# Patient Record
Sex: FEMALE | Race: WHITE | Employment: FULL TIME | URBAN - METROPOLITAN AREA
[De-identification: names, ages, dates, MRNs, and addresses within clinical notes are randomized per-mention and may not be internally consistent; named-entity substitution may affect disease eponyms.]

---

## 2017-05-08 DIAGNOSIS — F41.9 ANXIETY: ICD-10-CM

## 2017-05-08 RX ORDER — ALPRAZOLAM 0.25 MG/1
TABLET ORAL
Qty: 30 TAB | Refills: 0 | Status: SHIPPED | OUTPATIENT
Start: 2017-05-08 | End: 2017-05-10 | Stop reason: SDUPTHER

## 2017-05-10 DIAGNOSIS — F41.9 ANXIETY: ICD-10-CM

## 2017-05-11 RX ORDER — ALPRAZOLAM 0.25 MG/1
TABLET ORAL
Qty: 30 TAB | Refills: 0 | Status: SHIPPED | OUTPATIENT
Start: 2017-05-11 | End: 2017-06-13 | Stop reason: SDUPTHER

## 2017-05-16 ENCOUNTER — OFFICE VISIT (OUTPATIENT)
Dept: INTERNAL MEDICINE CLINIC | Facility: CLINIC | Age: 42
End: 2017-05-16

## 2017-05-16 VITALS
HEART RATE: 98 BPM | TEMPERATURE: 98.4 F | DIASTOLIC BLOOD PRESSURE: 79 MMHG | SYSTOLIC BLOOD PRESSURE: 130 MMHG | RESPIRATION RATE: 18 BRPM | BODY MASS INDEX: 34.15 KG/M2 | WEIGHT: 200 LBS | HEIGHT: 64 IN

## 2017-05-16 DIAGNOSIS — G89.29 CHRONIC PAIN OF RIGHT ANKLE: Primary | ICD-10-CM

## 2017-05-16 DIAGNOSIS — M25.571 CHRONIC PAIN OF RIGHT ANKLE: Primary | ICD-10-CM

## 2017-05-16 NOTE — PROGRESS NOTES
Subjective: Judy Álvarez is a 43 y.o. female who presents today for ankle edema. She typically sees [facility] [place]. Ankle Swelling  Patient complains of mildedema to right ankle. Swelling is not associated with pain but there has been an injury to the ankle in the past. Onset of symptoms was 5 days ago, gradually improving since that time. She has a history of right knee and ankle pain after a fall on the stairs. Two years ago she started working out more and then noted pain to right hip, knee, and ankle. She believes she never properly healed from her fall and has since had intermittent ankle pain/swelling. She is currently doing yoga, she has been able to balance on it but notes a grinding sensation, also noted with walking. She was doing a specific balance move last week that put a lot of pressure and weight on her ankle. Cardiac risk factors include obesity, hyperlipidemia. She is wearing an air brace at night, she has been propping it up at night, she is not using medications. 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    Multinodular goiter 05/21/2015    Elevated liver enzymes 05/21/2015    Hypercholesteremia 05/21/2015    Allergic rhinitis due to allergen 05/15/2015    Anxiety 05/15/2015    Factor V deficiency ([facility] [address] 05/15/2015    Urticaria 05/15/2015     Current Outpatient Prescriptions   Medication Sig Dispense Refill    ALPRAZolam (XANAX) 0.25 mg tablet TAKE ONE TABLET BY MOUTH TWICE DAILY AS NEEDED FOR ANXIETY (MAX DAILY AMOUNT: 2 TABS) 30 Tab 0    QNASL 80 mcg/actuation HFAA       fexofenadine-pseudoephedrine (ALLEGRA-D)  mg Tb12 Take 1 Tab by mouth every twelve (12) hours.  busPIRone (BUSPAR) 10 mg tablet TAKE ONE TABLET BY MOUTH THREE TIMES DAILY AS NEEDED 60 Tab 5    traZODone (DESYREL) 100 mg tablet Take 1 Tab by mouth nightly.  90 Tab 1    cholecalciferol, VITAMIN D3, (VITAMIN D3) 5,000 unit tab tablet Take  by mouth daily.  multivitamin (ONE A DAY) tablet Take 1 Tab by mouth daily. No Known Allergies  Family History   Problem Relation Age of Onset    Diabetes Mother     Thyroid Disease Mother      hypothyroidism    Hypertension Father     High Cholesterol Father     Thyroid Disease Father      goiter, hyperthyroidism    Heart Attack Father      in 29's    Diabetes Maternal Grandmother     Diabetes Maternal Grandfather     Diabetes Paternal Grandmother     Diabetes Paternal Grandfather      Social History   Substance Use Topics    Smoking status: Former Smoker     Packs/day: 1.50     Years: 24.00     Types: Cigarettes     Quit date: 8/8/2015    Smokeless tobacco: Not on file      Comment: Has tried hypnotherapy, ecig, Wellbutrin. Chantix worked!  Alcohol use 0.0 oz/week     0 Standard drinks or equivalent per week      Comment: 1-2 glass with dinner 3x/week, 2 glasses Friday/Saturday        Review of Systems    A comprehensive review of systems was negative except for that written in the HPI. Objective:     Visit Vitals    /79 (BP 1 Location: Left arm, BP Patient Position: Sitting)    Pulse 98    Temp 98.4 °F (36.9 °C) (Oral)    Resp 18    Ht 5' 4\" (1.626 m)    Wt 200 lb (90.7 kg)    LMP 05/02/2017 (Approximate)    BMI 34.33 kg/m2     General appearance: alert, cooperative, no distress, appears stated age  Head: Normocephalic, without obvious abnormality, atraumatic  Eyes: negative  Lungs: clear to auscultation bilaterally  Heart: regular rate and rhythm, S1, S2 normal, no murmur, click, rub or gallop  Extremities: extremities normal, atraumatic, no cyanosis. Right ankle with 1+ edema, no pitting. No pain with palpation of joint or to plantar surface. Crepitus noted with dorsiflexion. Pulses: 2+ and symmetric  Neurologic: Alert and oriented X 3, normal strength and tone.  Normal coordination and gait  Nursing note and vitals reviewed  Assessment/Plan:       ICD-10-CM ICD-9-CM    1. Chronic pain of right ankle M25.571 719.47 REFERRAL TO SPORTS MEDICINE    G89.29 338.29      Ankle wrapped with ace bandage. Advised that she use supportive shoes and take antiinflammatories as needed. Advised her to call back or return to office if symptoms worsen/change/persist.  Discussed expected course/resolution/complications of diagnosis in detail with patient. Medication risks/benefits/costs/interactions/alternatives discussed with patient. She was given an after visit summary which includes diagnoses, current medications, & vitals. She expressed understanding with the diagnosis and plan.

## 2017-05-16 NOTE — PROGRESS NOTES
Chief Complaint   Patient presents with    Ankle swelling     been having some r ankle swelling, does have hx of right leg and knee problems not sure it is related. Feels a grinding sensation but not painful. 1. Have you been to the ER, urgent care clinic since your last visit? Hospitalized since your last visit? No    2. Have you seen or consulted any other health care providers outside of the [address] [place] [address] since your last visit? Include any pap smears or colon screening.  No

## 2017-05-16 NOTE — MR AVS SNAPSHOT
Visit Information Date & Time Provider Department Dept. Phone Encounter #  
 5/16/2017  3:30 PM Beth Epps, Prakash [address] Internal Medicine [phone] [id number] Follow-up Instructions Return if symptoms worsen or fail to improve. Upcoming Health Maintenance Date Due INFLUENZA AGE 9 TO ADULT 8/1/2017 PAP AKA CERVICAL CYTOLOGY 6/15/2018 DTaP/Tdap/Td series (2 - Td) 5/23/2023 Allergies as of 5/16/2017  Review Complete On: 5/16/2017 By: Beth Epps NP No Known Allergies Current Immunizations  Reviewed on 11/18/2016 Name Date Influenza Vaccine (Quad) PF 11/18/2016, 12/2/2015 Tdap 5/23/2013 Not reviewed this visit You Were Diagnosed With   
  
 Codes Comments Chronic pain of right ankle    -  Primary ICD-10-CM: M25.571, G89.29 ICD-9-CM: 719.47, 338.29 Vitals BP Pulse Temp Resp Height(growth percentile) Weight(growth percentile) 130/79 (BP 1 Location: Left arm, BP Patient Position: Sitting) 98 98.4 °F (36.9 °C) (Oral) 18 5' 4\" (1.626 m) 200 lb (90.7 kg) LMP BMI OB Status Smoking Status 05/02/2017 (Approximate) 34.33 kg/m2 Having regular periods Former Smoker Vitals History BMI and BSA Data Body Mass Index Body Surface Area  
 34.33 kg/m 2 2.02 m 2 Preferred Pharmacy Pharmacy Name Phone [facility] [address] [place] [address] 78 Your Updated Medication List  
  
   
This list is accurate as of: 5/16/17  3:50 PM.  Always use your most recent med list.  
  
  
  
  
 ALPRAZolam 0.25 mg tablet Commonly known as:  XANAX  
TAKE ONE TABLET BY MOUTH TWICE DAILY AS NEEDED FOR ANXIETY (MAX DAILY AMOUNT: 2 TABS)  
  
 busPIRone 10 mg tablet Commonly known as:  BUSPAR  
TAKE ONE TABLET BY MOUTH THREE TIMES DAILY AS NEEDED  
  
 cholecalciferol (VITAMIN D3) 5,000 unit Tab tablet Commonly known as:  VITAMIN D3 Take  by mouth daily. fexofenadine-pseudoephedrine  mg Tb12 Commonly known as:  ALLEGRA-D Take 1 Tab by mouth every twelve (12) hours. multivitamin tablet Commonly known as:  ONE A DAY Take 1 Tab by mouth daily. QNASL 80 mcg/actuation Hfaa Generic drug:  beclomethasone dipropionate  
  
 traZODone 100 mg tablet Commonly known as:  Shelva Chaffee Take 1 Tab by mouth nightly. We Performed the Following REFERRAL TO SPORTS MEDICINE [GWH380 Custom] Follow-up Instructions Return if symptoms worsen or fail to improve. Referral Information Referral ID Referred By Referred To  
  
 [id number] SKIFF, Kennth Rounds, MD   
   [address] [address], [facility] Phone: [phone] Fax: [phone] Visits Status Start Date End Date 1 New Request 5/16/17 5/16/18 If your referral has a status of pending review or denied, additional information will be sent to support the outcome of this decision. Patient Instructions Ankle Sprain: Rehab Exercises Your Care Instructions Here are some examples of typical rehabilitation exercises for your condition. Start each exercise slowly. Ease off the exercise if you start to have pain. Your doctor or physical therapist will tell you when you can start these exercises and which ones will work best for you. How to do the exercises \"Alphabet\" exercise 1. Trace the alphabet with your toe. This helps your ankle move in all directions. Side-to-side knee swing exercise 1. Sit in a chair with your foot flat on the floor. 2. Slowly move your knee from side to side. Keep your foot pressed flat. 3. Continue this exercise for 2 to 3 minutes. Towel curl 1. While sitting, place your foot on a towel on the floor. Scrunch the towel toward you with your toes. 2. Then use your toes to push the towel away from you.  
3. To make this exercise more challenging you can put something on the other end of the towel. A can of soup is about the right weight for this. Towel stretch 1. Sit with your legs extended and knees straight. 2. Place a towel around your foot just under the toes. 3. Hold each end of the towel in each hand, with your hands above your knees. 4. Pull back with the towel so that your foot stretches toward you. 5. Hold the position for at least 15 to 30 seconds. 6. Repeat 2 to 4 times a session. Do up to 5 sessions a day. Ankle eversion exercise 1. Start by sitting with your foot flat on the floor. Push your foot outward against a wall or a piece of furniture that doesn't move. Hold for about 6 seconds, and relax. Repeat 8 to 12 times. 2. After you feel comfortable with this, try using rubber tubing looped around the outside of your feet for resistance. Push your foot out to the side against the tubing, and then count to 10 as you slowly bring your foot back to the middle. Repeat 8 to 12 times. Isometric opposition exercises 1. While sitting, put your feet together flat on the floor. 2. Press your injured foot inward against your other foot. Hold for about 6 seconds, and relax. Repeat 8 to 12 times. 3. Then place the heel of your other foot on top of the injured one. Push down with the top heel while trying to push up with your injured foot. Hold for about 6 seconds, and relax. Repeat 8 to 12 times. Resisted ankle inversion 1. Sit on the floor with your good leg crossed over your other leg. 2. Hold both ends of an exercise band and loop the band around the inside of your affected foot. Then press your other foot against the band. 3. Keeping your legs crossed, slowly push your affected foot against the band so that foot moves away from your other foot. Then slowly relax. 4. Repeat 8 to 12 times. Resisted ankle eversion 1. Sit on the floor with your legs straight.  
2. Hold both ends of an exercise band and loop the band around the outside of your affected foot. Then press your other foot against the band. 3. Keeping your leg straight, slowly push your affected foot outward against the band and away from your other foot without letting your leg rotate. Then slowly relax. 4. Repeat 8 to 12 times. Resisted ankle dorsiflexion 1. Tie the ends of an exercise band together to form a loop. Attach one end of the loop to a secure object or shut a door on it to hold it in place. (Or you can have someone hold one end of the loop to provide resistance.) 2. While sitting on the floor or in a chair, loop the other end of the band over the top of your affected foot. 3. Keeping your knee and leg straight, slowly flex your foot to pull back on the exercise band, and then slowly relax. 4. Repeat 8 to 12 times. Single-leg balance 1. Stand on a flat surface with your arms stretched out to your sides like you are making the letter \"T. \" Then lift your good leg off the floor, bending it at the knee. If you are not steady on your feet, use one hand to hold on to a chair, counter, or wall. 2. Standing on the leg with your affected ankle, keep that knee straight. Try to balance on that leg for up to 30 seconds. Then rest for up to 10 seconds. 3. Repeat 6 to 8 times. 4. When you can balance on your affected leg for 30 seconds with your eyes open, try to balance on it with your eyes closed. When you can do this exercise with your eyes closed for 30 seconds and with ease and no pain, try standing on a pillow or piece of foam, and repeat steps 1 through 4. Follow-up care is a key part of your treatment and safety. Be sure to make and go to all appointments, and call your doctor if you are having problems. It's also a good idea to know your test results and keep a list of the medicines you take. Where can you learn more? Go to http://awilda-nicho.info/. Kathy Leos in the search box to learn more about \"Ankle Sprain: Rehab Exercises. \" 
 Current as of: May 23, 2016 Content Version: 11.2 © 0402-9247 [employer]. Care instructions adapted under license by [employer] (which disclaims liability or warranty for this information). If you have questions about a medical condition or this instruction, always ask your healthcare professional. [address] any warranty or liability for your use of this information. Introducing [facility] & HEALTH SERVICES! Dear Dona Hairston: 
Thank you for requesting a [employer] account. Our records indicate that you already have an active [employer] account. You can access your account anytime at [email or web address] Did you know that you can access your hospital and ER discharge instructions at any time in [employer]? You can also review all of your test results from your hospital stay or ER visit. Additional Information If you have questions, please visit the Frequently Asked Questions section of the [employer] website at [email or web address] Remember, [employer] is NOT to be used for urgent needs. For medical emergencies, dial 911. Now available from your iPhone and Android! Please provide this summary of care documentation to your next provider. Your primary care clinician is listed as Anastasia Aldridge. If you have any questions after today's visit, please call [phone].

## 2017-05-16 NOTE — PATIENT INSTRUCTIONS
Ankle Sprain: Rehab Exercises  Your Care Instructions  Here are some examples of typical rehabilitation exercises for your condition. Start each exercise slowly. Ease off the exercise if you start to have pain. Your doctor or physical therapist will tell you when you can start these exercises and which ones will work best for you. How to do the exercises  \"Alphabet\" exercise    1. Trace the alphabet with your toe. This helps your ankle move in all directions. Side-to-side knee swing exercise    1. Sit in a chair with your foot flat on the floor. 2. Slowly move your knee from side to side. Keep your foot pressed flat. 3. Continue this exercise for 2 to 3 minutes. Towel curl    1. While sitting, place your foot on a towel on the floor. Scrunch the towel toward you with your toes. 2. Then use your toes to push the towel away from you. 3. To make this exercise more challenging you can put something on the other end of the towel. A can of soup is about the right weight for this. Towel stretch    1. Sit with your legs extended and knees straight. 2. Place a towel around your foot just under the toes. 3. Hold each end of the towel in each hand, with your hands above your knees. 4. Pull back with the towel so that your foot stretches toward you. 5. Hold the position for at least 15 to 30 seconds. 6. Repeat 2 to 4 times a session. Do up to 5 sessions a day. Ankle eversion exercise    1. Start by sitting with your foot flat on the floor. Push your foot outward against a wall or a piece of furniture that doesn't move. Hold for about 6 seconds, and relax. Repeat 8 to 12 times. 2. After you feel comfortable with this, try using rubber tubing looped around the outside of your feet for resistance. Push your foot out to the side against the tubing, and then count to 10 as you slowly bring your foot back to the middle. Repeat 8 to 12 times. Isometric opposition exercises    1.  While sitting, put your feet together flat on the floor. 2. Press your injured foot inward against your other foot. Hold for about 6 seconds, and relax. Repeat 8 to 12 times. 3. Then place the heel of your other foot on top of the injured one. Push down with the top heel while trying to push up with your injured foot. Hold for about 6 seconds, and relax. Repeat 8 to 12 times. Resisted ankle inversion    1. Sit on the floor with your good leg crossed over your other leg. 2. Hold both ends of an exercise band and loop the band around the inside of your affected foot. Then press your other foot against the band. 3. Keeping your legs crossed, slowly push your affected foot against the band so that foot moves away from your other foot. Then slowly relax. 4. Repeat 8 to 12 times. Resisted ankle eversion    1. Sit on the floor with your legs straight. 2. Hold both ends of an exercise band and loop the band around the outside of your affected foot. Then press your other foot against the band. 3. Keeping your leg straight, slowly push your affected foot outward against the band and away from your other foot without letting your leg rotate. Then slowly relax. 4. Repeat 8 to 12 times. Resisted ankle dorsiflexion    1. Tie the ends of an exercise band together to form a loop. Attach one end of the loop to a secure object or shut a door on it to hold it in place. (Or you can have someone hold one end of the loop to provide resistance.)  2. While sitting on the floor or in a chair, loop the other end of the band over the top of your affected foot. 3. Keeping your knee and leg straight, slowly flex your foot to pull back on the exercise band, and then slowly relax. 4. Repeat 8 to 12 times. Single-leg balance    1. Stand on a flat surface with your arms stretched out to your sides like you are making the letter \"T. \" Then lift your good leg off the floor, bending it at the knee.  If you are not steady on your feet, use one hand to hold on to a chair, counter, or wall. 2. Standing on the leg with your affected ankle, keep that knee straight. Try to balance on that leg for up to 30 seconds. Then rest for up to 10 seconds. 3. Repeat 6 to 8 times. 4. When you can balance on your affected leg for 30 seconds with your eyes open, try to balance on it with your eyes closed. When you can do this exercise with your eyes closed for 30 seconds and with ease and no pain, try standing on a pillow or piece of foam, and repeat steps 1 through 4. Follow-up care is a key part of your treatment and safety. Be sure to make and go to all appointments, and call your doctor if you are having problems. It's also a good idea to know your test results and keep a list of the medicines you take. Where can you learn more? Go to http://awilda-nicho.info/. Bettina Jolly in the search box to learn more about \"Ankle Sprain: Rehab Exercises. \"  Current as of: May 23, 2016  Content Version: 11.2  © 9045-2318 [employer]. Care instructions adapted under license by [employer] (which disclaims liability or warranty for this information). If you have questions about a medical condition or this instruction, always ask your healthcare professional. [address] any warranty or liability for your use of this information.

## 2017-06-07 ENCOUNTER — OFFICE VISIT (OUTPATIENT)
Dept: INTERNAL MEDICINE CLINIC | Age: 42
End: 2017-06-07

## 2017-06-07 VITALS
SYSTOLIC BLOOD PRESSURE: 131 MMHG | WEIGHT: 198 LBS | TEMPERATURE: 98.2 F | DIASTOLIC BLOOD PRESSURE: 82 MMHG | RESPIRATION RATE: 14 BRPM | HEART RATE: 89 BPM | HEIGHT: 64 IN | BODY MASS INDEX: 33.8 KG/M2

## 2017-06-07 DIAGNOSIS — M25.571 CHRONIC PAIN OF RIGHT ANKLE: Primary | ICD-10-CM

## 2017-06-07 DIAGNOSIS — M21.41 PES PLANUS OF BOTH FEET: ICD-10-CM

## 2017-06-07 DIAGNOSIS — G89.29 CHRONIC PAIN OF RIGHT ANKLE: Primary | ICD-10-CM

## 2017-06-07 DIAGNOSIS — M21.42 PES PLANUS OF BOTH FEET: ICD-10-CM

## 2017-06-07 NOTE — MR AVS SNAPSHOT
Visit Information Date & Time Provider Department Dept. Phone Encounter #  
 6/7/2017 10:45 AM Mena Cedillo MD [facility] [place] [id number] Follow-up Instructions Return in about 5 weeks (around 7/12/2017). Upcoming Health Maintenance Date Due INFLUENZA AGE 9 TO ADULT 8/1/2017 PAP AKA CERVICAL CYTOLOGY 6/15/2018 DTaP/Tdap/Td series (2 - Td) 5/23/2023 Allergies as of 6/7/2017  Review Complete On: 6/7/2017 By: Jakob Dixon LPN No Known Allergies Current Immunizations  Reviewed on 11/18/2016 Name Date Influenza Vaccine (Quad) PF 11/18/2016, 12/2/2015 Tdap 5/23/2013 Not reviewed this visit You Were Diagnosed With   
  
 Codes Comments Chronic pain of right ankle    -  Primary ICD-10-CM: M25.571, G89.29 ICD-9-CM: 719.47, 338.29 Pes planus of both feet     ICD-10-CM: M21.41, M21.42 
ICD-9-CM: 922 Vitals BP Pulse Temp Resp Height(growth percentile) Weight(growth percentile) 131/82 89 98.2 °F (36.8 °C) 14 5' 4\" (1.626 m) 198 lb (89.8 kg) LMP BMI OB Status Smoking Status 05/02/2017 (Approximate) 33.99 kg/m2 Having regular periods Former Smoker Vitals History BMI and BSA Data Body Mass Index Body Surface Area  
 33.99 kg/m 2 2.01 m 2 Preferred Pharmacy Pharmacy Name Phone [facility] [address] [place] [address] Your Updated Medication List  
  
   
This list is accurate as of: 6/7/17 11:15 AM.  Always use your most recent med list.  
  
  
  
  
 ALPRAZolam 0.25 mg tablet Commonly known as:  XANAX  
TAKE ONE TABLET BY MOUTH TWICE DAILY AS NEEDED FOR ANXIETY (MAX DAILY AMOUNT: 2 TABS)  
  
 busPIRone 10 mg tablet Commonly known as:  BUSPAR  
TAKE ONE TABLET BY MOUTH THREE TIMES DAILY AS NEEDED  
  
 cholecalciferol (VITAMIN D3) 5,000 unit Tab tablet Commonly known as:  VITAMIN D3 Take  by mouth daily. fexofenadine-pseudoephedrine  mg Tb12 Commonly known as:  ALLEGRA-D Take 1 Tab by mouth every twelve (12) hours. multivitamin tablet Commonly known as:  ONE A DAY Take 1 Tab by mouth daily. QNASL 80 mcg/actuation Hfaa Generic drug:  beclomethasone dipropionate  
  
 traZODone 100 mg tablet Commonly known as:  Arneta Messenger Take 1 Tab by mouth nightly. We Performed the Following REFERRAL TO PHYSICAL THERAPY [RLB14 Custom] Follow-up Instructions Return in about 5 weeks (around 7/12/2017). To-Do List   
 06/07/2017 Imaging:  XR ANKLE RT MIN 3 V Referral Information Referral ID Referred By Referred To  
  
 [id number] [facility]   
   [address], [address] Phone: [phone] Fax: [phone] Visits Status Start Date End Date  
 20 New Request 6/7/17 6/7/18 If your referral has a status of pending review or denied, additional information will be sent to support the outcome of this decision. Introducing [facility] & HEALTH SERVICES! Dear Nick Roberts: 
Thank you for requesting a [facility] account. Our records indicate that you already have an active [facility] account. You can access your account anytime at [email or web address] Did you know that you can access your hospital and ER discharge instructions at any time in [facility]? You can also review all of your test results from your hospital stay or ER visit. Additional Information If you have questions, please visit the Frequently Asked Questions section of the [facility] website at [email or web address] Remember, [facility] is NOT to be used for urgent needs. For medical emergencies, dial 911. Now available from your iPhone and Android! Please provide this summary of care documentation to your next provider. Your primary care clinician is listed as Charmayne Sizer.  If you have any questions after today's visit, please call [phone].

## 2017-06-07 NOTE — PROGRESS NOTES
Chief Complaint   Patient presents with    Ankle Pain     right, follow-up     she is a 43y.o. year old female who presents for evaluation of right ankle  Pain Assessment Encounter      Octavia Farr  6/7/2017  Onset of Symptoms: over a year  ________________________________________________________________________  Description:aching     Frequency: 4-5 times a day  Pain Scale:(1-10): 5  Trauma Hx: yes fell down stairs 12-13 years ago  Hx of similar symptoms: No:   Radiation: ankle  Duration:  intermittent      Progression: has worsened slightly  What makes it better?: OTC meds and rest  What makes it worse?:exercise and walking  Medications tried: ibuprofen    Reviewed and agree with Nurse Note and duplicated in this note. Reviewed PmHx, RxHx, FmHx, SocHx, AllgHx and updated and dated in the chart. Family History   Problem Relation Age of Onset    Diabetes Mother     Thyroid Disease Mother      hypothyroidism    Hypertension Father     High Cholesterol Father     Thyroid Disease Father      goiter, hyperthyroidism    Heart Attack Father      in 29's    Diabetes Maternal Grandmother     Diabetes Maternal Grandfather     Diabetes Paternal Grandmother     Diabetes Paternal Grandfather        Past Medical History:   Diagnosis Date    Anxiety     Depression     Nontoxic multinodular goiter     Followed with [facility] months starting 7/2015 0 Dr. Radha Pappas        Social History     Social History    Marital status: SINGLE     Spouse name: N/A    Number of children: 0    Years of education: N/A     Occupational History     - coaching other teachers as well      Social History Main Topics    Smoking status: Former Smoker     Packs/day: 1.50     Years: 24.00     Types: Cigarettes     Quit date: 8/8/2015    Smokeless tobacco: Not on file      Comment: Has tried hypnotherapy, ecig, Wellbutrin. Chantix worked!     Alcohol use 0.0 oz/week     0 Standard drinks or equivalent per week      Comment: 1-2 glass with dinner 3x/week, 2 glasses Friday/Saturday    Drug use: No    Sexual activity: Yes     Partners: Male     Birth control/ protection: Rhythm     Other Topics Concern    Exercise Yes     2x/week yoga & strength    Seat Belt Yes    Self-Exams No     Social History Narrative    Lives with one dog. Boyfriend - dating off and on since 2012 - steady since Sept 2015. Losing weight with ZG in [id number]! Review of Systems - negative except as listed above      Objective:     Vitals:    06/07/17 1047   Resp: 14   Temp: 98.2 °F (36.8 °C)   Weight: 198 lb (89.8 kg)   Height: 5' 4\" (1.626 m)       Physical Examination: General appearance - alert, well appearing, and in no distress  Back exam - full range of motion, no tenderness, palpable spasm or pain on motion  Neurological - A right ankle exam was performed. Swelling: none  Tenderness: none    Palpation:  ATFL:  non-tender  CFL:  non-tender  PTFL:  non-tender  Syndesmosis Ligament:  non-tender  Deltoid ligament:  non-tender  Posterior Tibialis Tendon:  non-tender  Peroneal Tendon: non-tender  Achilles Tendon:  non-tender     Proximal Fibula:  non-tender  Proximal Tibia:  non-tender  Distal Fibula:  non-tender  Distal Tibia:  non-tender    Plantar Fascia: non-tender  Midfoot:  non-tender  Forefoot:  non-tender    ROM:  Plantar Flexion:  normal  Dorsiflexion:  normal    Squeeze Test: negative  Talar Tilt Test:  negative  Anterior Drawer:negative    Kleiger Test:  negative  Hop Test: negative  [place]: negative      Musculoskeletal - no joint tenderness, deformity or swelling  Extremities - peripheral pulses normal, no pedal edema, no clubbing or cyanosis  Skin - normal coloration and turgor, no rashes, no suspicious skin lesions noted    Assessment/ Plan:   Kodak Olivo was seen today for ankle pain.     Diagnoses and all orders for this visit:    Chronic pain of right ankle  -     XR ANKLE RT MIN 3 V; Future  - REFERRAL TO PHYSICAL THERAPY    Pes planus of both feet  -     REFERRAL TO PHYSICAL THERAPY       Pathophysiology, recovery and rehabilitation process discussed and questions answered   Counseling for 30 Minutes of the total visit duration   Pictures and figures used as necessary   Provided reassurance   Handouts provided and reviewed with patient for chronic ankle instability           I have discussed the diagnosis with the patient and the intended plan as seen in the above orders. The patient has received an after-visit summary and questions were answered concerning future plans. Medication Side Effects and Warnings were discussed with patient: yes  Patient Labs were reviewed and or requested: yes  Patient Past Records were reviewed and or requested  yes  I have discussed the diagnosis with the patient and the intended plan as seen in the above orders. The patient has received an after-visit summary and questions were answered concerning future plans. Pt agrees to call or return to clinic and/or go to closest ER with any worsening of symptoms. This may include, but not limited to increased fever (>100.4) with NSAIDS or Tylenol, increased edema, confusion, rash, worsening of presenting symptoms. 1) Remember to stay active and/or exercise regularly (I suggest 30-45 minutes daily)   2) For reliable dietary information, go to www. EATRIGHT.org. You may wish to consider seeing the nutritionist at [facility] at #[phone] or [phone], also consider the [address]   3) I routinely suggest a complete physical exam once each year (your birth month)

## 2017-06-15 ENCOUNTER — APPOINTMENT (OUTPATIENT)
Dept: PHYSICAL THERAPY | Age: 42
End: 2017-06-15
Payer: COMMERCIAL

## 2017-06-16 ENCOUNTER — HOSPITAL ENCOUNTER (OUTPATIENT)
Dept: PHYSICAL THERAPY | Age: 42
Discharge: HOME | End: 2017-06-16
Payer: COMMERCIAL

## 2017-06-16 PROCEDURE — 97161 PT EVAL LOW COMPLEX 20 MIN: CPT | Performed by: PHYSICAL THERAPIST

## 2017-06-16 PROCEDURE — 97110 THERAPEUTIC EXERCISES: CPT | Performed by: PHYSICAL THERAPIST

## 2017-06-16 NOTE — PROGRESS NOTES
[facility]  [address]  Phone: [phone]  Fax: [phone]    Plan of Care/Statement of Necessity for Physical Therapy Services  2-15    Patient name: Margarita Marie  : 1975  Provider#: [id number]  Referral source: Froylan Olmstead MD      Medical/Treatment Diagnosis: Pain in right ankle and joints of right foot [M25.571]     Prior Hospitalization: see medical history     Comorbidities: none  Prior Level of Function: complete 20 minutes of exercise at least 1-2 times a week  Medications: Verified on Patient Summary List    Start of Care: 2017      Onset Date: 1 year ago       The Plan of Care and following information is based on the information from the initial evaluation. Assessment/ dick information: 43 y.o. Female with recurrent right ankle and knee pain secondary to ankle instability and hip/core/pelvis muscle performance deficit.     Evaluation Complexity History LOW Complexity : Zero comorbidities / personal factors that will impact the outcome / POC; Examination HIGH Complexity : 4+ Standardized tests and measures addressing body structure, function, activity limitation and / or participation in recreation  ;Presentation LOW Complexity : Stable, uncomplicated  ;Clinical Decision Making MEDIUM Complexity : FOTO score of 26-74  Overall Complexity Rating: LOW     Problem List: pain affecting function, decrease strength, impaired gait/ balance, decrease ADL/ functional abilitiies and decrease activity tolerance   Treatment Plan may include any combination of the following: Therapeutic exercise, Therapeutic activities, Neuromuscular re-education, Physical agent/modality, Gait/balance training, Manual therapy, Patient education and Self Care training  Patient / Family readiness to learn indicated by: asking questions and trying to perform skills  Persons(s) to be included in education: patient (P)  Barriers to Learning/Limitations: None  Patient Goal (s): enable to exercise without irritating it  Patient Self Reported Health Status: good  Rehabilitation Potential: good    Long Term Goals: To be accomplished in 2-4 weeks:  1) Pt will be able to squat without pain. 2) Pt will be able to navigate uneven terrain without ankle pain or instability. 3) Pt will be able to resume gym work out routine without pain. Frequency / Duration: Patient to be seen 1 times per week for 2-4 weeks. Patient/ Caregiver education and instruction: activity modification and exercises    [x]  Plan of care has been reviewed with YRIS Braga PT, DPT 6/16/2017 11:59 AM    ________________________________________________________________________    I certify that the above Therapy Services are being furnished while the patient is under my care. I agree with the treatment plan and certify that this therapy is necessary.     [de-identified] Signature:____________________  Date:____________Time: _________

## 2017-06-16 NOTE — PROGRESS NOTES
PT INITIAL EVALUATION NOTE 2-15    Patient Name: Emma Caceres  ZPBT:  : 1975  [x]  Patient  Verified  Payor: Clotilda Meckel / Plan: Pipo Vargas / Product Type: HMO /    In time:  Out time:  Total Treatment Time (min): 60  Visit #: 1     Treatment Area: Pain in right ankle and joints of right foot [M25.571]    SUBJECTIVE  Pain Level (0-10 scale): 0/10  Any medication changes, allergies to medications, adverse drug reactions, diagnosis change, or new procedure performed?: [] No    [x] Yes (see summary sheet for update)  Subjective:     Pt reports onset of right ankle pain and then knee pain with working out about 1 year ago. She did have some reduction in pain with starting a new job as she sat more and worked out less. She has noticed an increase of right ankle swelling as she increased gym activity. Pain reduces with rest.  Seems to be worse with flats and heels. Wants to return to strength training, cardio on elliptical and yoga at the gym.          OBJECTIVE/EXAMINATION  Posture:  Pes planus foot posture right > left  Other Observations:  --  Gait and Functional Mobility:  Hip ADD+IR with single limb squat, excessive rearfoot/midfoot pronation at midstance and delayed supination into push off  Palpation: tenderness at fibularis longus tendon around lateral maleolus    Bilateral Knee AROM normal PROM normal  Bilateral Ankle AROM 15/0/50 PROM 22/0/52     Ankle Dorsiflexion with knee flexed 22    Joint Mobility Assessment: normal talocrural mobility    Flexibility: LE flexibility WNL                  LOWER QUARTER   MUSCLE STRENGTH  KEY       R  L  0 - No Contraction  Knee ext  5  5  1 - Trace            flex  5  5  2 - Poor   Hip ext   5  5  3 - Fair          flex   5  5  4 - Good         abd  5  5  5 - Normal         add  5  5      Ankle DF  5  5                PF  5  5                INV  5  5                EV  5  5      Neurological: Reflexes / Sensations: normal  Special Tests: Ashley Ольга: negative                      10 min Therapeutic Exercise:  [x] See flow sheet : demonstration and preparation of HEP   Rationale: increase ROM, increase strength, improve coordination, improve balance and increase proprioception to improve the patients ability to squat and ambulate            With   [x] TE   [] TA   [] neuro   [] other: Patient Education: [x] Review HEP    [] Progressed/Changed HEP based on:   [] positioning   [] body mechanics   [] transfers   [] heat/ice application    [] other:        Other Objective/Functional Measures: FOTO Functional Measure:  53/100    Pain Level (0-10 scale) post treatment: 0/10      ASSESSMENT:      [x]  See Plan of Care      Joe Braga PT, DPT 6/16/2017  9:30 AM

## 2017-06-21 ENCOUNTER — HOSPITAL ENCOUNTER (OUTPATIENT)
Dept: PHYSICAL THERAPY | Age: 42
Discharge: HOME | End: 2017-06-21
Payer: COMMERCIAL

## 2017-06-21 PROCEDURE — 97110 THERAPEUTIC EXERCISES: CPT

## 2017-06-21 PROCEDURE — 97112 NEUROMUSCULAR REEDUCATION: CPT

## 2017-06-21 NOTE — PROGRESS NOTES
PT DAILY TREATMENT NOTE - [facility] 2-15    Patient Name: Jer Martinez  Date:2017  : 1975  [x]  Patient  Verified  Payor: Alejandra Smith / Plan: Yari Hill / Product Type: HMO /    In time:11:40A  Out time:12:30P  Total Treatment Time (min): 50  Total Timed Codes (min): 50  1:1 Treatment Time ( [address] only): --   Visit #: 2     Treatment Area: Pain in right ankle and joints of right foot [M25.571]    SUBJECTIVE  Pain Level (0-10 scale): 0/10  Any medication changes, allergies to medications, adverse drug reactions, diagnosis change, or new procedure performed?: [x] No    [] Yes (see summary sheet for update)  Subjective functional status/changes:   [] No changes reported  \"I did the balance work that [email or web address] gave me and I was sore yesterday from that. I wore my flip flops all day on Monday and by the end of the day in my ankle and the top of my foot. The was better by yesterday. \"    OBJECTIVE    40 min Therapeutic Exercise:  [x] See flow sheet :   Rationale: increase ROM, increase strength and improve coordination to improve the patients ability to increase ankle strength and stability. 10 min Neuromuscular Re-education:  [x]  See flow sheet :   Rationale: improve coordination, improve balance and increase proprioception  to improve the patients ability to increase core stability and stabilization. With   [] TE   [] TA   [] neuro   [] other: Patient Education: [x] Review HEP    [] Progressed/Changed HEP based on:   [] positioning   [] body mechanics   [] transfers   [] heat/ice application    [] other:      Other Objective/Functional Measures: --     Pain Level (0-10 scale) post treatment: 0/10    ASSESSMENT/Changes in Function:   Pt's program limited secondary to time constrain today. Pt tolerated increase in strengthening without increase in pain or discomfort. Continue to progress as tolerated.   Patient will continue to benefit from skilled PT services to modify and progress therapeutic interventions, address functional mobility deficits, address ROM deficits, address strength deficits, analyze and address soft tissue restrictions and analyze and cue movement patterns to attain remaining goals. [x]  See Plan of Care  []  See progress note/recertification  []  See Discharge Summary         Progress towards goals / Updated goals:  Long Term Goals: To be accomplished in 2-4 weeks:  1) Pt will be able to squat without pain. 2) Pt will be able to navigate uneven terrain without ankle pain or instability.   3) Pt will be able to resume gym work out routine without pain    PLAN  [x]  Upgrade activities as tolerated     [x]  Continue plan of care  []  Update interventions per flow sheet       []  Discharge due to:_  []  Other:_      Negro Santos PTA 6/21/2017  11:37 AM

## 2017-06-28 ENCOUNTER — HOSPITAL ENCOUNTER (OUTPATIENT)
Dept: PHYSICAL THERAPY | Age: 42
Discharge: HOME | End: 2017-06-28
Payer: COMMERCIAL

## 2017-06-28 PROCEDURE — 97112 NEUROMUSCULAR REEDUCATION: CPT

## 2017-06-28 PROCEDURE — 97110 THERAPEUTIC EXERCISES: CPT

## 2017-06-28 NOTE — PROGRESS NOTES
PT DAILY TREATMENT NOTE - [facility] 2-15    Patient Name: Arturo Batch  Date:2017  : 1975  [x]  Patient  Verified  Payor: Ashwin Pino / Plan:  Cap / Product Type: HMO /    In time:11:35A  Out time:12:15P  Total Treatment Time (min): 40  Total Timed Codes (min): 40  1:1 Treatment Time ( only): --   Visit #: 3     Treatment Area: Pain in right ankle and joints of right foot [M25.571]    SUBJECTIVE  Pain Level (0-10 scale): 0/10  Any medication changes, allergies to medications, adverse drug reactions, diagnosis change, or new procedure performed?: [x] No    [] Yes (see summary sheet for update)  Subjective functional status/changes:   [] No changes reported  \"I have had a little pain in my arch last night and this morning. I cannot think of anything that would have contributed to it.  I tried to do my SLS in the sand. I had no pain with that at all. I was out of town over the weekend I was not very faithful about doing my HEP. I did some squatting the yesterday, I did about 2 sets of 15. That usually aggravates my R hip all the way down to my knee. It did not bother me but I will be curious to see what happens as I continue to do them. \"    OBJECTIVE    30 min Therapeutic Exercise:  [x] See flow sheet :   Rationale: increase ROM, increase strength and improve coordination to improve the patients ability to increase ankle strength and stability. 10 min Neuromuscular Re-education:  [x]  See flow sheet :   Rationale: improve coordination, improve balance and increase proprioception  to improve the patients ability to increase core stability and stabilization.           With   [] TE   [] TA   [] neuro   [] other: Patient Education: [x] Review HEP    [] Progressed/Changed HEP based on:   [] positioning   [] body mechanics   [] transfers   [] heat/ice application    [] other:      Other Objective/Functional Measures: --     Pain Level (0-10 scale) post treatment: 0/10    ASSESSMENT/Changes in Function:   Pt tolerated increase in strengthening without increase in pain or discomfort in R ankle or knee. Pt program limited secondary to time constrain. Patient will continue to benefit from skilled PT services to modify and progress therapeutic interventions, address functional mobility deficits, address ROM deficits, address strength deficits, analyze and address soft tissue restrictions and analyze and cue movement patterns to attain remaining goals. [x]  See Plan of Care  []  See progress note/recertification  []  See Discharge Summary         Progress towards goals / Updated goals:  Long Term Goals: To be accomplished in 2-4 weeks:  1) Pt will be able to squat without pain. 2) Pt will be able to navigate uneven terrain without ankle pain or instability.   3) Pt will be able to resume gym work out routine without pain    PLAN  [x]  Upgrade activities as tolerated     [x]  Continue plan of care  []  Update interventions per flow sheet       []  Discharge due to:_  []  Other:_      Radha Thakkar, YRIS 6/28/2017  11:37 AM

## 2017-07-10 DIAGNOSIS — G47.00 INSOMNIA, UNSPECIFIED TYPE: ICD-10-CM

## 2017-07-10 DIAGNOSIS — F41.9 ANXIETY: ICD-10-CM

## 2017-07-11 ENCOUNTER — APPOINTMENT (OUTPATIENT)
Dept: PHYSICAL THERAPY | Age: 42
End: 2017-07-11
Payer: COMMERCIAL

## 2017-07-11 RX ORDER — TRAZODONE HYDROCHLORIDE 100 MG/1
TABLET ORAL
Qty: 90 TAB | Refills: 0 | Status: SHIPPED | OUTPATIENT
Start: 2017-07-11 | End: 2017-12-12 | Stop reason: ALTCHOICE

## 2017-07-12 ENCOUNTER — OFFICE VISIT (OUTPATIENT)
Dept: INTERNAL MEDICINE CLINIC | Age: 42
End: 2017-07-12

## 2017-07-12 ENCOUNTER — HOSPITAL ENCOUNTER (OUTPATIENT)
Dept: PHYSICAL THERAPY | Age: 42
Discharge: HOME | End: 2017-07-12
Payer: COMMERCIAL

## 2017-07-12 DIAGNOSIS — M22.2X1 PATELLOFEMORAL DISORDER OF RIGHT KNEE: Primary | ICD-10-CM

## 2017-07-12 DIAGNOSIS — M25.571 CHRONIC PAIN OF RIGHT ANKLE: ICD-10-CM

## 2017-07-12 DIAGNOSIS — G89.29 CHRONIC PAIN OF RIGHT ANKLE: ICD-10-CM

## 2017-07-12 PROCEDURE — 97110 THERAPEUTIC EXERCISES: CPT

## 2017-07-12 NOTE — PROGRESS NOTES
PT DAILY TREATMENT NOTE - [facility] 2-15    Patient Name: Arnold Armstrong  Date:2017  : 1975  [x]  Patient  Verified  Payor: Renu Prater / Plan: Noah Mckee / Product Type: HMO /    In time:11:05A  Out time:11:40A  Total Treatment Time (min): 35  Total Timed Codes (min): 35  1:1 Treatment Time ([facility] only): --   Visit #: 4     Treatment Area: Right ankle pain [M25.571]    SUBJECTIVE  Pain Level (0-10 scale): 0/10  Any medication changes, allergies to medications, adverse drug reactions, diagnosis change, or new procedure performed?: [x] No    [] Yes (see summary sheet for update)  Subjective functional status/changes:   [] No changes reported  \"I have not been as consistent with my HEP and gym routine like I should be. I notice an overall improvement in my R ankle but my R knee still aches. Aggravating factors include climbing stairs and squatting. \"    OBJECTIVE     35 min Therapeutic Exercise:  [x] See flow sheet :   Rationale: increase ROM, increase strength and improve coordination to improve the patients ability to increase tolerance to daily and gym related activities           With   [] TE   [] TA   [] neuro   [] other: Patient Education: [x] Review HEP    [] Progressed/Changed HEP based on:   [] positioning   [] body mechanics   [] transfers   [] heat/ice application    [] other:      Other Objective/Functional Measures: --     Pain Level (0-10 scale) post treatment: 0/10    ASSESSMENT/Changes in Function:   Added calf stretch and Dips today. Pt reported feeling \"shaky\" with dips and c/o of slight discomfort in anterior portion on knee. Pt's program limited secondary to time constrain. Pt stated she is trying to adjust her appointment times to allow more time for therapy sessions. Pt will FU with PT Reba Wyatt next week for re-assessment.   Patient will continue to benefit from skilled PT services to modify and progress therapeutic interventions, address functional mobility deficits, address ROM deficits, address strength deficits, analyze and address soft tissue restrictions, analyze and cue movement patterns and analyze and modify body mechanics/ergonomics to attain remaining goals. [x]  See Plan of Care  []  See progress note/recertification  []  See Discharge Summary         Progress towards goals / Updated goals:  Long Term Goals: To be accomplished in 2-4 weeks:  1) Pt will be able to squat without pain. Partially MET (pt still reports ache in R knee)  2) Pt will be able to navigate uneven terrain without ankle pain or instability. MET  3) Pt will be able to resume gym work out routine without pain.   Partially MET    PLAN  [x]  Upgrade activities as tolerated     [x]  Continue plan of care  []  Update interventions per flow sheet       []  Discharge due to:_  []  Other:_      Heather Rao PTA 7/12/2017  11:10 AM

## 2017-07-12 NOTE — PROGRESS NOTES
Chief Complaint   Patient presents with    Leg Pain     right leg pain x1 year     she is a 43y.o. year old female who presents for follow up of injury. Follow Up Pain Assessment Encounter      Onset of Symptoms: 1 year  ________________________________________________________________________  Description: Pain is now 70% has improved       Pain Scale:(1-10): 1  Duration:  intermittent  What makes it better?: rest  What makes it worse?:exercise and strecthing  Medications tried: ibuprofen  Modalities tried: PT        Reviewed and agree with Nurse Note and duplicated in this note. Reviewed PmHx, RxHx, FmHx, SocHx, AllgHx and updated and dated in the chart. Family History   Problem Relation Age of Onset    Diabetes Mother     Thyroid Disease Mother      hypothyroidism    Hypertension Father     High Cholesterol Father     Thyroid Disease Father      goiter, hyperthyroidism    Heart Attack Father      in 29's    Diabetes Maternal Grandmother     Diabetes Maternal Grandfather     Diabetes Paternal Grandmother     Diabetes Paternal Grandfather        Past Medical History:   Diagnosis Date    Anxiety     Depression     Nontoxic multinodular goiter     Followed with [facility] q6 months starting 7/2015 0 Dr. Curt Chilel        Social History     Social History    Marital status: SINGLE     Spouse name: N/A    Number of children: 0    Years of education: N/A     Occupational History     - coaching other teachers as well      Social History Main Topics    Smoking status: Former Smoker     Packs/day: 1.50     Years: 24.00     Types: Cigarettes     Quit date: 8/8/2015    Smokeless tobacco: Not on file      Comment: Has tried hypnotherapy, ecig, Wellbutrin. Chantix worked!     Alcohol use 0.0 oz/week     0 Standard drinks or equivalent per week      Comment: 1-2 glass with dinner 3x/week, 2 glasses Friday/Saturday    Drug use: No    Sexual activity: Yes     Partners: Male Birth control/ protection: Rhythm     Other Topics Concern    Exercise Yes     2x/week yoga & strength    Seat Belt Yes    Self-Exams No     Social History Narrative    Lives with one dog. Boyfriend - dating off and on since 2012 - steady since Sept 2015. Losing weight with ZG in 3749-1802! Review of Systems - negative except as listed above      Objective: There were no vitals filed for this visit. Physical Examination: General appearance - alert, well appearing, and in no distress  Back exam - full range of motion, no tenderness, palpable spasm or pain on motion  Neurological - alert, oriented, normal speech, no focal findings or movement disorder noted  Musculoskeletal - right knee exam:  The patient'sright Knee  is  normal to inspection. The ROM is normal and there is flexion to 60 Effusion is: mild The joint exhibits  absent warmth and Crepitus is: mild. The Shankar test is:  Negative Joint Line Tenderness is  Negative . The [facility] Test is Negative   and the Lachman is  negative   . The Anterior Drawer is  Negative. The Posterior Drawer is:  negative. Valgus Stress (for MCL) is:  normal . Varus Stress (for LCL) is  normal  . The Mook Test is negative and the Apprehension Sign:   negative      Extremities - peripheral pulses normal, no pedal edema, no clubbing or cyanosis  Skin - normal coloration and turgor, no rashes, no suspicious skin lesions noted    Assessment/ Plan:   Dominga Valencia was seen today for leg pain.     Diagnoses and all orders for this visit:    Patellofemoral disorder of right knee  -     XR KNEE RT MIN 4 V; Future  -     REFERRAL TO PHYSICAL THERAPY    Chronic pain of right ankle     Follow-up Disposition: Not on File    Pathophysiology, recovery and rehabilitation process discussed and questions answered   Counseling for 30 Minutes of the total visit duration   Pictures and figures used as necessary   Provided reassurance   Handouts provided and reviewed with patient for PFPS  Monitor response to Physical Therapy   Recommend activity modification   Recommend  lower impact activities-walking, Eliptical, Nordic Track, cycling or swimming   Follow up in 4 week(s)      I have discussed the diagnosis with the patient and the intended plan as seen in the above orders. The patient has received an after-visit summary and questions were answered concerning future plans. Medication Side Effects and Warnings were discussed with patient: yes  Patient Labs were reviewed and or requested: yes  Patient Past Records were reviewed and or requested  yes  I have discussed the diagnosis with the patient and the intended plan as seen in the above orders. The patient has received an after-visit summary and questions were answered concerning future plans. Pt agrees to call or return to clinic and/or go to closest ER with any worsening of symptoms. This may include, but not limited to increased fever (>100.4) with NSAIDS or Tylenol, increased edema, confusion, rash, worsening of presenting symptoms. Patient was informed/counseled to:    1) Remember to stay active and/or exercise regularly (I suggest 30-45 minutes daily)   2) For reliable dietary information, go to www. EATRIGHT.org. You may wish to consider seeing the nutritionist at [facility] at #[phone] or [phone], also consider the [address]   3) I routinely suggest a complete physical exam once each year (your birth month)

## 2017-07-12 NOTE — MR AVS SNAPSHOT
Visit Information Date & Time Provider Department Dept. Phone Encounter #  
 7/12/2017  4:30 PM [facility] [phone] [id number] Follow-up Instructions Return in about 4 weeks (around 8/9/2017), or if symptoms worsen or fail to improve. Follow-up and Disposition History Upcoming Health Maintenance Date Due INFLUENZA AGE 9 TO ADULT 8/1/2017 PAP AKA CERVICAL CYTOLOGY 6/15/2018 DTaP/Tdap/Td series (2 - Td) 5/23/2023 Allergies as of 7/12/2017  Review Complete On: 7/12/2017 By: [facility] MD Willie  
 No Known Allergies Current Immunizations  Reviewed on 11/18/2016 Name Date Influenza Vaccine (Quad) PF 11/18/2016, 12/2/2015 Tdap 5/23/2013 Not reviewed this visit You Were Diagnosed With   
  
 Codes Comments Patellofemoral disorder of right knee    -  Primary ICD-10-CM: M22.2X1 ICD-9-CM: 719.96 Chronic pain of right ankle     ICD-10-CM: M25.571, G89.29 ICD-9-CM: 719.47, 338.29 Vitals OB Status Smoking Status Having regular periods Former Smoker Preferred Pharmacy Pharmacy Name Phone [facility] [address] [place] [address] Your Updated Medication List  
  
   
This list is accurate as of: 7/12/17  5:03 PM.  Always use your most recent med list.  
  
  
  
  
 ALPRAZolam 0.25 mg tablet Commonly known as:  XANAX  
TAKE ONE TABLET BY MOUTH TWICE DAILY AS NEEDED FOR ANXIETY *MAX  DAILY  AMOUNT  IS  2  TABS*  
  
 busPIRone 10 mg tablet Commonly known as:  BUSPAR  
TAKE ONE TABLET BY MOUTH THREE TIMES DAILY AS NEEDED  
  
 cholecalciferol (VITAMIN D3) 5,000 unit Tab tablet Commonly known as:  VITAMIN D3 Take  by mouth daily. fexofenadine-pseudoephedrine  mg Tb12 Commonly known as:  ALLEGRA-D Take 1 Tab by mouth every twelve (12) hours. multivitamin tablet Commonly known as:  ONE A DAY  
 Take 1 Tab by mouth daily. QNASL 80 mcg/actuation Hfaa Generic drug:  beclomethasone dipropionate  
  
 traZODone 100 mg tablet Commonly known as:  DESYREL  
TAKE ONE TABLET BY MOUTH ONCE DAILY AT  NIGHT We Performed the Following REFERRAL TO PHYSICAL THERAPY [GTN21 Custom] Follow-up Instructions Return in about 4 weeks (around 8/9/2017), or if symptoms worsen or fail to improve. To-Do List   
 07/12/2017 Imaging:  XR KNEE RT MIN 4 V   
  
 07/18/2017 5:30 PM  
  Appointment with Isidro Green at [facility] ([phone])  
  
 07/25/2017 5:00 PM  
  Appointment with Isidro Green at [facility] ([phone])  
  
 07/27/2017 5:00 PM  
  Appointment with Isidro Green at [facility] ([phone]) Referral Information Referral ID Referred By Referred To  
  
 [id number] [facility] OP PT [facility]   
   [address] Phone: [phone] Fax: [phone] Visits Status Start Date End Date  
 20 New Request 7/12/17 7/12/18 If your referral has a status of pending review or denied, additional information will be sent to support the outcome of this decision. Introducing [facility]! Dear Jan Polanco: 
Thank you for requesting a [employer] account. Our records indicate that you already have an active [employer] account. You can access your account anytime at [email or web address]. [email or web address] Did you know that you can access your hospital and ER discharge instructions at any time in [employer]? You can also review all of your test results from your hospital stay or ER visit. Additional Information If you have questions, please visit the Frequently Asked Questions section of the [employer] website at [email or web address] Remember, [employer]ale is NOT to be used for urgent needs. For medical emergencies, dial 911. Now available from your iPhone and Android! Please provide this summary of care documentation to your next provider. Your primary care clinician is listed as Stephie Bliss. If you have any questions after today's visit, please call [phone].

## 2017-07-12 NOTE — PROGRESS NOTES
[facility] Physical Therapy   [address]  [place], [address]  Phone: [phone] Fax: [phone]    Progress Note    Name: Oleg Munoz   : 1975   MD: Davidson Early MD       Treatment Diagnosis: Right ankle pain [M25.571]  Start of Care: 17    Visits from Start of Care: 4  Missed Visits: 0    Summary of Care: Ms. Napoleon Leigh has been seen for 4 visits for R ankle and knee pain. Care includes strengthening, stabilization and neuromuscular reeducation for R LE. Pt states that she feels her ankle is better and more stable. She reports however, the dull ache in her R knee located on the posterior/lateral aspect remains and is aggravated by climbing up stairs, and squatting. Long Term Goals: To be accomplished in 2-4 weeks:  1) Pt will be able to squat without pain. Partially MET (pt still reports ache in R knee)  2) Pt will be able to navigate uneven terrain without ankle pain or instability. MET  3) Pt will be able to resume gym work out routine without pain. Partially MET    Assessment / Recommendations:  Recommend pt continue with PT for 2-4 weeks to continue strengthening/stabilization and prepare for return to gym activity. Please advise. Skyler Chacko, PT, DPT, ATC, MTC  Lincoln Daryon, PTA     2017 11:13 AM    ________________________________________________________________________  NOTE TO PHYSICIAN:  Please complete the following and fax to: [facility]: [phone]  . Retain this original for your records. If you are unable to process this request in 24 hours, please contact our office.        ____ I have read the above report and request that my patient continue therapy with the following changes/special instructions:  ____ I have read the above report and request that my patient be discharged from therapy    Physician's Signature:_________________ Date:___________Time:__________

## 2017-07-18 ENCOUNTER — HOSPITAL ENCOUNTER (OUTPATIENT)
Dept: PHYSICAL THERAPY | Age: 42
Discharge: HOME | End: 2017-07-18
Payer: COMMERCIAL

## 2017-07-18 PROCEDURE — 97110 THERAPEUTIC EXERCISES: CPT

## 2017-07-18 NOTE — PROGRESS NOTES
PT DAILY TREATMENT NOTE - [facility] 2-15    Patient Name: Rox Graves  Date:2017  : 1975  [x]  Patient  Verified  Payor: Juan Rodriguez / Plan: Cynthia Velez / Product Type: HMO /    In time: 5:30P  Out time: 6:30P  Total Treatment Time (min): 60  Total Timed Codes (min): 60  1:1 Treatment Time ( only): --   Visit #: 5     Treatment Area: Right ankle pain [M25.571]    SUBJECTIVE  Pain Level (0-10 scale): 0/10  Any medication changes, allergies to medications, adverse drug reactions, diagnosis change, or new procedure performed?: [x] No    [] Yes (see summary sheet for update)  Subjective functional status/changes:   [] No changes reported  \"I have been feeling an ache in my knee but it has not been terrible. \"    OBJECTIVE     60 min Therapeutic Exercise:  [x] See flow sheet :   Rationale: increase ROM, increase strength and improve coordination to improve the patients ability to increase tolerance to daily and gym related activities           With   [] TE   [] TA   [] neuro   [] other: Patient Education: [x] Review HEP    [] Progressed/Changed HEP based on:   [] positioning   [] body mechanics   [] transfers   [] heat/ice application    [] other:      Other Objective/Functional Measures: --     Pain Level (0-10 scale) post treatment: 0/10    ASSESSMENT/Changes in Function:   Added strengthening and stabilization exercises today. Pt tolerated increase without increase in pain or discomfort in knee. Pt FU with Dr Kezia Multani last week and was issued a brace to keep patella from tracking laterally. Pt stated she was instructed to wear it unless she was at home.    Patient will continue to benefit from skilled PT services to modify and progress therapeutic interventions, address functional mobility deficits, address ROM deficits, address strength deficits, analyze and address soft tissue restrictions, analyze and cue movement patterns and analyze and modify body mechanics/ergonomics to attain remaining goals. [x]  See Plan of Care  []  See progress note/recertification  []  See Discharge Summary         Progress towards goals / Updated goals:  Long Term Goals: To be accomplished in 2-4 weeks:  1) Pt will be able to squat without pain. Partially MET (pt still reports ache in R knee)  2) Pt will be able to navigate uneven terrain without ankle pain or instability. MET  3) Pt will be able to resume gym work out routine without pain.   Partially MET    PLAN  [x]  Upgrade activities as tolerated     [x]  Continue plan of care  []  Update interventions per flow sheet       []  Discharge due to:_  []  Other:_      Zelalem Puckett, PTA 7/18/2017  11:10 AM

## 2017-07-19 ENCOUNTER — OFFICE VISIT (OUTPATIENT)
Dept: INTERNAL MEDICINE CLINIC | Facility: CLINIC | Age: 42
End: 2017-07-19

## 2017-07-19 ENCOUNTER — TELEPHONE (OUTPATIENT)
Dept: INTERNAL MEDICINE CLINIC | Facility: CLINIC | Age: 42
End: 2017-07-19

## 2017-07-19 VITALS
HEART RATE: 81 BPM | BODY MASS INDEX: 33.29 KG/M2 | OXYGEN SATURATION: 95 % | RESPIRATION RATE: 18 BRPM | TEMPERATURE: 97.7 F | HEIGHT: 64 IN | WEIGHT: 195 LBS | SYSTOLIC BLOOD PRESSURE: 121 MMHG | DIASTOLIC BLOOD PRESSURE: 78 MMHG

## 2017-07-19 DIAGNOSIS — E66.9 OBESITY (BMI 30-39.9): ICD-10-CM

## 2017-07-19 DIAGNOSIS — F41.9 ANXIETY: ICD-10-CM

## 2017-07-19 DIAGNOSIS — N39.0 CHRONIC UTI: Primary | ICD-10-CM

## 2017-07-19 LAB
BILIRUB UR QL STRIP: NORMAL
GLUCOSE UR-MCNC: NORMAL MG/DL
KETONES P FAST UR STRIP-MCNC: NORMAL MG/DL
PH UR STRIP: 5 [PH] (ref 4.6–8)
PROT UR QL STRIP: NORMAL MG/DL
SP GR UR STRIP: 1.02 (ref 1–1.03)
UA UROBILINOGEN AMB POC: NORMAL (ref 0.2–1)
URINALYSIS CLARITY POC: NORMAL
URINALYSIS COLOR POC: NORMAL
URINE BLOOD POC: NORMAL
URINE LEUKOCYTES POC: NORMAL
URINE NITRITES POC: POSITIVE

## 2017-07-19 RX ORDER — NITROFURANTOIN 25; 75 MG/1; MG/1
100 CAPSULE ORAL
Qty: 30 CAP | Refills: 2 | Status: SHIPPED | OUTPATIENT
Start: 2017-07-19 | End: 2017-10-03

## 2017-07-19 RX ORDER — ALPRAZOLAM 0.25 MG/1
TABLET ORAL
Qty: 30 TAB | Refills: 1 | Status: SHIPPED | OUTPATIENT
Start: 2017-08-19 | End: 2018-01-30 | Stop reason: SDUPTHER

## 2017-07-19 NOTE — PROGRESS NOTES
HISTORY OF PRESENT ILLNESS  Octavia Farr is a 43 y.o. female. Chief Complaint   Patient presents with    Urinary Frequency     Patient states she felt burning and pressure with urinary frequency. Took Azo this morning       HPI   1) UTI - Hx chronic UTI. Has had postcoital Macrobid but ran out    Results for orders placed or performed in visit on 07/19/17   AMB POC URINALYSIS DIP STICK AUTO W/O MICRO     Status: None   Result Value Ref Range Status    Color (UA POC) Orange  Final    Clarity (UA POC) Slightly Cloudy  Final    Glucose (UA POC) Trace Negative Final    Bilirubin (UA POC) 1+ Negative Final    Ketones (UA POC) Trace Negative Final    Specific gravity (UA POC) 1.020 1.001 - 1.035 Final    Blood (UA POC) 3+ Negative Final    pH (UA POC) 5.0 4.6 - 8.0 Final    Protein (UA POC) 3+ Negative mg/dL Final    Urobilinogen (UA POC) 2 mg/dL 0.2 - 1 Final    Nitrites (UA POC) Positive Negative Final    Leukocyte esterase (UA POC) 3+ Negative Final       2) Weight increase with stress at work over the past year. Going to go back to , recently started exercising regularly. Starting to look for another job. Wt Readings from Last 3 Encounters:   07/19/17 195 lb (88.5 kg)   06/07/17 198 lb (89.8 kg)   05/16/17 200 lb (90.7 kg)       Review of Systems   Constitutional: Negative for fever. Genitourinary: Positive for dysuria, frequency and urgency. Negative for flank pain and hematuria. Physical Exam   Constitutional: She is oriented to person, place, and time. She appears well-developed and well-nourished. No distress. HENT:   Head: Normocephalic and atraumatic. Neck: Neck supple. No JVD present. Cardiovascular: Normal rate, regular rhythm and normal heart sounds. Pulmonary/Chest: Effort normal and breath sounds normal. No respiratory distress. Musculoskeletal: She exhibits no edema. Neurological: She is alert and oriented to person, place, and time. Skin: Skin is warm and dry. Psychiatric: She has a normal mood and affect. Her behavior is normal. Judgment and thought content normal.   Nursing note and vitals reviewed. ASSESSMENT and PLAN    ICD-10-CM ICD-9-CM    1. Chronic UTI N39.0 599.0 AMB POC URINALYSIS DIP STICK AUTO W/O MICRO      nitrofurantoin, macrocrystal-monohydrate, (MACROBID) 100 mg capsule   2. Anxiety F41.9 300.00 ALPRAZolam (XANAX) 0.25 mg tablet   3. Obesity (BMI 30-39. 9) E66.9 278.00 Discussed diet/exercise, stress management. Congratulated pt on taking positive actions to manage stress.

## 2017-07-19 NOTE — TELEPHONE ENCOUNTER
Pharmacy wanted to verify if the WILLY Han wanted to send Macrobid instead of Macrodantin? Patient has previously used Macrodantin. Usually, physicians wants to prescribe the same medication as before. Please advise!

## 2017-07-19 NOTE — PROGRESS NOTES
Room 7    Chief Complaint   Patient presents with    Urinary Frequency     Patient states she felt burning and pressure with urinary frequency. Took Azo this morning     1. Have you been to the ER, urgent care clinic since your last visit? Hospitalized since your last visit? No    2. Have you seen or consulted any other health care providers outside of the [address] [place] [address] since your last visit? Include any pap smears or colon screening.  No     No Hm due

## 2017-07-19 NOTE — MR AVS SNAPSHOT
Visit Information Date & Time Provider Department Dept. Phone Encounter #  
 7/19/2017  8:45 AM Robb Pringle PA-C [facility] Internal Medicine [phone] [id number] Upcoming Health Maintenance Date Due INFLUENZA AGE 9 TO ADULT 8/1/2017 PAP AKA CERVICAL CYTOLOGY 6/15/2018 DTaP/Tdap/Td series (2 - Td) 5/23/2023 Allergies as of 7/19/2017  Review Complete On: 7/12/2017 By: [address] [place] [address] MD Willie  
 No Known Allergies Current Immunizations  Reviewed on 11/18/2016 Name Date Influenza Vaccine (Quad) PF 11/18/2016, 12/2/2015 Tdap 5/23/2013 Not reviewed this visit You Were Diagnosed With   
  
 Codes Comments Chronic UTI    -  Primary ICD-10-CM: N39.0 ICD-9-CM: 599.0 Anxiety     ICD-10-CM: F41.9 ICD-9-CM: 300.00 Vitals BP Pulse Temp Resp Height(growth percentile) Weight(growth percentile) 121/78 (BP 1 Location: Left arm, BP Patient Position: Sitting) 81 97.7 °F (36.5 °C) (Oral) 18 5' 4\" (1.626 m) 195 lb (88.5 kg) LMP SpO2 BMI OB Status Smoking Status 06/27/2017 95% 33.47 kg/m2 Having regular periods Former Smoker Vitals History BMI and BSA Data Body Mass Index Body Surface Area  
 33.47 kg/m 2 2 m 2 Preferred Pharmacy Pharmacy Name Phone [facility] [address] [place] [address] Your Updated Medication List  
  
   
This list is accurate as of: 7/19/17  9:31 AM.  Always use your most recent med list.  
  
  
  
  
 ALPRAZolam 0.25 mg tablet Commonly known as:  XANAX  
TAKE ONE TABLET BY MOUTH TWICE DAILY AS NEEDED FOR ANXIETY *MAX  DAILY  AMOUNT  IS  2  TABS*  Indications: anxiety Start taking on:  8/19/2017 AZO PO Take  by mouth.  
  
 busPIRone 10 mg tablet Commonly known as:  BUSPAR  
TAKE ONE TABLET BY MOUTH THREE TIMES DAILY AS NEEDED  
  
 cholecalciferol (VITAMIN D3) 5,000 unit Tab tablet Commonly known as:  VITAMIN D3  
 Take  by mouth daily. fexofenadine-pseudoephedrine  mg Tb12 Commonly known as:  ALLEGRA-D Take 1 Tab by mouth every twelve (12) hours. multivitamin tablet Commonly known as:  ONE A DAY Take 1 Tab by mouth daily. nitrofurantoin (macrocrystal-monohydrate) 100 mg capsule Commonly known as:  MACROBID Take 1 Cap by mouth once as needed. Use as needed after intercourse. QNASL 80 mcg/actuation Hfaa Generic drug:  beclomethasone dipropionate  
  
 traZODone 100 mg tablet Commonly known as:  DESYREL  
TAKE ONE TABLET BY MOUTH ONCE DAILY AT  NIGHT Prescriptions Printed Refills ALPRAZolam (XANAX) 0.25 mg tablet 1 Starting on: 8/19/2017 Sig: TAKE ONE TABLET BY MOUTH TWICE DAILY AS NEEDED FOR ANXIETY *MAX  DAILY  AMOUNT  IS  2  TABS*  Indications: anxiety Class: Print Prescriptions Sent to Pharmacy Refills  
 nitrofurantoin, macrocrystal-monohydrate, (MACROBID) 100 mg capsule 2 Sig: Take 1 Cap by mouth once as needed. Use as needed after intercourse. Class: Normal  
 Pharmacy: [address], [facility] [place] [address] Ph #: [phone] Route: Oral  
  
We Performed the Following AMB POC URINALYSIS DIP STICK AUTO W/O MICRO [92515 CPT(R)] To-Do List   
 07/25/2017 5:00 PM  
  Appointment with Bradley Key at [facility] ([phone])  
  
 07/27/2017 5:00 PM  
  Appointment with Bradley Key at [facility] ([phone]) Patient Instructions Macrobid: take 1 by mouth twice daily for 7 days. Then postcoitally only as needed. Introducing [facility]! Dear Derek Trujillo: 
Thank you for requesting a [employer] account. Our records indicate that you already have an active Flow[employer] account. You can access your account anytime at [email or web address] Did you know that you can access your hospital and ER discharge instructions at any time in [employer]? You can also review all of your test results from your hospital stay or ER visit. Additional Information If you have questions, please visit the Frequently Asked Questions section of the [employer] website at [email or web address] Remember, [employer] is NOT to be used for urgent needs. For medical emergencies, dial 911. Now available from your iPhone and Android! Please provide this summary of care documentation to your next provider. Your primary care clinician is listed as Christopher Schilling. If you have any questions after today's visit, please call [phone].

## 2017-07-20 ENCOUNTER — TELEPHONE (OUTPATIENT)
Dept: INTERNAL MEDICINE CLINIC | Facility: CLINIC | Age: 42
End: 2017-07-20

## 2017-07-20 RX ORDER — FLUCONAZOLE 150 MG/1
150 TABLET ORAL
Qty: 2 TAB | Refills: 0 | Status: SHIPPED | OUTPATIENT
Start: 2017-07-20 | End: 2017-07-28

## 2017-07-20 NOTE — TELEPHONE ENCOUNTER
Patient requested a prescription for a yeast infection. She was recently prescribed an antibiotic, which caused it. Please advise!

## 2017-07-25 ENCOUNTER — HOSPITAL ENCOUNTER (OUTPATIENT)
Dept: PHYSICAL THERAPY | Age: 42
Discharge: HOME | End: 2017-07-25
Payer: COMMERCIAL

## 2017-07-25 PROCEDURE — 97112 NEUROMUSCULAR REEDUCATION: CPT

## 2017-07-25 PROCEDURE — 97110 THERAPEUTIC EXERCISES: CPT

## 2017-07-25 NOTE — PROGRESS NOTES
PT DAILY TREATMENT NOTE - [facility] 2-15    Patient Name: Digna Rao  Date:2017  : 1975  [x]  Patient  Verified  Payor: Lanier Rubinstein / Plan: Shereen Canas / Product Type: HMO /    In time: 5:10P  Out time: 6:20P  Total Treatment Time (min): 70  Total Timed Codes (min): 70  1:1 Treatment Time ( only): --   Visit #: 6     Treatment Area: Right ankle pain [M25.571]    SUBJECTIVE  Pain Level (0-10 scale): 3/10  Any medication changes, allergies to medications, adverse drug reactions, diagnosis change, or new procedure performed?: [x] No    [] Yes (see summary sheet for update)  Subjective functional status/changes:   [] No changes reported  \"I was sore after my last visit but it did not last long. I did not do a good job keeping up with my HEP this week like I should have. \"    OBJECTIVE     55 min Therapeutic Exercise:  [x] See flow sheet :   Rationale: increase ROM, increase strength and improve coordination to improve the patients ability to increase tolerance to daily and gym related activities    15 min Neuromuscular Re-education:  [x]  See flow sheet :   Rationale: increase strength, improve coordination, improve balance and increase proprioception  to improve the patients ability to increase R LE stability and quad strength           With   [] TE   [] TA   [] neuro   [] other: Patient Education: [x] Review HEP    [] Progressed/Changed HEP based on:   [] positioning   [] body mechanics   [] transfers   [] heat/ice application    [] other:      Other Objective/Functional Measures: FOTO: 66/100    Pain Level (0-10 scale) post treatment: 0/10    ASSESSMENT/Changes in Function:   Pt demonstrated increase quad control with dips and trampoline SL squat. Pt stated that she notices her calf will cramp sometimes at night. She stated she is working to make more of an effort to drink more water during the day. Pt will FU with ROMEL Solorio next visit.   Patient will continue to benefit from skilled PT services to modify and progress therapeutic interventions, address functional mobility deficits, address ROM deficits, address strength deficits, analyze and address soft tissue restrictions, analyze and cue movement patterns and analyze and modify body mechanics/ergonomics to attain remaining goals. [x]  See Plan of Care  []  See progress note/recertification  []  See Discharge Summary         Progress towards goals / Updated goals:  Long Term Goals: To be accomplished in 2-4 weeks:  1) Pt will be able to squat without pain. Partially MET (pt still reports ache in R knee)  2) Pt will be able to navigate uneven terrain without ankle pain or instability. MET  3) Pt will be able to resume gym work out routine without pain.   Partially MET    PLAN  [x]  Upgrade activities as tolerated     [x]  Continue plan of care  []  Update interventions per flow sheet       []  Discharge due to:_  []  Other:_      Tramaine Jauregui PTA 7/25/2017  11:10 AM

## 2017-07-27 ENCOUNTER — HOSPITAL ENCOUNTER (OUTPATIENT)
Dept: PHYSICAL THERAPY | Age: 42
Discharge: HOME | End: 2017-07-27
Payer: COMMERCIAL

## 2017-07-27 PROCEDURE — 97112 NEUROMUSCULAR REEDUCATION: CPT

## 2017-07-27 PROCEDURE — 97110 THERAPEUTIC EXERCISES: CPT

## 2017-07-27 NOTE — PROGRESS NOTES
PT DAILY TREATMENT NOTE/PROGRESS SUMMARY 2-15    Patient Name: Elda Hillman  Date:2017  : 1975  [x]  Patient  Verified  Payor: Harini Strickland / Plan: Abraham Moore / Product Type: HMO /    In time:  500 P  Out time: 555 P  Total Treatment Time (min): 55  Total Timed Codes (min): 55  1:1 Treatment Time ([facility] only): --  Visit #: 7    Treatment Area: Right ankle pain [M25.571]    SUBJECTIVE  Pain Level (0-10 scale): 3/10  Any medication changes, allergies to medications, adverse drug reactions, diagnosis change, or new procedure performed?: [x] No    [] Yes (see summary sheet for update)  Subjective functional status/changes:   [] No changes reported    Pt reports experienced R hip/lat thigh pain after last visit for the first time since before starting PT, similar to pain that she would previously experiencing after doing squats however in this case sx resolved by next day and were not as severe; otherwise pt feels that knee pain has cont to improve     OBJECTIVE     40 min Therapeutic Exercise:  [x] See flow sheet :    Rationale: increase ROM, increase strength and improve coordination to improve the patients ability to increase tolerance to daily and gym related activities    15 min Neuromuscular Re-education:  [x]  See flow sheet :   Rationale: increase strength, improve coordination, improve balance and increase proprioception  to improve the patients ability to increase R LE stability and quad strength           With   [] TE   [] TA   [] neuro   [] other: Patient Education: [x] Review HEP    [] Progressed/Changed HEP based on:   [] positioning   [] body mechanics   [] transfers   [] heat/ice application    [] other:      Other Objective/Functional Measures:     STRENGTH  Hip MMT  Flex 5/5 bilat  Ext 5/5 bilat  Glut ext R4/5 L5/5  abd 5/5 bilat  ER 5/5 bilat    Knee MMT  Flex & ext 5/5 bilat    Bridge w/ march - mod hip drop bilat    ROM/FLEXIBILITY  bilat hip/knee ROM wnl/=   90/90 test neg    PALPATION  W/out tenderness to palpation  Crepitus noted bilat patella w/ AROM knee flex,ext as well as patellar mobilization R>L    FOTO: 66/100    Pain Level (0-10 scale) post treatment: 3/10    ASSESSMENT/Changes in Function:     Pt demonstrates improved LE strength, decr report of pain, report of improved function as well as improved FOTO score from 55/100 initially to 66/100. Today w/ report of R hip/lat thigh soreness after last visit, feel may be due to incr resistance w/ standing clamshell so decr resistance w/ this today. Patient will continue to benefit from skilled PT services to modify and progress therapeutic interventions, address functional mobility deficits, address ROM deficits, address strength deficits, analyze and address soft tissue restrictions, analyze and cue movement patterns and analyze and modify body mechanics/ergonomics to attain remaining goals. [x]  See Plan of Care  []  See progress note/recertification  []  See Discharge Summary         Progress towards goals / Updated goals:  Long Term Goals: To be accomplished in 2-4 weeks:  1) Pt will be able to squat without pain. Partially MET (pt still reports ache in R knee)  2) Pt will be able to navigate uneven terrain without ankle pain or instability. MET  3) Pt will be able to resume gym work out routine without pain.   Partially MET    PLAN  [x]  Upgrade activities as tolerated     [x]  Continue plan of care  []  Update interventions per flow sheet       []  Discharge due to:_  []  Other:_      Nolvia Pradhan, PT 7/27/2017  609 PM

## 2017-08-01 ENCOUNTER — HOSPITAL ENCOUNTER (OUTPATIENT)
Dept: PHYSICAL THERAPY | Age: 42
Discharge: HOME | End: 2017-08-01
Payer: COMMERCIAL

## 2017-08-01 PROCEDURE — 97112 NEUROMUSCULAR REEDUCATION: CPT

## 2017-08-01 PROCEDURE — 97110 THERAPEUTIC EXERCISES: CPT

## 2017-08-01 PROCEDURE — 97140 MANUAL THERAPY 1/> REGIONS: CPT

## 2017-08-01 NOTE — PROGRESS NOTES
PT DAILY TREATMENT NOTE - [facility] 2-15    Patient Name: Kailey Quiñones  Date:2017  : 1975  [x]  Patient  Verified  Payor: Elois Schlatter / Plan: Anamaria Mask / Product Type: HMO /    In time: 10:00A  Out time: 10:45A  Total Treatment Time (min): 45  Total Timed Codes (min):35  1:1 Treatment Time ( [address] only): --   Visit #: 8     Treatment Area: Right ankle pain [M25.571]    SUBJECTIVE  Pain Level (0-10 scale): 1-2/10  Any medication changes, allergies to medications, adverse drug reactions, diagnosis change, or new procedure performed?: [x] No    [] Yes (see summary sheet for update)  Subjective functional status/changes:   [] No changes reported  \"I am feeling good today. My knee pain is not too bad today. \"    OBJECTIVE     Modality rationale: decrease edema, decrease inflammation and decrease pain to improve the patients ability to decrease post therapy soreness    Min Type Additional Details    [] Estim: []Att   []Unatt        []TENS instruct                  []IFC  []Premod   []NMES                     []Other:  []w/US   []w/ice   []w/heat  Position:  Location:    []  Traction: [] Cervical       []Lumbar                       [] Prone          []Supine                       []Intermittent   []Continuous Lbs:  [] before manual  [] after manual  []w/heat    []  Ultrasound: []Continuous   [] Pulsed at:                           []1MHz   []3MHz Location:  W/cm2:    [] Paraffin         Location:   []w/heat   10 post [x]  Ice     []  Heat  []  Ice massage Position:supine with bolster  Location: R knee    []  Laser  []  Other: Position:  Location:      []  Vasopneumatic Device Pressure:       [] lo [] med [] hi   Temperature:      [x] Skin assessment post-treatment:  [x]intact []redness- no adverse reaction    []redness - adverse reaction:     25 min Therapeutic Exercise:  [x] See flow sheet :   Rationale: increase ROM, increase strength and improve coordination to improve the patients ability to increase tolerance to daily and gym related activities    10 min Neuromuscular Re-education:  [x]  See flow sheet :   Rationale: increase strength, improve coordination, improve balance and increase proprioception  to improve the patients ability to increase R LE stability and quad strength           With   [] TE   [] TA   [] neuro   [] other: Patient Education: [x] Review HEP    [] Progressed/Changed HEP based on:   [] positioning   [] body mechanics   [] transfers   [] heat/ice application    [] other:      Other Objective/Functional Measures: --    Pain Level (0-10 scale) post treatment: 0/10    ASSESSMENT/Changes in Function:   Pt's program limited today secondary to time constrain. Continue to progress as tolerated. Patient will continue to benefit from skilled PT services to modify and progress therapeutic interventions, address functional mobility deficits, address ROM deficits, address strength deficits, analyze and address soft tissue restrictions, analyze and cue movement patterns and analyze and modify body mechanics/ergonomics to attain remaining goals. [x]  See Plan of Care  []  See progress note/recertification  []  See Discharge Summary         Progress towards goals / Updated goals:  Long Term Goals: To be accomplished in 2-4 weeks:  1) Pt will be able to squat without pain. Partially MET (pt still reports ache in R knee)  2) Pt will be able to navigate uneven terrain without ankle pain or instability. MET  3) Pt will be able to resume gym work out routine without pain.   Partially MET    PLAN  [x]  Upgrade activities as tolerated     [x]  Continue plan of care  []  Update interventions per flow sheet       []  Discharge due to:_  []  Other:_      Greta Kaur PTA 8/1/2017  11:10 AM

## 2017-08-03 ENCOUNTER — HOSPITAL ENCOUNTER (OUTPATIENT)
Dept: PHYSICAL THERAPY | Age: 42
Discharge: HOME | End: 2017-08-03
Payer: COMMERCIAL

## 2017-08-03 PROCEDURE — 97110 THERAPEUTIC EXERCISES: CPT

## 2017-08-03 PROCEDURE — 97112 NEUROMUSCULAR REEDUCATION: CPT

## 2017-08-03 NOTE — PROGRESS NOTES
PT DAILY TREATMENT NOTE - [facility] 2-15    Patient Name: Opal Bah  Date:8/3/2017  : 1975  [x]  Patient  Verified  Payor: Nevin Thomas / Plan: Elgin Davis / Product Type: HMO /    In time: 12:00P Out time: 12:50P  Total Treatment Time (min): 50  Total Timed Codes (min): 50  1:1 Treatment Time ( [address] only): --   Visit #: 9    Treatment Area: Right ankle pain [M25.571]    SUBJECTIVE  Pain Level (0-10 scale): 0/10  Any medication changes, allergies to medications, adverse drug reactions, diagnosis change, or new procedure performed?: [x] No    [] Yes (see summary sheet for update)  Subjective functional status/changes:   [] No changes reported  \"I did not have increase in pain after last visit. I am only wearing the brace when I knew I will be navigating a lot of stairs. It still provides me with support so my knee does not hurt as much. I still have not returned to the gym yet due to not having time. \"     OBJECTIVE     Modality rationale: decrease edema, decrease inflammation and decrease pain to improve the patients ability to decrease post therapy soreness    Min Type Additional Details    [] Estim: []Att   []Unatt        []TENS instruct                  []IFC  []Premod   []NMES                     []Other:  []w/US   []w/ice   []w/heat  Position:  Location:    []  Traction: [] Cervical       []Lumbar                       [] Prone          []Supine                       []Intermittent   []Continuous Lbs:  [] before manual  [] after manual  []w/heat    []  Ultrasound: []Continuous   [] Pulsed at:                           []1MHz   []3MHz Location:  W/cm2:    [] Paraffin         Location:   []w/heat   10 post [x]  Ice     []  Heat  []  Ice massage Position:supine with bolster  Location: R knee    []  Laser  []  Other: Position:  Location:      []  Vasopneumatic Device Pressure:       [] lo [] med [] hi   Temperature:      [x] Skin assessment post-treatment:  [x]intact []redness- no adverse reaction []redness - adverse reaction:     40 min Therapeutic Exercise:  [x] See flow sheet :   Rationale: increase ROM, increase strength and improve coordination to improve the patients ability to increase tolerance to daily and gym related activities    10 min Neuromuscular Re-education:  [x]  See flow sheet :   Rationale: increase strength, improve coordination, improve balance and increase proprioception  to improve the patients ability to increase R LE stability and quad strength           With   [] TE   [] TA   [] neuro   [] other: Patient Education: [x] Review HEP    [] Progressed/Changed HEP based on:   [] positioning   [] body mechanics   [] transfers   [] heat/ice application    [] other:      Other Objective/Functional Measures: --    Pain Level (0-10 scale) post treatment: 0/10    ASSESSMENT/Changes in Function:   Pt's program limited secondary to time constrain. Pt continue to demonstrate increase control and stability in R quad. Patient will continue to benefit from skilled PT services to modify and progress therapeutic interventions, address functional mobility deficits, address ROM deficits, address strength deficits, analyze and address soft tissue restrictions, analyze and cue movement patterns and analyze and modify body mechanics/ergonomics to attain remaining goals. [x]  See Plan of Care  []  See progress note/recertification  []  See Discharge Summary         Progress towards goals / Updated goals:  Long Term Goals: To be accomplished in 2-4 weeks:  1) Pt will be able to squat without pain. Partially MET (pt still reports ache in R knee)  2) Pt will be able to navigate uneven terrain without ankle pain or instability. MET  3) Pt will be able to resume gym work out routine without pain.   Partially MET    PLAN  [x]  Upgrade activities as tolerated     [x]  Continue plan of care  []  Update interventions per flow sheet       []  Discharge due to:_  []  Other:_      Greta Kaur PTA 8/3/2017  11:10 AM

## 2017-08-08 ENCOUNTER — HOSPITAL ENCOUNTER (OUTPATIENT)
Dept: PHYSICAL THERAPY | Age: 42
Discharge: HOME | End: 2017-08-08
Payer: COMMERCIAL

## 2017-08-08 PROCEDURE — 97112 NEUROMUSCULAR REEDUCATION: CPT

## 2017-08-08 PROCEDURE — 97110 THERAPEUTIC EXERCISES: CPT

## 2017-08-08 NOTE — PROGRESS NOTES
PT DAILY TREATMENT NOTE - [facility] 2-15    Patient Name: Ilia Gann  Date:2017  : 1975  [x]  Patient  Verified  Payor: Artem Oquendo / Plan: Natanael Osorio / Product Type: HMO /    In time: 3:10P Out time: 4:10P  Total Treatment Time (min): 60  Total Timed Codes (min): 50  1:1 Treatment Time ( only): --   Visit #: 10    Treatment Area: Right ankle pain [M25.571]    SUBJECTIVE  Pain Level (0-10 scale): 0/10  Any medication changes, allergies to medications, adverse drug reactions, diagnosis change, or new procedure performed?: [x] No    [] Yes (see summary sheet for update)  Subjective functional status/changes:   [] No changes reported  \"[place] did get some soreness in my knee on . I went out on the inner [place] then had to swim back to shore. But the next day I woke up and felt fine. \"     OBJECTIVE     Modality rationale: decrease edema, decrease inflammation and decrease pain to improve the patients ability to decrease post therapy soreness    Min Type Additional Details    [] Estim: []Att   []Unatt        []TENS instruct                  []IFC  []Premod   []NMES                     []Other:  []w/US   []w/ice   []w/heat  Position:  Location:    []  Traction: [] Cervical       []Lumbar                       [] Prone          []Supine                       []Intermittent   []Continuous Lbs:  [] before manual  [] after manual  []w/heat    []  Ultrasound: []Continuous   [] Pulsed at:                           []1MHz   []3MHz Location:  W/cm2:    [] Paraffin         Location:   []w/heat   10 post [x]  Ice     []  Heat  []  Ice massage Position:supine with bolster  Location: R knee    []  Laser  []  Other: Position:  Location:      []  Vasopneumatic Device Pressure:       [] lo [] med [] hi   Temperature:      [x] Skin assessment post-treatment:  [x]intact []redness- no adverse reaction    []redness - adverse reaction:     40 min Therapeutic Exercise:  [x] See flow sheet :   Rationale: increase ROM, increase strength and improve coordination to improve the patients ability to increase tolerance to daily and gym related activities    10 min Neuromuscular Re-education:  [x]  See flow sheet :   Rationale: increase strength, improve coordination, improve balance and increase proprioception  to improve the patients ability to increase R LE stability and quad strength           With   [] TE   [] TA   [] neuro   [] other: Patient Education: [x] Review HEP    [] Progressed/Changed HEP based on:   [] positioning   [] body mechanics   [] transfers   [] heat/ice application    [] other:      Other Objective/Functional Measures: --    Pain Level (0-10 scale) post treatment: 0/10    ASSESSMENT/Changes in Function:   Pt tolerated form squats with little correction on technique. Pt did not have any pain with added strengthening. Patient will continue to benefit from skilled PT services to modify and progress therapeutic interventions, address functional mobility deficits, address ROM deficits, address strength deficits, analyze and address soft tissue restrictions, analyze and cue movement patterns and analyze and modify body mechanics/ergonomics to attain remaining goals. [x]  See Plan of Care  []  See progress note/recertification  []  See Discharge Summary         Progress towards goals / Updated goals:  Long Term Goals: To be accomplished in 2-4 weeks:  1) Pt will be able to squat without pain. Partially MET (pt still reports ache in R knee)  2) Pt will be able to navigate uneven terrain without ankle pain or instability. MET  3) Pt will be able to resume gym work out routine without pain.   Partially MET    PLAN  [x]  Upgrade activities as tolerated     [x]  Continue plan of care  []  Update interventions per flow sheet       []  Discharge due to:_  []  Other:_      Tito Berger PTA 8/8/2017  11:10 AM

## 2017-08-15 ENCOUNTER — HOSPITAL ENCOUNTER (OUTPATIENT)
Dept: PHYSICAL THERAPY | Age: 42
Discharge: HOME | End: 2017-08-15
Payer: COMMERCIAL

## 2017-08-15 PROCEDURE — 97110 THERAPEUTIC EXERCISES: CPT

## 2017-08-15 PROCEDURE — 97112 NEUROMUSCULAR REEDUCATION: CPT

## 2017-08-15 NOTE — PROGRESS NOTES
PT DAILY TREATMENT NOTE - [facility] 2-15    Patient Name: Alcides Heath  Date:8/15/2017  : 1975  [x]  Patient  Verified  Payor: Saeed Cargo / Plan: Ananth Canales / Product Type: HMO /    In time: 6:00P Out time: 7:00P  Total Treatment Time (min): 60  Total Timed Codes (min): 60  1:1 Treatment Time ([address] only): --   Visit #: 11    Treatment Area: Right ankle pain [M25.571]    SUBJECTIVE  Pain Level (0-10 scale): 0/10  Any medication changes, allergies to medications, adverse drug reactions, diagnosis change, or new procedure performed?: [x] No    [] Yes (see summary sheet for update)  Subjective functional status/changes:   [] No changes reported  \"I did something to my back on . I cannot think of anything specific I just remember waking up with pain. I have had issues in the past before. \"    OBJECTIVE     Modality rationale: decrease edema, decrease inflammation and decrease pain to improve the patients ability to decrease post therapy soreness    Min Type Additional Details    [] Estim: []Att   []Unatt        []TENS instruct                  []IFC  []Premod   []NMES                     []Other:  []w/US   []w/ice   []w/heat  Position:  Location:    []  Traction: [] Cervical       []Lumbar                       [] Prone          []Supine                       []Intermittent   []Continuous Lbs:  [] before manual  [] after manual  []w/heat    []  Ultrasound: []Continuous   [] Pulsed at:                           []1MHz   []3MHz Location:  W/cm2:    [] Paraffin         Location:   []w/heat   To go [x]  Ice     []  Heat  []  Ice massage Position:  Location:     []  Laser  []  Other: Position:  Location:      []  Vasopneumatic Device Pressure:       [] lo [] med [] hi   Temperature:      [x] Skin assessment post-treatment:  [x]intact []redness- no adverse reaction    []redness - adverse reaction:     50 min Therapeutic Exercise:  [x] See flow sheet :   Rationale: increase ROM, increase strength and improve coordination to improve the patients ability to increase tolerance to daily and gym related activities    10 min Neuromuscular Re-education:  [x]  See flow sheet :   Rationale: increase strength, improve coordination, improve balance and increase proprioception  to improve the patients ability to increase R LE stability and quad strength           With   [] TE   [] TA   [] neuro   [] other: Patient Education: [x] Review HEP    [] Progressed/Changed HEP based on:   [] positioning   [] body mechanics   [] transfers   [] heat/ice application    [] other:      Other Objective/Functional Measures: --    Pain Level (0-10 scale) post treatment: 0/10    ASSESSMENT/Changes in Function:   Pt continues to be challenged with quad strengthening. Patient will continue to benefit from skilled PT services to modify and progress therapeutic interventions, address functional mobility deficits, address ROM deficits, address strength deficits, analyze and address soft tissue restrictions, analyze and cue movement patterns and analyze and modify body mechanics/ergonomics to attain remaining goals. [x]  See Plan of Care  []  See progress note/recertification  []  See Discharge Summary         Progress towards goals / Updated goals:  Long Term Goals: To be accomplished in 2-4 weeks:  1) Pt will be able to squat without pain. Partially MET (pt still reports ache in R knee)  2) Pt will be able to navigate uneven terrain without ankle pain or instability. MET  3) Pt will be able to resume gym work out routine without pain.   Partially MET    PLAN  [x]  Upgrade activities as tolerated     [x]  Continue plan of care  []  Update interventions per flow sheet       []  Discharge due to:_  []  Other:_      Indira Roberson PTA 8/15/2017  11:10 AM

## 2017-08-17 ENCOUNTER — OFFICE VISIT (OUTPATIENT)
Dept: FAMILY MEDICINE CLINIC | Age: 42
End: 2017-08-17

## 2017-08-17 VITALS
HEART RATE: 87 BPM | DIASTOLIC BLOOD PRESSURE: 79 MMHG | RESPIRATION RATE: 16 BRPM | WEIGHT: 196.4 LBS | OXYGEN SATURATION: 96 % | BODY MASS INDEX: 33.53 KG/M2 | TEMPERATURE: 99.6 F | HEIGHT: 64 IN | SYSTOLIC BLOOD PRESSURE: 131 MMHG

## 2017-08-17 DIAGNOSIS — F41.9 ANXIETY: ICD-10-CM

## 2017-08-17 DIAGNOSIS — M54.50 ACUTE LOW BACK PAIN WITHOUT SCIATICA, UNSPECIFIED BACK PAIN LATERALITY: Primary | ICD-10-CM

## 2017-08-17 DIAGNOSIS — R30.0 DYSURIA: ICD-10-CM

## 2017-08-17 LAB
BILIRUB UR QL STRIP: NEGATIVE
GLUCOSE UR-MCNC: NEGATIVE MG/DL
KETONES P FAST UR STRIP-MCNC: NEGATIVE MG/DL
PH UR STRIP: 5 [PH] (ref 4.6–8)
PROT UR QL STRIP: NEGATIVE MG/DL
SP GR UR STRIP: 1.02 (ref 1–1.03)
UA UROBILINOGEN AMB POC: NORMAL (ref 0.2–1)
URINALYSIS CLARITY POC: CLEAR
URINALYSIS COLOR POC: NORMAL
URINE BLOOD POC: NEGATIVE
URINE LEUKOCYTES POC: NEGATIVE
URINE NITRITES POC: NEGATIVE

## 2017-08-17 RX ORDER — BUSPIRONE HYDROCHLORIDE 30 MG/1
TABLET ORAL
COMMUNITY
Start: 2017-05-22 | End: 2017-10-03

## 2017-08-17 NOTE — PROGRESS NOTES
Subjective: Dillard Aase is a 43 y.o. female who presents today for UTI concern. Back pain: Low back pain since Sunday, pain rated 2/10. She notes burning with urination. She noted pain with movement, described as a dull ache. She has used PRN Macrobid and pain resolved. She also notes she has not been doing her yoga or her back stretches. She is note sure if back pain is UTI related or musculoskeletal. 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  nitrofurantoin, macrocrystal-monohydrate, (MACROBID) 100 mg capsule Take 1 Cap by mouth once as needed. Use as needed after intercourse. 30 Cap 2    traZODone (DESYREL) 100 mg tablet TAKE ONE TABLET BY MOUTH ONCE DAILY AT  NIGHT 90 Tab 0    QNASL 80 mcg/actuation HFAA       fexofenadine-pseudoephedrine (ALLEGRA-D)  mg Tb12 Take 1 Tab by mouth every twelve (12) hours.  cholecalciferol, VITAMIN D3, (VITAMIN D3) 5,000 unit tab tablet Take  by mouth daily.  multivitamin (ONE A DAY) tablet Take 1 Tab by mouth daily.       busPIRone (BUSPAR) 30 mg tablet       [START ON 8/19/2017] ALPRAZolam (XANAX) 0.25 mg tablet TAKE ONE TABLET BY MOUTH TWICE DAILY AS NEEDED FOR ANXIETY *MAX  DAILY  AMOUNT  IS  2  TABS*  Indications: anxiety 30 Tab 1     No Known Allergies  Past Medical History:   Diagnosis Date    Anxiety     Depression     Nontoxic multinodular goiter     Followed with [facility] q6 months starting 7/2015 0 Dr. Modesta Lombard       Family History   Problem Relation Age of Onset    Diabetes Mother     Thyroid Disease Mother      hypothyroidism    Hypertension Father     High Cholesterol Father     Thyroid Disease Father      goiter, hyperthyroidism    Heart Attack Father      in 29's    Diabetes Maternal Grandmother     Diabetes Maternal Grandfather     Diabetes Paternal Grandmother     Diabetes Paternal Grandfather      Social History   Substance Use Topics    Smoking status: Former Smoker     Packs/day: 1.50     Years: 24.00     Types: Cigarettes     Quit date: 8/8/2015    Smokeless tobacco: Never Used      Comment: Has tried hypnotherapy, ecig, Wellbutrin. Chantix worked!  Alcohol use 0.0 oz/week     0 Standard drinks or equivalent per week      Comment: 1-2 glass with dinner 3x/week, 2 glasses Friday/Saturday        Review of Systems    A comprehensive review of systems was negative except for that written in the HPI. Objective:     Visit Vitals    /79    Pulse 87    Temp 99.6 °F (37.6 °C) (Oral)    Resp 16    Ht 5' 4\" (1.626 m)    Wt 196 lb 6.4 oz (89.1 kg)    LMP 07/20/2017 (Approximate)    SpO2 96%    BMI 33.71 kg/m2     General appearance: alert, cooperative, no distress, appears stated age  Head: Normocephalic, without obvious abnormality, atraumatic  Eyes: negative  Back: symmetric, no curvature. ROM normal. No CVA tenderness. Straight leg raise negative, FROM. Lungs: clear to auscultation bilaterally  Heart: regular rate and rhythm, S1, S2 normal, no murmur, click, rub or gallop  Abdomen: soft, non-tender. Extremities: extremities normal, atraumatic, no cyanosis or edema  Neurologic: Grossly normal  Nursing note and vitals reviewed  Assessment/Plan:       ICD-10-CM ICD-9-CM    1. Acute low back pain without sciatica, unspecified back pain laterality M54.5 724.2    2. Dysuria R30.0 788.1 AMB POC URINALYSIS DIP STICK AUTO W/O MICRO      CULTURE, URINE   3.  Anxiety F41.9 300.00 busPIRone (BUSPAR) 30 mg tablet   UA negative, will send for culture. Med rec updated. Back exercises given, she will call for any worsening. Follow-up Disposition:  Return if symptoms worsen or fail to improve. Advised her to call back or return to office if symptoms worsen/change/persist.  Discussed expected course/resolution/complications of diagnosis in detail with patient. Medication risks/benefits/costs/interactions/alternatives discussed with patient. She was given an after visit summary which includes diagnoses, current medications, & vitals. She expressed understanding with the diagnosis and plan.

## 2017-08-17 NOTE — PROGRESS NOTES
Chief Complaint   Patient presents with    LOW BACK PAIN     Low back pain since Sunday with some burning.  Used PRN Macrobid and was relieved of the pain     Results for orders placed or performed in visit on 08/17/17   AMB POC URINALYSIS DIP STICK AUTO W/O MICRO     Status: None   Result Value Ref Range Status    Color (UA POC) Dark Yellow  Final    Clarity (UA POC) Clear  Final    Glucose (UA POC) Negative Negative Final    Bilirubin (UA POC) Negative Negative Final    Ketones (UA POC) Negative Negative Final    Specific gravity (UA POC) 1.020 1.001 - 1.035 Final    Blood (UA POC) Negative Negative Final    pH (UA POC) 5.0 4.6 - 8.0 Final    Protein (UA POC) Negative Negative mg/dL Final    Urobilinogen (UA POC) 0.2 mg/dL 0.2 - 1 Final    Nitrites (UA POC) Negative Negative Final    Leukocyte esterase (UA POC) Negative Negative Final

## 2017-08-17 NOTE — MR AVS SNAPSHOT
Visit Information Date & Time Provider Department Dept. Phone Encounter #  
 2017  8:30 AM Naga Bellamy NP [address] [facility] [place] [phone] [id number] Follow-up Instructions Return if symptoms worsen or fail to improve. Your Appointments 2018  8:45 AM  
ROUTINE CARE with FRANCOIS Garcia [facility] ([facility]) Appt Note: anxiety follow up $0CP 2017 VISHNU  
 Prsicilla Harris Upcoming Health Maintenance Date Due INFLUENZA AGE 9 TO ADULT 2017 PAP AKA CERVICAL CYTOLOGY 6/15/2018 DTaP/Tdap/Td series (2 - Td) 2023 Allergies as of 2017  Review Complete On: 2017 By: Naga Bellamy NP No Known Allergies Current Immunizations  Reviewed on 2016 Name Date Influenza Vaccine (Quad) PF 2016, 2015 Tdap 2013 Not reviewed this visit You Were Diagnosed With   
  
 Codes Comments Acute low back pain without sciatica, unspecified back pain laterality    -  Primary ICD-10-CM: M54.5 ICD-9-CM: 724.2 Dysuria     ICD-10-CM: R30.0 ICD-9-CM: 711. 1 Vitals BP Pulse Temp Resp Height(growth percentile) Weight(growth percentile) 131/79 87 99.6 °F (37.6 °C) (Oral) 16 5' 4\" (1.626 m) 196 lb 6.4 oz (89.1 kg) LMP SpO2 BMI OB Status Smoking Status 2017 (Approximate) 96% 33.71 kg/m2 Having regular periods Former Smoker Vitals History BMI and BSA Data Body Mass Index Body Surface Area 33.71 kg/m 2 2.01 m 2 Preferred Pharmacy Pharmacy Name Phone [facility] [address] [place] [address] 78 Your Updated Medication List  
  
   
This list is accurate as of: 17  8:54 AM.  Always use your most recent med list.  
  
  
  
  
 ALPRAZolam 0.25 mg tablet Commonly known as:  XANAX  
TAKE ONE TABLET BY MOUTH TWICE DAILY AS NEEDED FOR ANXIETY *MAX  DAILY  AMOUNT  IS  2  TABS*  Indications: anxiety Start taking on:  8/19/2017 AZO PO Take  by mouth as needed. busPIRone 30 mg tablet Commonly known as:  BUSPAR  
  
 cholecalciferol (VITAMIN D3) 5,000 unit Tab tablet Commonly known as:  VITAMIN D3 Take  by mouth daily. fexofenadine-pseudoephedrine  mg Tb12 Commonly known as:  ALLEGRA-D Take 1 Tab by mouth every twelve (12) hours. multivitamin tablet Commonly known as:  ONE A DAY Take 1 Tab by mouth daily. nitrofurantoin (macrocrystal-monohydrate) 100 mg capsule Commonly known as:  MACROBID Take 1 Cap by mouth once as needed. Use as needed after intercourse. QNASL 80 mcg/actuation [facility] Generic drug:  beclomethasone dipropionate  
  
 traZODone 100 mg tablet Commonly known as:  DESYREL  
TAKE ONE TABLET BY MOUTH ONCE DAILY AT  NIGHT We Performed the Following AMB POC URINALYSIS DIP STICK AUTO W/O MICRO [85577 CPT(R)] CULTURE, URINE [id number] CPT(R)] Follow-up Instructions Return if symptoms worsen or fail to improve. To-Do List   
 08/22/2017 6:00 PM  
  Appointment with Javier Winchester at [facility] ([phone])  
  
 08/24/2017 6:00 PM  
  Appointment with Javier Winchester at [facility] ([phone])  
  
 08/29/2017 6:00 PM  
  Appointment with Javier Winchester at [facility] ([phone])  
  
 08/31/2017 6:00 PM  
  Appointment with Javier Winchester at [facility] ([phone]) Patient Instructions Back Stretches: Exercises Your Care Instructions Here are some examples of exercises for stretching your back. Start each exercise slowly. Ease off the exercise if you start to have pain. Your doctor or physical therapist will tell you when you can start these exercises and which ones will work best for you. How to do the exercises Overhead stretch 1. Stand comfortably with your feet shoulder-width apart. 2. Looking straight ahead, raise both arms over your head and reach toward the ceiling. Do not allow your head to tilt back. 3. Hold for 15 to 30 seconds, then lower your arms to your sides. 4. Repeat 2 to 4 times. Side stretch 1. Stand comfortably with your feet shoulder-width apart. 2. Raise one arm over your head, and then lean to the other side. 3. Slide your hand down your leg as you let the weight of your arm gently stretch your side muscles. Hold for 15 to 30 seconds. 4. Repeat 2 to 4 times on each side. Press-up 1. Lie on your stomach, supporting your body with your forearms. 2. Press your elbows down into the floor to raise your upper back. As you do this, relax your stomach muscles and allow your back to arch without using your back muscles. As your press up, do not let your hips or pelvis come off the floor. 3. Hold for 15 to 30 seconds, then relax. 4. Repeat 2 to 4 times. Relax and rest 
 
1. Lie on your back with a rolled towel under your neck and a pillow under your knees. Extend your arms comfortably to your sides. 2. Relax and breathe normally. 3. Remain in this position for about 10 minutes. 4. If you can, do this 2 or 3 times each day. Follow-up care is a key part of your treatment and safety. Be sure to make and go to all appointments, and call your doctor if you are having problems. It's also a good idea to know your test results and keep a list of the medicines you take. Where can you learn more? Go to http://awilda-nicho.info/. Enter J276 in the search box to learn more about \"Back Stretches: Exercises. \" Current as of: March 21, 2017 Content Version: 11.3 © 0546-5899 [employer].  Care instructions adapted under license by [employer] (which disclaims liability or warranty for this information). If you have questions about a medical condition or this instruction, always ask your healthcare professional. [address] any warranty or liability for your use of this information. Introducing [facility] HE[facility]! Dear Mag Kaur: 
Thank you for requesting a Arrowsight account. Our records indicate that you already have an active Arrowsight account. You can access your account anytime at [email or web address] Did you know that you can access your hospital and ER discharge instructions at any time in Arrowsight? You can also review all of your test results from your hospital stay or ER visit. Additional Information If you have questions, please visit the Frequently Asked Questions section of the Arrowsight website at [email or web address] Remember, Arrowsight is NOT to be used for urgent needs. For medical emergencies, dial 911. Now available from your iPhone and Android! Please provide this summary of care documentation to your next provider. Your primary care clinician is listed as Maru Wells. If you have any questions after today's visit, please call [phone].

## 2017-08-17 NOTE — PATIENT INSTRUCTIONS

## 2017-08-18 LAB — BACTERIA UR CULT: NO GROWTH

## 2017-08-22 ENCOUNTER — HOSPITAL ENCOUNTER (OUTPATIENT)
Dept: PHYSICAL THERAPY | Age: 42
Discharge: HOME | End: 2017-08-22
Payer: COMMERCIAL

## 2017-08-22 PROCEDURE — 97110 THERAPEUTIC EXERCISES: CPT

## 2017-08-22 PROCEDURE — 97112 NEUROMUSCULAR REEDUCATION: CPT

## 2017-08-22 NOTE — PROGRESS NOTES
PT DAILY TREATMENT NOTE - [facility] 2-15    Patient Name: Arturo Batch  Date:2017  : 1975  [x]  Patient  Verified  Payor: Sa[place]e [place] Alvarez / Plan: [place] [employer] / Product Type: HMO /    In time: 6:00P Out time: 7:00P  Total Treatment Time (min): 60  Total Timed Codes (min): 60  1:1 Treatment Time ( only): --   Visit #: 12    Treatment Area: Right ankle pain [M25.571]    SUBJECTIVE  Pain Level (0-10 scale): 0/10  Any medication changes, allergies to medications, adverse drug reactions, diagnosis change, or new procedure performed?: [x] No    [] Yes (see summary sheet for update)  Subjective functional status/changes:   [] No changes reported  \"I have noticed that [place] I will have increase fatigue in my knees when we come back home when navigating stairs. We do not have stairs at the house we stay at in the [place] [address]. I also have noticed that I feel stiff all over since I have stopped doing yoga as it was a privilege I had being a [address]. \"    OBJECTIVE     Modality rationale: decrease edema, decrease inflammation and decrease pain to improve the patients ability to decrease post therapy soreness    Min Type Additional Details    [] Estim: []Att   []Unatt        []TENS instruct                  []IFC  []Premod   []NMES                     []Other:  []w/US   []w/ice   []w/heat  Position:  Location:    []  Traction: [] Cervical       []Lumbar                       [] Prone          []Supine                       []Intermittent   []Continuous Lbs:  [] before manual  [] after manual  []w/heat    []  Ultrasound: []Continuous   [] Pulsed at:                           []1MHz   []3MHz Location:  W/cm2:    [] Paraffin         Location:   []w/heat   To go [x]  Ice     []  Heat  []  Ice massage Position:  Location:     []  Laser  []  Other: Position:  Location:      []  Vasopneumatic Device Pressure:       [] lo [] med [] hi   Temperature:      [x] Skin assessment post-treatment:  [x]intact []redness- no adverse reaction    []redness - adverse reaction:     50 min Therapeutic Exercise:  [x] See flow sheet :   Rationale: increase ROM, increase strength and improve coordination to improve the patients ability to increase tolerance to daily and gym related activities    10 min Neuromuscular Re-education:  [x]  See flow sheet :   Rationale: increase strength, improve coordination, improve balance and increase proprioception  to improve the patients ability to increase R LE stability and quad strength           With   [] TE   [] TA   [] neuro   [] other: Patient Education: [x] Review HEP    [] Progressed/Changed HEP based on:   [] positioning   [] body mechanics   [] transfers   [] heat/ice application    [] other:      Other Objective/Functional Measures: FOTO 73/100    Pain Level (0-10 scale) post treatment: 0/10    ASSESSMENT/Changes in Function:   Pt reports that she feels 70% better. Added core ball reach today. Patient will continue to benefit from skilled PT services to modify and progress therapeutic interventions, address functional mobility deficits, address ROM deficits, address strength deficits, analyze and address soft tissue restrictions, analyze and cue movement patterns and analyze and modify body mechanics/ergonomics to attain remaining goals. [x]  See Plan of Care  []  See progress note/recertification  []  See Discharge Summary         Progress towards goals / Updated goals:  Long Term Goals: To be accomplished in 2-4 weeks:  1) Pt will be able to squat without pain. MET  2) Pt will be able to navigate uneven terrain without ankle pain or instability. MET  3) Pt will be able to resume gym work out routine without pain.   Partially MET    PLAN  [x]  Upgrade activities as tolerated     [x]  Continue plan of care  []  Update interventions per flow sheet       []  Discharge due to:_  []  Other:_      Cirilo Nation, PTA 8/22/2017  11:10 AM

## 2017-08-24 ENCOUNTER — APPOINTMENT (OUTPATIENT)
Dept: PHYSICAL THERAPY | Age: 42
End: 2017-08-24
Payer: COMMERCIAL

## 2017-08-29 ENCOUNTER — HOSPITAL ENCOUNTER (OUTPATIENT)
Dept: PHYSICAL THERAPY | Age: 42
Discharge: HOME | End: 2017-08-29
Payer: COMMERCIAL

## 2017-08-29 PROCEDURE — 97112 NEUROMUSCULAR REEDUCATION: CPT

## 2017-08-29 PROCEDURE — 97110 THERAPEUTIC EXERCISES: CPT

## 2017-08-29 NOTE — PROGRESS NOTES
PT DAILY TREATMENT NOTE - [facility] 2-15    Patient Name: Dillard Aase  Date:2017  : 1975  [x]  Patient  Verified  Payor: Diane Beal / Plan: Amalia Pair / Product Type: HMO /    In time: 6:05P Out time: 7:00  Total Treatment Time (min): 55  Total Timed Codes (min): 55  1:1 Treatment Time ([address] only): --   Visit #: 13    Treatment Area: Right ankle pain [M25.571]    SUBJECTIVE  Pain Level (0-10 scale): 0/10  Any medication changes, allergies to medications, adverse drug reactions, diagnosis change, or new procedure performed?: [x] No    [] Yes (see summary sheet for update)  Subjective functional status/changes:   [] No changes reported  \"I went to yoga on Monday. I did well. I had no pain during or after class. \"    OBJECTIVE     Modality rationale: decrease edema, decrease inflammation and decrease pain to improve the patients ability to decrease post therapy soreness    Min Type Additional Details    [] Estim: []Att   []Unatt        []TENS instruct                  []IFC  []Premod   []NMES                     []Other:  []w/US   []w/ice   []w/heat  Position:  Location:    []  Traction: [] Cervical       []Lumbar                       [] Prone          []Supine                       []Intermittent   []Continuous Lbs:  [] before manual  [] after manual  []w/heat    []  Ultrasound: []Continuous   [] Pulsed at:                           []1MHz   []3MHz Location:  W/cm2:    [] Paraffin         Location:   []w/heat   To go [x]  Ice     []  Heat  []  Ice massage Position:  Location:     []  Laser  []  Other: Position:  Location:      []  Vasopneumatic Device Pressure:       [] lo [] med [] hi   Temperature:      [x] Skin assessment post-treatment:  [x]intact []redness- no adverse reaction    []redness - adverse reaction:     45  Therapeutic Exercise:  [x] See flow sheet :   Rationale: increase ROM, increase strength and improve coordination to improve the patients ability to increase tolerance to daily and gym related activities    10 min Neuromuscular Re-education:  [x]  See flow sheet :   Rationale: increase strength, improve coordination, improve balance and increase proprioception  to improve the patients ability to increase R LE stability and quad strength           With   [] TE   [] TA   [] neuro   [] other: Patient Education: [x] Review HEP    [] Progressed/Changed HEP based on:   [] positioning   [] body mechanics   [] transfers   [] heat/ice application    [] other:      Other Objective/Functional Measures: --    Pain Level (0-10 scale) post treatment: 0/10    ASSESSMENT/Changes in Function:   Pt reported she still gets some increase in pain going down stairs sometimes. She reports that it is getting better. Pt's gym classes are offered Tues/Thur night. Pt will go to class this Thursday to begin returning to gym routine. Pt will FU with PT next week at [address] [place] [address] for reassessment and establish of plan moving forward. Patient will continue to benefit from skilled PT services to modify and progress therapeutic interventions, address functional mobility deficits, address ROM deficits, address strength deficits, analyze and address soft tissue restrictions, analyze and cue movement patterns and analyze and modify body mechanics/ergonomics to attain remaining goals. [x]  See Plan of Care  []  See progress note/recertification  []  See Discharge Summary         Progress towards goals / Updated goals:  Long Term Goals: To be accomplished in 2-4 weeks:  1) Pt will be able to squat without pain. MET  2) Pt will be able to navigate uneven terrain without ankle pain or instability. MET  3) Pt will be able to resume gym work out routine without pain.   Partially MET    PLAN  [x]  Upgrade activities as tolerated     [x]  Continue plan of care  []  Update interventions per flow sheet       []  Discharge due to:_  []  Other:_      Michaela Marshall, PTA 8/29/2017  11:10 AM

## 2017-08-31 ENCOUNTER — APPOINTMENT (OUTPATIENT)
Dept: PHYSICAL THERAPY | Age: 42
End: 2017-08-31
Payer: COMMERCIAL

## 2017-09-11 ENCOUNTER — APPOINTMENT (OUTPATIENT)
Dept: PHYSICAL THERAPY | Age: 42
End: 2017-09-11
Payer: COMMERCIAL

## 2017-09-12 ENCOUNTER — APPOINTMENT (OUTPATIENT)
Dept: PHYSICAL THERAPY | Age: 42
End: 2017-09-12
Payer: COMMERCIAL

## 2017-09-13 ENCOUNTER — HOSPITAL ENCOUNTER (OUTPATIENT)
Dept: PHYSICAL THERAPY | Age: 42
Discharge: HOME | End: 2017-09-13
Payer: COMMERCIAL

## 2017-09-13 PROCEDURE — 97110 THERAPEUTIC EXERCISES: CPT

## 2017-09-13 PROCEDURE — 97112 NEUROMUSCULAR REEDUCATION: CPT

## 2017-09-13 NOTE — PROGRESS NOTES
PT DAILY TREATMENT NOTE - [facility] 2-15    Patient Name: Dea Harris  Date:2017  : 1975  [x]  Patient  Verified  Payor: Cara Rodriguez / Plan: Madie Covarrubias / Product Type: HMO /    In time:5:30P  Out time: 6:30P  Total Treatment Time (min):60  Total Timed Codes (min): 60  1:1 Treatment Time ( [address] only): --   Visit #: 14     Treatment Area: Right ankle pain [M25.571]    SUBJECTIVE  Pain Level (0-10 scale): 0/10  Any medication changes, allergies to medications, adverse drug reactions, diagnosis change, or new procedure performed?: [x] No    [] Yes (see summary sheet for update)  Subjective functional status/changes:   [] No changes reported  \"I have not been able to go to the gym.  I have had things come up but I also feel anxious about that first time back in.\"    OBJECTIVE    Modality rationale: decrease pain to improve the patients ability to squatting   Min Type Additional Details    [] Estim: []Att   []Unatt        []TENS instruct                  []IFC  []Premod   []NMES                     []Other:  []w/US   []w/ice   []w/heat  Position:  Location:    []  Traction: [] Cervical       []Lumbar                       [] Prone          []Supine                       []Intermittent   []Continuous Lbs:  [] before manual  [] after manual  []w/heat    []  Ultrasound: []Continuous   [] Pulsed at:                           []1MHz   []3MHz Location:  W/cm2:    [] Paraffin         Location:   []w/heat   To go [x]  Ice     []  Heat  []  Ice massage Position:  Location: knee    []  Laser  []  Other: Position:  Location:      []  Vasopneumatic Device Pressure:       [] lo [] med [] hi   Temperature:      [x] Skin assessment post-treatment:  [x]intact []redness- no adverse reaction    []redness  adverse reaction:     50 min Therapeutic Exercise:  [x] See flow sheet :   Rationale: increase ROM, increase strength, improve coordination, improve balance and increase proprioception to improve the patients ability to squatting    10 min Neuromuscular Re-education:  [x]  See flow sheet :   Rationale: increase ROM, increase strength, improve coordination, improve balance and increase proprioception  to improve the patients ability to squatting    With   [x] TE   [] TA   [] neuro   [] other: Patient Education: [x] Review HEP    [] Progressed/Changed HEP based on:   [] positioning   [] body mechanics   [] transfers   [] heat/ice application    [] other:      Other Objective/Functional Measures: --     Pain Level (0-10 scale) post treatment: 0/10    ASSESSMENT/Changes in Function:   Pt given stretches to do on daily basis once she has concluded with HEP/workout at the gym. Pt stated she has more interest in getting in the pool or doing yoga at the gym. Patient will continue to benefit from skilled PT services to modify and progress therapeutic interventions, address functional mobility deficits, address ROM deficits, address strength deficits, analyze and address soft tissue restrictions, analyze and cue movement patterns, analyze and modify body mechanics/ergonomics and assess and modify postural abnormalities to attain remaining goals. [x]  See Plan of Care  []  See progress note/recertification  []  See Discharge Summary         Progress towards goals / Updated goals:  Long Term Goals: To be accomplished in 2-4 weeks:  1) Pt will be able to squat without pain. MET  2) Pt will be able to navigate uneven terrain without ankle pain or instability. MET  3) Pt will be able to resume gym work out routine without pain.   Partially MET     PLAN  [x]  Upgrade activities as tolerated     [x]  Continue plan of care  []  Update interventions per flow sheet       []  Discharge due to:_  []  Other:_      Olivia Rodriguez PTA 9/13/2017  6:04 PM

## 2017-09-14 NOTE — PROGRESS NOTES
PT DAILY TREATMENT NOTE - [facility] 2-15    Patient Name: Delmer Galan  Date:2017  : 1975  [x]  Patient  Verified  Payor: Gela Gamez / Plan: Real Colla / Product Type: HMO /    In time:5:30P  Out time:6:30P  Total Treatment Time (min): 60  Total Timed Codes (min): 60  1:1 Treatment Time ( only): --   Visit #: 14     Treatment Area: Right ankle pain [M25.571]    SUBJECTIVE  Pain Level (0-10 scale): 0/10  Any medication changes, allergies to medications, adverse drug reactions, diagnosis change, or new procedure performed?: [x] No    [] Yes (see summary sheet for update)  Subjective functional status/changes:   [] No changes reported  \"I was sore for 3 days after my last visit. I have not been able to go to the gym.  I have had things come up but I also feel anxious about that first time back in.\"    OBJECTIVE    Modality rationale: decrease edema, decrease inflammation and decrease pain to improve the patients ability to decrease post therapy soreness   Min Type Additional Details    [] Estim: []Att   []Unatt        []TENS instruct                  []IFC  []Premod   []NMES                     []Other:  []w/US   []w/ice   []w/heat  Position:  Location:    []  Traction: [] Cervical       []Lumbar                       [] Prone          []Supine                       []Intermittent   []Continuous Lbs:  [] before manual  [] after manual  []w/heat    []  Ultrasound: []Continuous   [] Pulsed at:                           []1MHz   []3MHz Location:  W/cm2:    [] Paraffin         Location:   []w/heat   To go [x]  Ice     []  Heat  []  Ice massage Position:  Location:    []  Laser  []  Other: Position:  Location:      []  Vasopneumatic Device Pressure:       [] lo [] med [] hi   Temperature:      [x] Skin assessment post-treatment:  [x]intact []redness- no adverse reaction    []redness - adverse reaction:     50 min Therapeutic Exercise:  [x] See flow sheet :   Rationale: increase ROM, increase strength, improve coordination and improve balance to improve the patients ability to perform squats    10 min Neuromuscular Re-education:  [x]  See flow sheet :   Rationale: increase ROM, increase strength, improve coordination and improve balance  to improve the patients ability to perform squats          With   [] TE   [] TA   [] neuro   [] other: Patient Education: [x] Review HEP    [] Progressed/Changed HEP based on:   [] positioning   [] body mechanics   [] transfers   [] heat/ice application    [] other:      Other Objective/Functional Measures: --     Pain Level (0-10 scale) post treatment: 0/10    ASSESSMENT/Changes in Function:   Pt given stretches to do on daily basis once she has concluded with HEP/workout at the gym. Pt stated she has more interest in getting in the pool or doing yoga at the gym. Patient will continue to benefit from skilled PT services to modify and progress therapeutic interventions, address functional mobility deficits, address ROM deficits, address strength deficits, analyze and address soft tissue restrictions, analyze and cue movement patterns, analyze and modify body mechanics/ergonomics and assess and modify postural abnormalities to attain remaining goals. [x]  See Plan of Care  []  See progress note/recertification  []  See Discharge Summary         Progress towards goals / Updated goals:  Long Term Goals: To be accomplished in 2-4 weeks:  1) Pt will be able to squat without pain.  MET  2) Pt will be able to navigate uneven terrain without ankle pain or instability.   MET  3) Pt will be able to resume gym work out routine without pain.  Partially MET    PLAN  [x]  Upgrade activities as tolerated     [x]  Continue plan of care  []  Update interventions per flow sheet       []  Discharge due to:_  []  Other:_      Olivia Rodriguez, YRIS 9/14/2017  8:28 AM

## 2017-09-22 ENCOUNTER — HOSPITAL ENCOUNTER (OUTPATIENT)
Dept: PHYSICAL THERAPY | Age: 42
Discharge: HOME | End: 2017-09-22
Payer: COMMERCIAL

## 2017-09-22 PROCEDURE — 97110 THERAPEUTIC EXERCISES: CPT

## 2017-09-22 PROCEDURE — 97112 NEUROMUSCULAR REEDUCATION: CPT

## 2017-09-22 NOTE — PROGRESS NOTES
PT DAILY TREATMENT NOTE - [facility] 2-15    Patient Name: Erika Oliver  Date:2017  : 1975  [x]  Patient  Verified  Payor: Earnestine Conception / Plan: Clement Walter / Product Type: HMO /    In time: 8:00A  Out time: 9:05  Total Treatment Time (min): 65  Total Timed Codes (min): 65  1:1 Treatment Time ( [address] only): --   Visit #: 16     Treatment Area: Right ankle pain [M25.571]    SUBJECTIVE  Pain Level (0-10 scale): 0/10  Any medication changes, allergies to medications, adverse drug reactions, diagnosis change, or new procedure performed?: [x] No    [] Yes (see summary sheet for update)  Subjective functional status/changes:   [] No changes reported  \"I went to Yoga last week. It was very basic and I started to feel discouraged because I use dot be able to do so much more. But I know that it will come. I feel like I am about 80-95% better. I do need to make an appointment to FU with Kandy Yap. I am traveling next week so I will make one for the week after. \"    OBJECTIVE    Modality rationale: decrease pain to improve the patients ability to squatting   Min Type Additional Details    [] Estim: []Att   []Unatt        []TENS instruct                  []IFC  []Premod   []NMES                     []Other:  []w/US   []w/ice   []w/heat  Position:  Location:    []  Traction: [] Cervical       []Lumbar                       [] Prone          []Supine                       []Intermittent   []Continuous Lbs:  [] before manual  [] after manual  []w/heat    []  Ultrasound: []Continuous   [] Pulsed at:                           []1MHz   []3MHz Location:  W/cm2:    [] Paraffin         Location:   []w/heat   To go [x]  Ice     []  Heat  []  Ice massage Position:  Location: knee    []  Laser  []  Other: Position:  Location:      []  Vasopneumatic Device Pressure:       [] lo [] med [] hi   Temperature:      [x] Skin assessment post-treatment:  [x]intact []redness- no adverse reaction    []redness - adverse reaction:     50 min Therapeutic Exercise:  [x] See flow sheet :   Rationale: increase ROM, increase strength, improve coordination, improve balance and increase proprioception to improve the patients ability to squatting    15 min Neuromuscular Re-education:  [x]  See flow sheet :   Rationale: increase ROM, increase strength, improve coordination, improve balance and increase proprioception  to improve the patients ability to squatting    With   [x] TE   [] TA   [] neuro   [] other: Patient Education: [x] Review HEP    [] Progressed/Changed HEP based on:   [] positioning   [] body mechanics   [] transfers   [] heat/ice application    [] other:      Other Objective/Functional Measures: --     Pain Level (0-10 scale) post treatment: 0/10    ASSESSMENT/Changes in Function:   [place] walking. Pt advised to FU with PT before FU with MD for reassessment. Patient will continue to benefit from skilled PT services to modify and progress therapeutic interventions, address functional mobility deficits, address ROM deficits, address strength deficits, analyze and address soft tissue restrictions, analyze and cue movement patterns, analyze and modify body mechanics/ergonomics and assess and modify postural abnormalities to attain remaining goals. [x]  See Plan of Care  []  See progress note/recertification  []  See Discharge Summary         Progress towards goals / Updated goals:  Long Term Goals: To be accomplished in 2-4 weeks:  1) Pt will be able to squat without pain. MET  2) Pt will be able to navigate uneven terrain without ankle pain or instability. MET  3) Pt will be able to resume gym work out routine without pain.   Partially MET     PLAN  [x]  Upgrade activities as tolerated     [x]  Continue plan of care  []  Update interventions per flow sheet       []  Discharge due to:_  []  Other:_      Tito Berger, YRIS 9/22/2017  6:04 PM

## 2017-10-02 ENCOUNTER — OFFICE VISIT (OUTPATIENT)
Dept: INTERNAL MEDICINE CLINIC | Facility: CLINIC | Age: 42
End: 2017-10-02

## 2017-10-02 VITALS
HEIGHT: 64 IN | HEART RATE: 74 BPM | SYSTOLIC BLOOD PRESSURE: 136 MMHG | BODY MASS INDEX: 34.56 KG/M2 | RESPIRATION RATE: 18 BRPM | OXYGEN SATURATION: 96 % | TEMPERATURE: 98.4 F | WEIGHT: 202.4 LBS | DIASTOLIC BLOOD PRESSURE: 86 MMHG

## 2017-10-02 DIAGNOSIS — R53.83 FATIGUE, UNSPECIFIED TYPE: Primary | ICD-10-CM

## 2017-10-02 DIAGNOSIS — E78.00 HYPERCHOLESTEREMIA: ICD-10-CM

## 2017-10-02 DIAGNOSIS — Z12.39 SCREENING FOR BREAST CANCER: ICD-10-CM

## 2017-10-02 DIAGNOSIS — Z23 ENCOUNTER FOR IMMUNIZATION: ICD-10-CM

## 2017-10-02 RX ORDER — MINERAL OIL
ENEMA (ML) RECTAL
COMMUNITY
End: 2017-12-12 | Stop reason: ALTCHOICE

## 2017-10-02 NOTE — PROGRESS NOTES
Room 8    Chief Complaint   Patient presents with   GUILLAUME SILVA [facility]     Patient states she feels like she is retaining Fluids. Hand and feet feels \"stiff\"     1. Have you been to the ER, urgent care clinic since your last visit? Hospitalized since your last visit? No    2. Have you seen or consulted any other health care providers outside of the [facility] since your last visit? Include any pap smears or colon screening.  No     Health Maintenance Due   Topic Date Due    INFLUENZA AGE 9 TO ADULT  08/01/2017

## 2017-10-02 NOTE — PROGRESS NOTES
HISTORY OF PRESENT ILLNESS  Octavia Farr is a 43 y.o. female. HPI  1) Legs and feet feeling swollen. Legs/Hands feel swollen. Not as active as previously. Now doing yoga 1x/week. Previously 4x/week. Planning to restart exercise. Started a new job in August - easy transition, but hours are not consistent. Wt Readings from Last 3 Encounters:   10/02/17 202 lb 6.4 oz (91.8 kg)   08/17/17 196 lb 6.4 oz (89.1 kg)   07/19/17 195 lb (88.5 kg)     Feeling irritable, but pt notes that this is probably because of lack of exercise. Feeling winded easily. ? allergy related. Allergy shots have been less frequent lately. 2) Hx hyperchol - would like lab order for fasting labs. Review of Systems   Respiratory: Negative for shortness of breath. Cardiovascular: Negative for chest pain and palpitations. Neurological: Negative for dizziness, loss of consciousness and weakness. Physical Exam   Constitutional: She is oriented to person, place, and time. She appears well-developed and well-nourished. No distress. HENT:   Head: Normocephalic and atraumatic. Neck: Neck supple. No JVD present. Cardiovascular: Normal rate, regular rhythm and normal heart sounds. Pulmonary/Chest: Effort normal and breath sounds normal. No respiratory distress. Musculoskeletal: She exhibits no edema. Neurological: She is alert and oriented to person, place, and time. Skin: Skin is warm and dry. Psychiatric: She has a normal mood and affect. Her behavior is normal. Judgment and thought content normal.   Nursing note and vitals reviewed. ASSESSMENT and PLAN    ICD-10-CM ICD-9-CM    1. Fatigue, unspecified type R53.83 780.79 HEMOGLOBIN A1C W/O EAG      CBC WITH AUTOMATED DIFF   2. Screening for breast cancer Z12.31 V76.10 RUY MAMMO BI SCREENING INCL CAD   3. Encounter for immunization Z23 V03.89 INFLUENZA VIRUS VAC QUAD,SPLIT,PRESV FREE SYRINGE IM   4.  Hypercholesteremia E78.00 272.0 HEMOGLOBIN A1C W/O EAG      METABOLIC PANEL, COMPREHENSIVE      LIPID PANEL      TSH REFLEX TO T4

## 2017-10-03 DIAGNOSIS — E78.00 HYPERCHOLESTEREMIA: ICD-10-CM

## 2017-10-03 DIAGNOSIS — R53.83 FATIGUE, UNSPECIFIED TYPE: ICD-10-CM

## 2017-10-04 ENCOUNTER — HOSPITAL ENCOUNTER (OUTPATIENT)
Dept: PHYSICAL THERAPY | Age: 42
Discharge: HOME | End: 2017-10-04
Payer: COMMERCIAL

## 2017-10-04 PROCEDURE — 97110 THERAPEUTIC EXERCISES: CPT

## 2017-10-04 PROCEDURE — 97112 NEUROMUSCULAR REEDUCATION: CPT

## 2017-10-04 NOTE — PROGRESS NOTES
PT DAILY TREATMENT NOTE - [facility] 2-15    Patient Name: Samreen Dunham  YNGP:[id number]  : 1975  [x]  Patient  Verified  Payor: Kristy Kolb / Plan: Esme Greenfield / Product Type: HMO /    In time: 5:40P  Out time: 6:45P  Total Treatment Time (min): 65  Total Timed Codes (min): 65  1:1 Treatment Time ( [address] only): --   Visit #: 17     Treatment Area: Right ankle pain [M25.571]    SUBJECTIVE  Pain Level (0-10 scale): 0/10  Any medication changes, allergies to medications, adverse drug reactions, diagnosis change, or new procedure performed?: [x] No    [] Yes (see summary sheet for update)  Subjective functional status/changes:   [] No changes reported  Pt reported she did well when she was in [facility]. She reported she did a lot of walking around. She stated she was tired and sore but overall felt good. Pt feels she will be able to continue on her own moving forward but will be FU with Dr Christopher Coronado 10/16/17. OBJECTIVE    50 min Therapeutic Exercise:  [x] See flow sheet :   Rationale: increase ROM, increase strength, improve coordination, improve balance and increase proprioception to improve the patients ability to squatting    15 min Neuromuscular Re-education:  [x]  See flow sheet :   Rationale: increase ROM, increase strength, improve coordination, improve balance and increase proprioception  to improve the patients ability to squatting    With   [x] TE   [] TA   [] neuro   [] other: Patient Education: [x] Review HEP    [] Progressed/Changed HEP based on:   [] positioning   [] body mechanics   [] transfers   [] heat/ice application    [] other:      Other Objective/Functional Measures: FOTO: 72/100       Pain Level (0-10 scale) post treatment: 0/10    ASSESSMENT/Changes in Function:   Pt has been seen in PT for ankle and knee pain for 17 visits. Pt has responded well to strengthening and stabilization exercises. Pt has returned to working out in the gym with no c/o pain.  Pt reports feels 88% better and has met all goals set forth by the PT. Pt will FU with MD on 10/16/17. Recommending pt be d/d to home program at this time. Discussed plan with PT.         []  See Plan of Care  []  See progress note/recertification  [x]  See Discharge Summary         Progress towards goals / Updated goals:  Long Term Goals: To be accomplished in 2-4 weeks:  1) Pt will be able to squat without pain. MET  2) Pt will be able to navigate uneven terrain without ankle pain or instability. MET  3) Pt will be able to resume gym work out routine without pain.    MET     PLAN  []  Upgrade activities as tolerated     []  Continue plan of care  []  Update interventions per flow sheet       [x]  Discharge due to:_  []  Other:_      Angelique Ellington PTA 10/4/2017  6:04 PM

## 2017-10-05 DIAGNOSIS — E78.00 HYPERCHOLESTEREMIA: ICD-10-CM

## 2017-10-05 DIAGNOSIS — R53.83 FATIGUE, UNSPECIFIED TYPE: ICD-10-CM

## 2017-10-11 LAB
ALBUMIN SERPL-MCNC: 4.3 G/DL (ref 3.5–5.5)
ALBUMIN/GLOB SERPL: 1.7 {RATIO} (ref 1.2–2.2)
ALP SERPL-CCNC: 63 IU/L (ref 39–117)
ALT SERPL-CCNC: 36 IU/L (ref 0–32)
AST SERPL-CCNC: 19 IU/L (ref 0–40)
BILIRUB SERPL-MCNC: 0.3 MG/DL (ref 0–1.2)
BUN SERPL-MCNC: 11 MG/DL (ref 6–24)
BUN/CREAT SERPL: 13 (ref 9–23)
CALCIUM SERPL-MCNC: 9.7 MG/DL (ref 8.7–10.2)
CHLORIDE SERPL-SCNC: 105 MMOL/L (ref 96–106)
CHOLEST SERPL-MCNC: 175 MG/DL (ref 100–199)
CO2 SERPL-SCNC: 21 MMOL/L (ref 18–29)
CREAT SERPL-MCNC: 0.83 MG/DL (ref 0.57–1)
GLOBULIN SER CALC-MCNC: 2.5 G/DL (ref 1.5–4.5)
GLUCOSE SERPL-MCNC: 115 MG/DL (ref 65–99)
HBA1C MFR BLD: 5.3 % (ref 4.8–5.6)
HDLC SERPL-MCNC: 63 MG/DL
LDLC SERPL CALC-MCNC: 71 MG/DL (ref 0–99)
POTASSIUM SERPL-SCNC: 4.3 MMOL/L (ref 3.5–5.2)
PROT SERPL-MCNC: 6.8 G/DL (ref 6–8.5)
SODIUM SERPL-SCNC: 137 MMOL/L (ref 134–144)
TRIGL SERPL-MCNC: 204 MG/DL (ref 0–149)
TSH SERPL DL<=0.005 MIU/L-ACNC: 2.26 UIU/ML (ref 0.45–4.5)
VLDLC SERPL CALC-MCNC: 41 MG/DL (ref 5–40)

## 2017-10-20 ENCOUNTER — OFFICE VISIT (OUTPATIENT)
Dept: INTERNAL MEDICINE CLINIC | Age: 42
End: 2017-10-20

## 2017-10-20 VITALS
WEIGHT: 201.1 LBS | SYSTOLIC BLOOD PRESSURE: 136 MMHG | HEART RATE: 92 BPM | DIASTOLIC BLOOD PRESSURE: 98 MMHG | BODY MASS INDEX: 34.33 KG/M2 | OXYGEN SATURATION: 97 % | TEMPERATURE: 98.6 F | RESPIRATION RATE: 15 BRPM | HEIGHT: 64 IN

## 2017-10-20 DIAGNOSIS — M25.571 CHRONIC PAIN OF RIGHT ANKLE: ICD-10-CM

## 2017-10-20 DIAGNOSIS — G89.29 CHRONIC PAIN OF RIGHT ANKLE: ICD-10-CM

## 2017-10-20 DIAGNOSIS — M25.561 PATELLOFEMORAL JOINT PAIN, RIGHT: Primary | ICD-10-CM

## 2017-10-20 RX ORDER — NITROFURANTOIN 25; 75 MG/1; MG/1
CAPSULE ORAL AS NEEDED
COMMUNITY
Start: 2017-09-07 | End: 2018-04-05 | Stop reason: SDUPTHER

## 2017-10-20 NOTE — PROGRESS NOTES
Chief Complaint   Patient presents with    Ankle Pain    Knee Pain     she is a 43y.o. year old female who presents for follow up of injury. Follow Up Pain Assessment Encounter      Onset of Symptoms: Several Months  ________________________________________________________________________  Description: Pain has improved      Pain Scale:(1-10): 9  Duration:  continuous  What makes it better?: exercise  What makes it worse?:footwear, stairs  Medications tried: None  Modalities tried: PT        Reviewed and agree with Nurse Note and duplicated in this note. Reviewed PmHx, RxHx, FmHx, SocHx, AllgHx and updated and dated in the chart. Family History   Problem Relation Age of Onset    Diabetes Mother     Thyroid Disease Mother      hypothyroidism    Hypertension Father     High Cholesterol Father     Thyroid Disease Father      goiter, hyperthyroidism    Heart Attack Father      in 29's    Diabetes Maternal Grandmother     Diabetes Maternal Grandfather     Diabetes Paternal Grandmother     Diabetes Paternal Grandfather        Past Medical History:   Diagnosis Date    Anxiety     Depression     Nontoxic multinodular goiter     Followed with [facility] months starting 7/2015 0 Dr. Philip Ewing        Social History     Social History    Marital status: SINGLE     Spouse name: N/A    Number of children: 0    Years of education: N/A     Occupational History     - coaching other teachers as well      Social History Main Topics    Smoking status: Former Smoker     Packs/day: 1.50     Years: 24.00     Types: Cigarettes     Quit date: 8/8/2015    Smokeless tobacco: Never Used      Comment: Has tried hypnotherapy, ecig, Wellbutrin. Chantix worked!     Alcohol use 0.0 oz/week     0 Standard drinks or equivalent per week      Comment: 1-2 glass with dinner 3x/week, 2 glasses Friday/Saturday    Drug use: No    Sexual activity: Yes     Partners: Male     Birth control/ protection: Rhythm     Other Topics Concern    Exercise Yes     2x/week yoga & strength    Seat Belt Yes    Self-Exams No     Social History Narrative    Lives with one dog. Boyfriend - dating off and on since 2012 - steady since Sept 2015. Losing weight with ZG in [id number]! Review of Systems - negative except as listed above      Objective:     Vitals:    10/20/17 0830   BP: (!) 136/98   Pulse: 92   Resp: 15   Temp: 98.6 °F (37 °C)   TempSrc: Oral   SpO2: 97%   Weight: 201 lb 1.6 oz (91.2 kg)   Height: 5' 4\" (1.626 m)       Physical Examination: General appearance - alert, well appearing, and in no distress  Back exam - full range of motion, no tenderness, palpable spasm or pain on motion  Neurological - alert, oriented, normal speech, no focal findings or movement disorder noted  Musculoskeletal - right knee exam:  The patient'sright Knee  is  normal to inspection. The ROM is normal and there is flexion to 60 Effusion is: mild The joint exhibits  absent warmth and Crepitus is: mild. The Shankar test is:  Negative Joint Line Tenderness is  Negative . The [facility] Test is Negative   and the Lachman is  negative   . The Anterior Drawer is  Negative. The Posterior Drawer is:  negative. Valgus Stress (for MCL) is:  normal . Varus Stress (for LCL) is  normal  . The Mook Test is negative and the Apprehension Sign:   negative    A right ankle exam was performed.   Swelling: none  Tenderness: none  Strength: normal    Palpation:  ATFL:  non-tender  CFL:  non-tender  PTFL:  non-tender  Syndesmosis Ligament:  non-tender  Deltoid ligament:  non-tender  Posterior Tibialis Tendon:  non-tender  Peroneal Tendon: non-tender  Achilles Tendon:  non-tender     Proximal Fibula:  non-tender  Proximal Tibia:  non-tender  Distal Fibula:  non-tender  Distal Tibia:  non-tender    Plantar Fascia: non-tender  Midfoot:  non-tender  Forefoot:  non-tender    ROM:  Plantar Flexion:  normal  Dorsiflexion:  normal    Squeeze Test: negative  Talar Tilt Test:  negative  Anterior Drawer:negative    Kleiger Test:  negative  Hop Test: negative  [place]: negative      Extremities - peripheral pulses normal, no pedal edema, no clubbing or cyanosis  Skin - normal coloration and turgor, no rashes, no suspicious skin lesions noted    Assessment/ Plan:   Diagnoses and all orders for this visit:    1. Patellofemoral joint pain, right    2. Chronic pain of right ankle    Patient is doing much better and is going to continue home exercises and home therapy. .  She tolerated physical therapy very well will take anti-inflammatories as needed  Follow-up Disposition:  Return if symptoms worsen or fail to improve. Pathophysiology, recovery and rehabilitation process discussed and questions answered   Counseling for 30 Minutes of the total visit duration   Pictures and figures used as necessary   Provided reassurance   Monitor response to home exercises   Recommend  lower impact activities-walking, Eliptical, Nordic Track, cycling or swimming   Follow up in 4 week(s)      1) Remember to stay active and/or exercise regularly (I suggest 30-45 minutes daily)   2) For reliable dietary information, go to www. EATRIGHT.org. You may wish to consider seeing the nutritionist at [facility] [phone], also consider the [address] I have discussed the diagnosis with the patient and the intended plan as seen in the above orders. The patient has received an after-visit summary and questions were answered concerning future plans. Medication Side Effects and Warnings were discussed with patient: yes  Patient Labs were reviewed and or requested: yes  Patient Past Records were reviewed and or requested  yes  I have discussed the diagnosis with the patient and the intended plan as seen in the above orders. The patient has received an after-visit summary and questions were answered concerning future plans.      Pt agrees to call or return to clinic and/or go to closest ER with any worsening of symptoms. This may include, but not limited to increased fever (>100.4) with NSAIDS or Tylenol, increased edema, confusion, rash, worsening of presenting symptoms.

## 2017-10-20 NOTE — MR AVS SNAPSHOT
Visit Information Date & Time Provider Department Dept. Phone Encounter #  
 10/20/2017  8:15 AM [address] [place] [address] Alonzo Tapia [facility] Sports Medicine and [address] [id number] Follow-up Instructions Return if symptoms worsen or fail to improve. Upcoming Health Maintenance Date Due  
 PAP AKA CERVICAL CYTOLOGY 6/15/2018 DTaP/Tdap/Td series (2 - Td) 5/23/2023 Allergies as of 10/20/2017  Review Complete On: 10/20/2017 By: [facility] MD Willie  
 No Known Allergies Current Immunizations  Reviewed on 10/2/2017 Name Date Influenza Vaccine (Quad) PF 10/2/2017, 11/18/2016, 12/2/2015 Tdap 5/23/2013 Not reviewed this visit Vitals BP Pulse Temp Resp Height(growth percentile) Weight(growth percentile) (!) 136/98 (BP 1 Location: Left arm, BP Patient Position: Sitting) 92 98.6 °F (37 °C) (Oral) 15 5' 4\" (1.626 m) 201 lb 1.6 oz (91.2 kg) LMP SpO2 BMI OB Status Smoking Status 10/18/2017 (Exact Date) 97% 34.52 kg/m2 Having regular periods Former Smoker Vitals History BMI and BSA Data Body Mass Index Body Surface Area 34.52 kg/m 2 2.03 m 2 Preferred Pharmacy Pharmacy Name Phone [facility] [address] [place] [address] Your Updated Medication List  
  
   
This list is accurate as of: 10/20/17  8:40 AM.  Always use your most recent med list.  
  
  
  
  
 ALPRAZolam 0.25 mg tablet Commonly known as:  XANAX  
TAKE ONE TABLET BY MOUTH TWICE DAILY AS NEEDED FOR ANXIETY *MAX  DAILY  AMOUNT  IS  2  TABS*  Indications: anxiety  
  
 cholecalciferol (VITAMIN D3) 5,000 unit Tab tablet Commonly known as:  VITAMIN D3 Take  by mouth daily. fexofenadine 180 mg tablet Commonly known as:  Loly Gill Take  by mouth.  
  
 multivitamin tablet Commonly known as:  ONE A DAY Take 1 Tab by mouth daily. nitrofurantoin (macrocrystal-monohydrate) 100 mg capsule Commonly known as:  MACROBID  
as needed. QNASL 80 mcg/actuation [facility] Generic drug:  beclomethasone dipropionate  
  
 traZODone 100 mg tablet Commonly known as:  DESYREL  
TAKE ONE TABLET BY MOUTH ONCE DAILY AT  NIGHT Follow-up Instructions Return if symptoms worsen or fail to improve. To-Do List   
 10/30/2017 7:40 AM  
  Appointment with [facility]Y 3 at [address] [place] [address] ([phone]) Shower or bathe using soap and water. Do not use deodorant, powder, perfumes, or lotion the day of your exam.  If your prior mammograms were not performed at [facility] 6 please bring films with you or forward prior images 2 days before your procedure. Check in at registration 15min before your appointment time unless you were instructed to do otherwise. A script is not necessary, but if you have one, please bring it on the day of the mammogram or have it faxed to the department. [facility] [phone] [facility]  [phone] [facility]  [phone] [facility] [phone] [facility] [address] [place] [address] [phone] [facility]! Dear Arlene Manzo: 
Thank you for requesting a [employer] account. Our records indicate that you already have an active [employer] account. You can access your account anytime at [email or web address] Did you know that you can access your hospital and ER discharge instructions at any time in [employer]? You can also review all of your test results from your hospital stay or ER visit. Additional Information If you have questions, please visit the Frequently Asked Questions section of the [employer] website at [email or web address] Remember, [employer] is NOT to be used for urgent needs. For medical emergencies, dial 911. Now available from your iPhone and Android! Please provide this summary of care documentation to your next provider. Your primary care clinician is listed as Daniel Noel. If you have any questions after today's visit, please call [phone].

## 2017-10-30 ENCOUNTER — HOSPITAL ENCOUNTER (OUTPATIENT)
Dept: MAMMOGRAPHY | Age: 42
Discharge: HOME | End: 2017-10-30
Attending: PHYSICIAN ASSISTANT
Payer: COMMERCIAL

## 2017-10-30 DIAGNOSIS — Z12.39 SCREENING FOR BREAST CANCER: ICD-10-CM

## 2017-10-30 PROCEDURE — 77067 SCR MAMMO BI INCL CAD: CPT

## 2017-11-21 NOTE — ANCILLARY DISCHARGE INSTRUCTIONS
[facility]  [address] [address] [address]   Phone: [phone]  Fax: [phone]    Discharge Summary  2-15    Patient name: Duane Waters  : 1975  Provider#: [id number]  Referral source: Syed Monroy MD      Medical/Treatment Diagnosis: Right ankle pain [M25.571]     Prior Hospitalization: see medical history     Comorbidities: none  Prior Level of Function:complete 20 minutes of exercise at least 1-2 times a week  Medications: Verified on Patient Summary List    Start of Care: 2017      Onset Date:1 year ago   Visits from Start of Care: 17     Missed Visits: 0  Reporting Period : 17 to 10/4/17    Long Term Goals: To be accomplished in 2-4 weeks:  1) Pt will be able to squat without pain.  MET  2) Pt will be able to navigate uneven terrain without ankle pain or instability.  MET  3) Pt will be able to resume gym work out routine without pain.   MET      ASSESSMENT/SUMMARY OF CARE:  Pt has been seen in PT for R knee and ankle pain for 17 visits. Treatment has included therapeutic exercises and activities, neuromuscular reeducation, modalities, manual therapy, HEP, and Pt education. Pt reported feeling about 88% better on 10/4/17 visits. Pt was going to FU with MD 10/16 with recommendation for d/c to home maintenance. Pt is compliant with HEP and has returned to gym routine.  D/c  At this time    RECOMMENDATIONS:  [x]Discontinue therapy: [x]Patient has reached or is progressing toward set goals      []Patient is non-compliant or has abdicated      []Due to lack of appreciable progress towards set goals    Cordell Pierce, PT, DPT 2017 12:18 PM

## 2017-12-12 ENCOUNTER — OFFICE VISIT (OUTPATIENT)
Dept: INTERNAL MEDICINE CLINIC | Facility: CLINIC | Age: 42
End: 2017-12-12

## 2017-12-12 VITALS
SYSTOLIC BLOOD PRESSURE: 141 MMHG | BODY MASS INDEX: 34.83 KG/M2 | RESPIRATION RATE: 18 BRPM | TEMPERATURE: 98.6 F | HEIGHT: 64 IN | OXYGEN SATURATION: 98 % | HEART RATE: 82 BPM | DIASTOLIC BLOOD PRESSURE: 88 MMHG | WEIGHT: 204 LBS

## 2017-12-12 DIAGNOSIS — K21.9 GASTROESOPHAGEAL REFLUX DISEASE WITHOUT ESOPHAGITIS: ICD-10-CM

## 2017-12-12 DIAGNOSIS — J30.1 ACUTE ALLERGIC RHINITIS DUE TO POLLEN, UNSPECIFIED SEASONALITY: ICD-10-CM

## 2017-12-12 DIAGNOSIS — R19.7 DIARRHEA, UNSPECIFIED TYPE: Primary | ICD-10-CM

## 2017-12-12 DIAGNOSIS — M25.521 RIGHT ELBOW PAIN: ICD-10-CM

## 2017-12-12 RX ORDER — PHENOL/SODIUM PHENOLATE
20 AEROSOL, SPRAY (ML) MUCOUS MEMBRANE DAILY
Qty: 20 TAB | Refills: 0 | Status: SHIPPED | OUTPATIENT
Start: 2017-12-12 | End: 2019-05-06 | Stop reason: ALTCHOICE

## 2017-12-12 NOTE — PATIENT INSTRUCTIONS
Stop the probiotic and take imodium and omeprazole for the next few days. If no improvement please come in to get the labs Friday or Monday. Try Zrytec for the allergies. Switch arms when carrying luggage. Diarrhea: Care Instructions  Your Care Instructions    Diarrhea is loose, watery stools (bowel movements). The exact cause is often hard to find. Sometimes diarrhea is your body's way of getting rid of what caused an upset stomach. Viruses, food poisoning, and many medicines can cause diarrhea. Some people get diarrhea in response to emotional stress, anxiety, or certain foods. Almost everyone has diarrhea now and then. It usually isn't serious, and your stools will return to normal soon. The important thing to do is replace the fluids you have lost, so you can prevent dehydration. The doctor has checked you carefully, but problems can develop later. If you notice any problems or new symptoms, get medical treatment right away. Follow-up care is a key part of your treatment and safety. Be sure to make and go to all appointments, and call your doctor if you are having problems. It's also a good idea to know your test results and keep a list of the medicines you take. How can you care for yourself at home? · Watch for signs of dehydration, which means your body has lost too much water. Dehydration is a serious condition and should be treated right away. Signs of dehydration are:  ¨ Increasing thirst and dry eyes and mouth. ¨ Feeling faint or lightheaded. ¨ Darker urine, and a smaller amount of urine than normal.  · To prevent dehydration, drink plenty of fluids, enough so that your urine is light yellow or clear like water. Choose water and other caffeine-free clear liquids until you feel better. If you have kidney, heart, or liver disease and have to limit fluids, talk with your doctor before you increase the amount of fluids you drink.   · Begin eating small amounts of mild foods the next day, if you feel like it. ¨ Try yogurt that has live cultures of Lactobacillus. (Check the label.)  ¨ Avoid spicy foods, fruits, alcohol, and caffeine until 48 hours after all symptoms are gone. ¨ Avoid chewing gum that contains sorbitol. ¨ Avoid dairy products (except for yogurt with Lactobacillus) while you have diarrhea and for 3 days after symptoms are gone. · The doctor may recommend that you take over-the-counter medicine, such as loperamide (Imodium), if you still have diarrhea after 6 hours. Read and follow all instructions on the label. Do not use this medicine if you have bloody diarrhea, a high fever, or other signs of serious illness. Call your doctor if you think you are having a problem with your medicine. When should you call for help? Call 911 anytime you think you may need emergency care. For example, call if:  ? · You passed out (lost consciousness). ? · Your stools are maroon or very bloody. ?Call your doctor now or seek immediate medical care if:  ? · You are dizzy or lightheaded, or you feel like you may faint. ? · Your stools are black and look like tar, or they have streaks of blood. ? · You have new or worse belly pain. ? · You have symptoms of dehydration, such as:  ¨ Dry eyes and a dry mouth. ¨ Passing only a little dark urine. ¨ Feeling thirstier than usual.   ? · You have a new or higher fever. ? Watch closely for changes in your health, and be sure to contact your doctor if:  ? · Your diarrhea is getting worse. ? · You see pus in the diarrhea. ? · You are not getting better after 2 days (48 hours). Where can you learn more? Go to http://awilda-nicho.info/. Enter H577 in the search box to learn more about \"Diarrhea: Care Instructions. \"  Current as of: March 20, 2017  Content Version: 11.4  © 4985-4080 [employer].  Care instructions adapted under license by [employer] (which disclaims liability or warranty for this information). If you have questions about a medical condition or this instruction, always ask your healthcare professional. [address] any warranty or liability for your use of this information. Body Mass Index: Care Instructions  Your Care Instructions    Body mass index (BMI) can help you see if your weight is raising your risk for health problems. It uses a formula to compare how much you weigh with how tall you are. · A BMI lower than 18.5 is considered underweight. · A BMI between 18.5 and 24.9 is considered healthy. · A BMI between 25 and 29.9 is considered overweight. A BMI of 30 or higher is considered obese. If your BMI is in the normal range, it means that you have a lower risk for weight-related health problems. If your BMI is in the overweight or obese range, you may be at increased risk for weight-related health problems, such as high blood pressure, heart disease, stroke, arthritis or joint pain, and diabetes. If your BMI is in the underweight range, you may be at increased risk for health problems such as fatigue, lower protection (immunity) against illness, muscle loss, bone loss, hair loss, and hormone problems. BMI is just one measure of your risk for weight-related health problems. You may be at higher risk for health problems if you are not active, you eat an unhealthy diet, or you drink too much alcohol or use tobacco products. Follow-up care is a key part of your treatment and safety. Be sure to make and go to all appointments, and call your doctor if you are having problems. It's also a good idea to know your test results and keep a list of the medicines you take. How can you care for yourself at home? · Practice healthy eating habits. This includes eating plenty of fruits, vegetables, whole grains, lean protein, and low-fat dairy. · If your doctor recommends it, get more exercise. Walking is a good choice.  Bit by bit, increase the amount you walk every day. Try for at least 30 minutes on most days of the week. · Do not smoke. Smoking can increase your risk for health problems. If you need help quitting, talk to your doctor about stop-smoking programs and medicines. These can increase your chances of quitting for good. · Limit alcohol to 2 drinks a day for men and 1 drink a day for women. Too much alcohol can cause health problems. If you have a BMI higher than 25  · Your doctor may do other tests to check your risk for weight-related health problems. This may include measuring the distance around your waist. A waist measurement of more than 40 inches in men or 35 inches in women can increase the risk of weight-related health problems. · Talk with your doctor about steps you can take to stay healthy or improve your health. You may need to make lifestyle changes to lose weight and stay healthy, such as changing your diet and getting regular exercise. If you have a BMI lower than 18.5  · Your doctor may do other tests to check your risk for health problems. · Talk with your doctor about steps you can take to stay healthy or improve your health. You may need to make lifestyle changes to gain or maintain weight and stay healthy, such as getting more healthy foods in your diet and doing exercises to build muscle. Where can you learn more? Go to http://awilda-nicho.info/. Enter S176 in the search box to learn more about \"Body Mass Index: Care Instructions. \"  Current as of: October 13, 2016  Content Version: 11.4  © 0609-5336 Healthwise, [employer]. Care instructions adapted under license by [employer] (which disclaims liability or warranty for this information). If you have questions about a medical condition or this instruction, always ask your healthcare professional. [place] [address] any warranty or liability for your use of this information.

## 2017-12-12 NOTE — PROGRESS NOTES
Subjective: Zofia Beal is a 43 y.o. female who presents today for diarrhea and sinus issue. Diarrhea: has had some diarrhea for last week, mostly watery, some mucous, and some noted abdominal cramping. She denies vomiting and fevers. She denies any dietary changes, she also denies any new supplements or vitamins. She has had a similar episode when she was stressed. She states her anxiety is well controlled, she is not sure if this is contributor. She is on a probiotic. She has tried imodium and states it helped. She also notes that she has been eating a higher carb diet. Sinus concern: been having a lot of sinus drainage and has broken out in a rash thinks it may all be related. She states sinus congestion is related to allergies. She taking allegra daily. She thought it was a cold but she realizes it is allergies, intermittent sneezing. She denies cough, sore throat. She denies sick contacts. GERD: She also notes reflux. She is using Zantac prn. 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 Indications: taking 20mg      nitrofurantoin, macrocrystal-monohydrate, (MACROBID) 100 mg capsule as needed.  fexofenadine (ALLEGRA) 180 mg tablet Take  by mouth.  ALPRAZolam (XANAX) 0.25 mg tablet TAKE ONE TABLET BY MOUTH TWICE DAILY AS NEEDED FOR ANXIETY *MAX  DAILY  AMOUNT  IS  2  TABS*  Indications: anxiety 30 Tab 1    QNASL 80 mcg/actuation HFAA       multivitamin (ONE A DAY) tablet Take 1 Tab by mouth daily.        No Known Allergies  Past Medical History:   Diagnosis Date    Anxiety     Depression     Nontoxic multinodular goiter     Followed with [facility] q6 months starting 7/2015 0 Dr. Minor Vera       Family History   Problem Relation Age of Onset    Diabetes Mother     Thyroid Disease Mother      hypothyroidism    Hypertension Father     High Cholesterol Father     Thyroid Disease Father      goiter, hyperthyroidism    Heart Attack Father      in 29's    Diabetes Maternal Grandmother     Breast Cancer Maternal Grandmother     Diabetes Maternal Grandfather     Diabetes Paternal Grandmother     Diabetes Paternal Grandfather      Social History   Substance Use Topics    Smoking status: Former Smoker     Packs/day: 1.50     Years: 24.00     Types: Cigarettes     Quit date: 8/8/2015    Smokeless tobacco: Never Used      Comment: Has tried hypnotherapy, ecig, Wellbutrin. Chantix worked!  Alcohol use 0.0 oz/week     0 Standard drinks or equivalent per week      Comment: 1-2 glass with dinner 3x/week, 2 glasses Friday/Saturday        Review of Systems    A comprehensive review of systems was negative except for that written in the HPI. Objective:     Visit Vitals    /88    Pulse 82    Temp 98.6 °F (37 °C) (Oral)    Resp 18    Ht 5' 4\" (1.626 m)    Wt 204 lb (92.5 kg)    LMP 12/12/2017 (Exact Date)    SpO2 98%    BMI 35.02 kg/m2     General appearance: alert, cooperative, no distress, appears stated age  Head: Normocephalic, without obvious abnormality, atraumatic  Eyes: negative  Ears: abnormal TM AD - serous middle ear fluid, abnormal TM AS - serous middle ear fluid  Nose: Nares normal. Septum midline. Mucosa normal. No drainage or sinus tenderness. Throat: Lips, mucosa, and tongue normal. Teeth and gums normal  Neck: supple, symmetrical, trachea midline and no adenopathy  Back: symmetric, no curvature. ROM normal. No CVA tenderness.   Lungs: clear to auscultation bilaterally  Heart: regular rate and rhythm, S1, S2 normal, no murmur, click, rub or gallop  Abdomen: soft, non-tender. Bowel sounds normal. No masses,  no organomegaly  Extremities: extremities normal, atraumatic, no cyanosis or edema  Pulses: 2+ and symmetric  Neurologic: Grossly normal  Nursing note and vitals reviewed  Assessment/Plan:       ICD-10-CM ICD-9-CM    1. Diarrhea, unspecified type E48.9 070.58 METABOLIC PANEL, COMPREHENSIVE   2. Gastroesophageal reflux disease without esophagitis K21.9 530.81 Omeprazole delayed release (PRILOSEC D/R) 20 mg tablet   3. Acute allergic rhinitis due to pollen, unspecified seasonality J30.1 477.0 MELATONIN PO      Cetirizine (ZYRTEC) 10 mg cap   4. Right elbow pain M25.521 719.42    Discussed conservative management. She will keep a food journal, use imodium as needed, and increase her dietary fiber. She will try zrtec instead of allegra. She will also trial omeprazole for a short period for her reflux. She will message Friday if she does not have any improvement. Follow-up Disposition:  Return if symptoms worsen or fail to improve. Advised her to call back or return to office if symptoms worsen/change/persist.  Discussed expected course/resolution/complications of diagnosis in detail with patient. Medication risks/benefits/costs/interactions/alternatives discussed with patient. She was given an after visit summary which includes diagnoses, current medications, & vitals. She expressed understanding with the diagnosis and plan. Jayla Mancini has been given the following recommendations today due to her elevated BP reading: rescreen BP within a minimum of 4 weeks. Discussed the patient's BMI with her. The BMI follow up plan is as follows:     dietary management education, guidance, and counseling  encourage exercise  monitor weight  prescribed dietary intake    An After Visit Summary was printed and given to the patient.

## 2017-12-12 NOTE — MR AVS SNAPSHOT
Visit Information Date & Time Provider Department Dept. Phone Encounter #  
 12/12/2017  4:15 PM Adam Sosa, [address] Internal Medicine [phone] [id number] Follow-up Instructions Return if symptoms worsen or fail to improve. Upcoming Health Maintenance Date Due  
 PAP AKA CERVICAL CYTOLOGY 6/15/2018 DTaP/Tdap/Td series (2 - Td) 5/23/2023 Allergies as of 12/12/2017  Review Complete On: 12/12/2017 By: Adam Sosa, LABERTO No Known Allergies Current Immunizations  Reviewed on 10/2/2017 Name Date Influenza Vaccine (Quad) PF 10/2/2017, 11/18/2016, 12/2/2015 Tdap 5/23/2013 Not reviewed this visit You Were Diagnosed With   
  
 Codes Comments Diarrhea, unspecified type    -  Primary ICD-10-CM: R19.7 ICD-9-CM: 787.91 Gastroesophageal reflux disease without esophagitis     ICD-10-CM: K21.9 ICD-9-CM: 530.81 Acute allergic rhinitis due to pollen, unspecified seasonality     ICD-10-CM: J30.1 ICD-9-CM: 477.0 Right elbow pain     ICD-10-CM: M25.521 ICD-9-CM: 719.42 Vitals BP Pulse Temp Resp Height(growth percentile) Weight(growth percentile) 141/88 82 98.6 °F (37 °C) (Oral) 18 5' 4\" (1.626 m) 204 lb (92.5 kg) LMP SpO2 BMI OB Status Smoking Status 12/12/2017 (Exact Date) 98% 35.02 kg/m2 Having regular periods Former Smoker BMI and BSA Data Body Mass Index Body Surface Area 35.02 kg/m 2 2.04 m 2 Preferred Pharmacy Pharmacy Name Phone [facility] [address] [facility] [address] Your Updated Medication List  
  
   
This list is accurate as of: 12/12/17  4:54 PM.  Always use your most recent med list.  
  
  
  
  
 ALPRAZolam 0.25 mg tablet Commonly known as:  XANAX  
TAKE ONE TABLET BY MOUTH TWICE DAILY AS NEEDED FOR ANXIETY *MAX  DAILY  AMOUNT  IS  2  TABS*  Indications: anxiety Cetirizine 10 mg Cap Commonly known as:  ZyrTEC Take 1 Tab by mouth daily. MELATONIN PO Take  by mouth. Indications: taking 20mg  
  
 multivitamin tablet Commonly known as:  ONE A DAY Take 1 Tab by mouth daily. nitrofurantoin (macrocrystal-monohydrate) 100 mg capsule Commonly known as:  MACROBID  
as needed. Omeprazole delayed release 20 mg tablet Commonly known as:  PRILOSEC D/R Take 1 Tab by mouth daily. QNASL 80 mcg/actuation [facility] Generic drug:  beclomethasone dipropionate Prescriptions Sent to Pharmacy Refills Omeprazole delayed release (PRILOSEC D/R) 20 mg tablet 0 Sig: Take 1 Tab by mouth daily. Class: Normal  
 Pharmacy: [id number], [facility] #: [phone] Route: Oral  
  
We Performed the Following METABOLIC PANEL, COMPREHENSIVE [42816 CPT(R)] Follow-up Instructions Return if symptoms worsen or fail to improve. Patient Instructions Stop the probiotic and take imodium and omeprazole for the next few days. If no improvement please come in to get the labs Friday or Monday. Try Zrytec for the allergies. Switch arms when carrying luggage. Diarrhea: Care Instructions Your Care Instructions Diarrhea is loose, watery stools (bowel movements). The exact cause is often hard to find. Sometimes diarrhea is your body's way of getting rid of what caused an upset stomach. Viruses, food poisoning, and many medicines can cause diarrhea. Some people get diarrhea in response to emotional stress, anxiety, or certain foods. Almost everyone has diarrhea now and then. It usually isn't serious, and your stools will return to normal soon. The important thing to do is replace the fluids you have lost, so you can prevent dehydration. The doctor has checked you carefully, but problems can develop later. If you notice any problems or new symptoms, get medical treatment right away. Follow-up care is a key part of your treatment and safety. Be sure to make and go to all appointments, and call your doctor if you are having problems. It's also a good idea to know your test results and keep a list of the medicines you take. How can you care for yourself at home? · Watch for signs of dehydration, which means your body has lost too much water. Dehydration is a serious condition and should be treated right away. Signs of dehydration are: 
¨ Increasing thirst and dry eyes and mouth. ¨ Feeling faint or lightheaded. ¨ Darker urine, and a smaller amount of urine than normal. 
· To prevent dehydration, drink plenty of fluids, enough so that your urine is light yellow or clear like water. Choose water and other caffeine-free clear liquids until you feel better. If you have kidney, heart, or liver disease and have to limit fluids, talk with your doctor before you increase the amount of fluids you drink. · Begin eating small amounts of mild foods the next day, if you feel like it. ¨ Try yogurt that has live cultures of Lactobacillus. (Check the label.) ¨ Avoid spicy foods, fruits, alcohol, and caffeine until 48 hours after all symptoms are gone. ¨ Avoid chewing gum that contains sorbitol. ¨ Avoid dairy products (except for yogurt with Lactobacillus) while you have diarrhea and for 3 days after symptoms are gone. · The doctor may recommend that you take over-the-counter medicine, such as loperamide (Imodium), if you still have diarrhea after 6 hours. Read and follow all instructions on the label. Do not use this medicine if you have bloody diarrhea, a high fever, or other signs of serious illness. Call your doctor if you think you are having a problem with your medicine. When should you call for help? Call 911 anytime you think you may need emergency care. For example, call if: 
? · You passed out (lost consciousness). ? · Your stools are maroon or very bloody. ?Call your doctor now or seek immediate medical care if: 
? · You are dizzy or lightheaded, or you feel like you may faint. ? · Your stools are black and look like tar, or they have streaks of blood. ? · You have new or worse belly pain. ? · You have symptoms of dehydration, such as: ¨ Dry eyes and a dry mouth. ¨ Passing only a little dark urine. ¨ Feeling thirstier than usual.  
? · You have a new or higher fever. ? Watch closely for changes in your health, and be sure to contact your doctor if: 
? · Your diarrhea is getting worse. ? · You see pus in the diarrhea. ? · You are not getting better after 2 days (48 hours). Where can you learn more? Go to http://awilda-nicho.info/. Enter H829 in the search box to learn more about \"Diarrhea: Care Instructions. \" Current as of: March 20, 2017 Content Version: 11.4 © 5453-2967 [employer]. Care instructions adapted under license by [employer] (which disclaims liability or warranty for this information). If you have questions about a medical condition or this instruction, always ask your healthcare professional. [place] [address] any warranty or liability for your use of this information. Introducing \[facility]\"" & HEALTH SERVICES! Dear Dimitris Mae: 
Thank you for requesting a [employer] account. Our records indicate that you already have an active [employer] account. You can access your account anytime at [email or web address] Did you know that you can access your hospital and ER discharge instructions at any time in [employer]? You can also review all of your test results from your hospital stay or ER visit. Additional Information If you have questions, please visit the Frequently Asked Questions section of the [employer] website at [email or web address] Remember, Bio[employer]ego is NOT to be used for urgent needs. For medical emergencies, dial 911. Now available from your iPhone and Android! Please provide this summary of care documentation to your next provider. Your primary care clinician is listed as Ryan Pillai. If you have any questions after today's visit, please call [phone].

## 2017-12-12 NOTE — PROGRESS NOTES
Chief Complaint   Patient presents with    Diarrhea     has had some diarrhea for last week, mostly watery and some noted abdominal cramping, No vomiting. has seen some mucous in the stool.  Sinus Infection     been having a lot of sinus drainage and has broken out in a rash thinks it may all be related. 1. Have you been to the ER, urgent care clinic since your last visit? Hospitalized since your last visit? No    2. Have you seen or consulted any other health care providers outside of the [address] [place] [address] since your last visit? Include any pap smears or colon screening.  No

## 2017-12-13 ENCOUNTER — OFFICE VISIT (OUTPATIENT)
Dept: INTERNAL MEDICINE CLINIC | Facility: CLINIC | Age: 42
End: 2017-12-13

## 2017-12-13 VITALS
BODY MASS INDEX: 34.95 KG/M2 | HEIGHT: 64 IN | WEIGHT: 204.7 LBS | SYSTOLIC BLOOD PRESSURE: 136 MMHG | RESPIRATION RATE: 16 BRPM | DIASTOLIC BLOOD PRESSURE: 88 MMHG | OXYGEN SATURATION: 97 % | HEART RATE: 76 BPM | TEMPERATURE: 97.6 F

## 2017-12-13 DIAGNOSIS — R35.0 URINARY FREQUENCY: ICD-10-CM

## 2017-12-13 DIAGNOSIS — N30.01 ACUTE CYSTITIS WITH HEMATURIA: Primary | ICD-10-CM

## 2017-12-13 LAB
BILIRUB UR QL STRIP: NEGATIVE
GLUCOSE UR-MCNC: NORMAL MG/DL
KETONES P FAST UR STRIP-MCNC: NEGATIVE MG/DL
PH UR STRIP: 5 [PH] (ref 4.6–8)
PROT UR QL STRIP: NORMAL
SP GR UR STRIP: 1 (ref 1–1.03)
UA UROBILINOGEN AMB POC: NORMAL (ref 0.2–1)
URINALYSIS CLARITY POC: CLEAR
URINALYSIS COLOR POC: NORMAL
URINE BLOOD POC: NORMAL
URINE LEUKOCYTES POC: NORMAL
URINE NITRITES POC: POSITIVE

## 2017-12-13 RX ORDER — NITROFURANTOIN 25; 75 MG/1; MG/1
100 CAPSULE ORAL 2 TIMES DAILY
Qty: 14 CAP | Refills: 0 | Status: SHIPPED | OUTPATIENT
Start: 2017-12-13 | End: 2017-12-15

## 2017-12-13 NOTE — PROGRESS NOTES
Subjective: Kassandra Segura is a 43 y.o. female who presents today for urinary complaint. UTI: she states she has noted suprapubic pressure and urinary frequency since last night. She denies dysuria, fevers, nausea, vomiting. She has taken azo today. She was just in the office yesterday evening. 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 Indications: taking 20mg      Cetirizine (ZYRTEC) 10 mg cap Take 1 Tab by mouth daily. 30 Cap 2    Omeprazole delayed release (PRILOSEC D/R) 20 mg tablet Take 1 Tab by mouth daily. 20 Tab 0    nitrofurantoin, macrocrystal-monohydrate, (MACROBID) 100 mg capsule as needed.  ALPRAZolam (XANAX) 0.25 mg tablet TAKE ONE TABLET BY MOUTH TWICE DAILY AS NEEDED FOR ANXIETY *MAX  DAILY  AMOUNT  IS  2  TABS*  Indications: anxiety 30 Tab 1    QNASL 80 mcg/actuation HFAA       multivitamin (ONE A DAY) tablet Take 1 Tab by mouth daily.        No Known Allergies  Past Medical History:   Diagnosis Date    Anxiety     Depression     Nontoxic multinodular goiter     Followed with [facility] q6 months starting 7/2015 0 Dr. Abbi Underwood       Family History   Problem Relation Age of Onset    Diabetes Mother     Thyroid Disease Mother      hypothyroidism    Hypertension Father     High Cholesterol Father     Thyroid Disease Father      goiter, hyperthyroidism    Heart Attack Father      in 29's    Diabetes Maternal Grandmother     Breast Cancer Maternal Grandmother     Diabetes Maternal Grandfather     Diabetes Paternal Grandmother     Diabetes Paternal Grandfather      Social History   Substance Use Topics    Smoking status: Former Smoker     Packs/day: 1.50     Years: 24.00     Types: Cigarettes     Quit date: 8/8/2015    Smokeless tobacco: Never Used      Comment: Has tried hypnotherapy, ecig, Wellbutrin. Chantix worked!  Alcohol use 0.0 oz/week     0 Standard drinks or equivalent per week      Comment: 1-2 glass with dinner 3x/week, 2 glasses Friday/Saturday        Review of Systems    A comprehensive review of systems was negative except for that written in the HPI. Objective:     Visit Vitals    /88 (BP 1 Location: Right arm, BP Patient Position: Sitting)    Pulse 76    Temp 97.6 °F (36.4 °C) (Oral)    Resp 16    Ht 5' 4\" (1.626 m)    Wt 204 lb 11.2 oz (92.9 kg)    LMP 12/12/2017 (Exact Date)    SpO2 97%    BMI 35.14 kg/m2     General appearance: alert, cooperative, no distress, appears stated age  Head: Normocephalic, without obvious abnormality, atraumatic  Eyes: negative  Lungs: clear to auscultation bilaterally  Heart: regular rate and rhythm, S1, S2 normal, no murmur, click, rub or gallop  Abdomen: soft, non-tender. Bowel sounds normal. No masses,  no organomegaly  Neurologic: Grossly normal  Nursing note and vitals reviewed  Assessment/Plan:       ICD-10-CM ICD-9-CM    1. Acute cystitis with hematuria N30.01 595.0 CULTURE, URINE      nitrofurantoin, macrocrystal-monohydrate, (MACROBID) 100 mg capsule   2. Urinary frequency R35.0 788.41 AMB POC URINALYSIS DIP STICK AUTO W/O MICRO          Advised her to call back or return to office if symptoms worsen/change/persist.  Discussed expected course/resolution/complications of diagnosis in detail with patient. Medication risks/benefits/costs/interactions/alternatives discussed with patient. She was given an after visit summary which includes diagnoses, current medications, & vitals.   She expressed understanding with the diagnosis and plan. Discussed the patient's BMI with her. The BMI follow up plan is as follows:     dietary management education, guidance, and counseling  encourage exercise  monitor weight  prescribed dietary intake    An After Visit Summary was printed and given to the patient.

## 2017-12-13 NOTE — PATIENT INSTRUCTIONS
Body Mass Index: Care Instructions  Your Care Instructions    Body mass index (BMI) can help you see if your weight is raising your risk for health problems. It uses a formula to compare how much you weigh with how tall you are. · A BMI lower than 18.5 is considered underweight. · A BMI between 18.5 and 24.9 is considered healthy. · A BMI between 25 and 29.9 is considered overweight. A BMI of 30 or higher is considered obese. If your BMI is in the normal range, it means that you have a lower risk for weight-related health problems. If your BMI is in the overweight or obese range, you may be at increased risk for weight-related health problems, such as high blood pressure, heart disease, stroke, arthritis or joint pain, and diabetes. If your BMI is in the underweight range, you may be at increased risk for health problems such as fatigue, lower protection (immunity) against illness, muscle loss, bone loss, hair loss, and hormone problems. BMI is just one measure of your risk for weight-related health problems. You may be at higher risk for health problems if you are not active, you eat an unhealthy diet, or you drink too much alcohol or use tobacco products. Follow-up care is a key part of your treatment and safety. Be sure to make and go to all appointments, and call your doctor if you are having problems. It's also a good idea to know your test results and keep a list of the medicines you take. How can you care for yourself at home? · Practice healthy eating habits. This includes eating plenty of fruits, vegetables, whole grains, lean protein, and low-fat dairy. · If your doctor recommends it, get more exercise. Walking is a good choice. Bit by bit, increase the amount you walk every day. Try for at least 30 minutes on most days of the week. · Do not smoke. Smoking can increase your risk for health problems. If you need help quitting, talk to your doctor about stop-smoking programs and medicines. These can increase your chances of quitting for good. · Limit alcohol to 2 drinks a day for men and 1 drink a day for women. Too much alcohol can cause health problems. If you have a BMI higher than 25  · Your doctor may do other tests to check your risk for weight-related health problems. This may include measuring the distance around your waist. A waist measurement of more than 40 inches in men or 35 inches in women can increase the risk of weight-related health problems. · Talk with your doctor about steps you can take to stay healthy or improve your health. You may need to make lifestyle changes to lose weight and stay healthy, such as changing your diet and getting regular exercise. If you have a BMI lower than 18.5  · Your doctor may do other tests to check your risk for health problems. · Talk with your doctor about steps you can take to stay healthy or improve your health. You may need to make lifestyle changes to gain or maintain weight and stay healthy, such as getting more healthy foods in your diet and doing exercises to build muscle. Where can you learn more? Go to http://awilda-nicho.info/. Enter S176 in the search box to learn more about \"Body Mass Index: Care Instructions. \"  Current as of: October 13, 2016  Content Version: 11.4  © 7731-7357 [employer]. Care instructions adapted under license by [employer] (which disclaims liability or warranty for this information). If you have questions about a medical condition or this instruction, always ask your healthcare professional. [place] [address] any warranty or liability for your use of this information. Urinary Tract Infection in Women: Care Instructions  Your Care Instructions    A urinary tract infection, or UTI, is a general term for an infection anywhere between the kidneys and the urethra (where urine comes out). Most UTIs are bladder infections.  They often cause pain or burning when you urinate. UTIs are caused by bacteria and can be cured with antibiotics. Be sure to complete your treatment so that the infection goes away. Follow-up care is a key part of your treatment and safety. Be sure to make and go to all appointments, and call your doctor if you are having problems. It's also a good idea to know your test results and keep a list of the medicines you take. How can you care for yourself at home? · Take your antibiotics as directed. Do not stop taking them just because you feel better. You need to take the full course of antibiotics. · Drink extra water and other fluids for the next day or two. This may help wash out the bacteria that are causing the infection. (If you have kidney, heart, or liver disease and have to limit fluids, talk with your doctor before you increase your fluid intake.)  · Avoid drinks that are carbonated or have caffeine. They can irritate the bladder. · Urinate often. Try to empty your bladder each time. · To relieve pain, take a hot bath or lay a heating pad set on low over your lower belly or genital area. Never go to sleep with a heating pad in place. To prevent UTIs  · Drink plenty of water each day. This helps you urinate often, which clears bacteria from your system. (If you have kidney, heart, or liver disease and have to limit fluids, talk with your doctor before you increase your fluid intake.)  · Urinate when you need to. · Urinate right after you have sex. · Change sanitary pads often. · Avoid douches, bubble baths, feminine hygiene sprays, and other feminine hygiene products that have deodorants. · After going to the bathroom, wipe from front to back. When should you call for help? Call your doctor now or seek immediate medical care if:  ? · Symptoms such as fever, chills, nausea, or vomiting get worse or appear for the first time. ? · You have new pain in your back just below your rib cage. This is called flank pain. ? · There is new blood or pus in your urine. ? · You have any problems with your antibiotic medicine. ? Watch closely for changes in your health, and be sure to contact your doctor if:  ? · You are not getting better after taking an antibiotic for 2 days. ? · Your symptoms go away but then come back. Where can you learn more? Go to http://awilda-nicho.info/. Enter K052 in the search box to learn more about \"Urinary Tract Infection in Women: Care Instructions. \"  Current as of: May 12, 2017  Content Version: 11.4  © 8630-7804 [employer]. Care instructions adapted under license by [employer] (which disclaims liability or warranty for this information). If you have questions about a medical condition or this instruction, always ask your healthcare professional. [address] any warranty or liability for your use of this information.

## 2017-12-15 DIAGNOSIS — B37.31 VAGINAL YEAST INFECTION: Primary | ICD-10-CM

## 2017-12-15 LAB — BACTERIA UR CULT: NO GROWTH

## 2017-12-15 RX ORDER — FLUCONAZOLE 150 MG/1
150 TABLET ORAL DAILY
Qty: 1 TAB | Refills: 0 | Status: SHIPPED | OUTPATIENT
Start: 2017-12-15 | End: 2017-12-16

## 2017-12-20 LAB
ALBUMIN SERPL-MCNC: 4.4 G/DL (ref 3.5–5.5)
ALBUMIN/GLOB SERPL: 1.9 {RATIO} (ref 1.2–2.2)
ALP SERPL-CCNC: 59 IU/L (ref 39–117)
ALT SERPL-CCNC: 55 IU/L (ref 0–32)
AST SERPL-CCNC: 27 IU/L (ref 0–40)
BILIRUB SERPL-MCNC: 0.3 MG/DL (ref 0–1.2)
BUN SERPL-MCNC: 9 MG/DL (ref 6–24)
BUN/CREAT SERPL: 12 (ref 9–23)
CALCIUM SERPL-MCNC: 9.3 MG/DL (ref 8.7–10.2)
CHLORIDE SERPL-SCNC: 102 MMOL/L (ref 96–106)
CO2 SERPL-SCNC: 23 MMOL/L (ref 18–29)
CREAT SERPL-MCNC: 0.74 MG/DL (ref 0.57–1)
GLOBULIN SER CALC-MCNC: 2.3 G/DL (ref 1.5–4.5)
GLUCOSE SERPL-MCNC: 109 MG/DL (ref 65–99)
POTASSIUM SERPL-SCNC: 4.2 MMOL/L (ref 3.5–5.2)
PROT SERPL-MCNC: 6.7 G/DL (ref 6–8.5)
SODIUM SERPL-SCNC: 141 MMOL/L (ref 134–144)

## 2017-12-27 LAB
GGT SERPL-CCNC: 51 IU/L (ref 0–60)
SPECIMEN STATUS REPORT, ROLRST: NORMAL

## 2018-01-12 DIAGNOSIS — R19.7 DIARRHEA, UNSPECIFIED TYPE: Primary | ICD-10-CM

## 2018-01-17 ENCOUNTER — HOSPITAL ENCOUNTER (OUTPATIENT)
Dept: ULTRASOUND IMAGING | Age: 43
Discharge: HOME | End: 2018-01-17
Attending: INTERNAL MEDICINE
Payer: COMMERCIAL

## 2018-01-17 DIAGNOSIS — R74.8 ABNORMAL TRANSAMINASES: ICD-10-CM

## 2018-01-17 DIAGNOSIS — R19.7 DIARRHEA: ICD-10-CM

## 2018-01-17 PROCEDURE — 76700 US EXAM ABDOM COMPLETE: CPT

## 2018-01-30 ENCOUNTER — OFFICE VISIT (OUTPATIENT)
Dept: INTERNAL MEDICINE CLINIC | Facility: CLINIC | Age: 43
End: 2018-01-30

## 2018-01-30 VITALS
TEMPERATURE: 96.7 F | OXYGEN SATURATION: 96 % | HEIGHT: 64 IN | BODY MASS INDEX: 34.64 KG/M2 | DIASTOLIC BLOOD PRESSURE: 68 MMHG | RESPIRATION RATE: 18 BRPM | SYSTOLIC BLOOD PRESSURE: 134 MMHG | HEART RATE: 95 BPM | WEIGHT: 202.9 LBS

## 2018-01-30 DIAGNOSIS — R30.0 DYSURIA: Primary | ICD-10-CM

## 2018-01-30 DIAGNOSIS — N30.00 ACUTE CYSTITIS WITHOUT HEMATURIA: ICD-10-CM

## 2018-01-30 DIAGNOSIS — F41.9 ANXIETY: ICD-10-CM

## 2018-01-30 DIAGNOSIS — E66.9 OBESITY (BMI 30-39.9): ICD-10-CM

## 2018-01-30 LAB
BILIRUB UR QL STRIP: NEGATIVE
GLUCOSE UR-MCNC: NEGATIVE MG/DL
KETONES P FAST UR STRIP-MCNC: NEGATIVE MG/DL
PH UR STRIP: 5 [PH] (ref 4.6–8)
PROT UR QL STRIP: NEGATIVE
SP GR UR STRIP: 1.01 (ref 1–1.03)
UA UROBILINOGEN AMB POC: NORMAL (ref 0.2–1)
URINALYSIS CLARITY POC: CLEAR
URINALYSIS COLOR POC: NORMAL
URINE BLOOD POC: NORMAL
URINE LEUKOCYTES POC: NORMAL
URINE NITRITES POC: POSITIVE

## 2018-01-30 RX ORDER — BUDESONIDE 3 MG/1
CAPSULE, COATED PELLETS ORAL
COMMUNITY
Start: 2018-01-23 | End: 2019-05-06 | Stop reason: ALTCHOICE

## 2018-01-30 RX ORDER — ALPRAZOLAM 0.25 MG/1
TABLET ORAL
Qty: 30 TAB | Refills: 1 | Status: SHIPPED | OUTPATIENT
Start: 2018-01-30 | End: 2018-10-23

## 2018-01-30 NOTE — PROGRESS NOTES
HISTORY OF PRESENT ILLNESS  Octavia Farr is a 43 y.o. female. Chief Complaint   Patient presents with    Urinary Frequency     Patient states she has burning and urgency. Room 7     HPI  1) UTI - started taking Macrobid this morning and is starting to feel better, but wanted to see us here in case Macrobid did not work as she is going out of town this week. Results for orders placed or performed in visit on 01/30/18   AMB POC URINALYSIS DIP STICK AUTO W/O MICRO     Status: None   Result Value Ref Range Status    Color (UA POC) Orange  Final    Clarity (UA POC) Clear  Final    Glucose (UA POC) Negative Negative Final    Bilirubin (UA POC) Negative Negative Final    Ketones (UA POC) Negative Negative Final    Specific gravity (UA POC) 1.010 1.001 - 1.035 Final    Blood (UA POC) 1+ Negative Final    pH (UA POC) 5.0 4.6 - 8.0 Final    Protein (UA POC) Negative Negative Final    Urobilinogen (UA POC) 0.2 mg/dL 0.2 - 1 Final    Nitrites (UA POC) Positive Negative Final    Leukocyte esterase (UA POC) 1+ Negative Final         2) Anxiety - Last dose of Xanax 1 month ago. Seeing counselor and psychiatrist.   Boyfriend is a  as of 8 years ago and wife's death was Valentine's Day, so this time of year is stressful for them. 3) Obesity - pt plans to start following ZG diet again to help with weight and GI issues. Wt Readings from Last 3 Encounters:   01/30/18 202 lb 14.4 oz (92 kg)   12/13/17 204 lb 11.2 oz (92.9 kg)   12/12/17 204 lb (92.5 kg)       Review of Systems   Constitutional: Negative for fever. Gastrointestinal: Negative for abdominal pain. Genitourinary: Positive for dysuria, frequency and urgency. Negative for flank pain and hematuria. Denies vaginal discharge. Psychiatric/Behavioral: Negative for depression. The patient is not nervous/anxious. Physical Exam   Constitutional: She is oriented to person, place, and time. She appears well-developed and well-nourished.  No distress. HENT:   Head: Normocephalic and atraumatic. Neck: Neck supple. No JVD present. Cardiovascular: Normal rate, regular rhythm and normal heart sounds. Pulmonary/Chest: Effort normal and breath sounds normal. No respiratory distress. Genitourinary:   Genitourinary Comments: Bilateral costovertebral angles nontender to palpation. Musculoskeletal: She exhibits no edema. Neurological: She is alert and oriented to person, place, and time. Skin: Skin is warm and dry. Psychiatric: She has a normal mood and affect. Her behavior is normal. Judgment and thought content normal.   Nursing note and vitals reviewed. ASSESSMENT and PLAN    ICD-10-CM ICD-9-CM    1. Dysuria R30.0 788.1 AMB POC URINALYSIS DIP STICK AUTO W/O MICRO   2. Anxiety F41.9 300.00 ALPRAZolam (XANAX) 0.25 mg tablet   3. Acute cystitis without hematuria N30.00 595.0 Macrobid BID x 7 days - pt has supply at home. Instructed pt to call for a different AB if this does not improve symptoms. 4.  Obesity - discussed diet/exercise.

## 2018-01-30 NOTE — PROGRESS NOTES
Chief Complaint   Patient presents with    Urinary Frequency     Patient states she has burning and urgency. Room 7       1. Have you been to the ER, urgent care clinic since your last visit? Hospitalized since your last visit? No    2. Have you seen or consulted any other health care providers outside of the [address] [place] [address] since your last visit? Include any pap smears or colon screening.  No     No HM due

## 2018-04-05 DIAGNOSIS — N39.0 CHRONIC UTI: ICD-10-CM

## 2018-04-05 RX ORDER — NITROFURANTOIN 25; 75 MG/1; MG/1
CAPSULE ORAL
Qty: 30 CAP | Refills: 2 | Status: SHIPPED | OUTPATIENT
Start: 2018-04-05 | End: 2019-04-01 | Stop reason: ALTCHOICE

## 2018-10-23 ENCOUNTER — OFFICE VISIT (OUTPATIENT)
Dept: INTERNAL MEDICINE CLINIC | Age: 43
End: 2018-10-23

## 2018-10-23 VITALS
BODY MASS INDEX: 35.48 KG/M2 | HEART RATE: 86 BPM | SYSTOLIC BLOOD PRESSURE: 122 MMHG | OXYGEN SATURATION: 98 % | WEIGHT: 207.8 LBS | HEIGHT: 64 IN | DIASTOLIC BLOOD PRESSURE: 78 MMHG | RESPIRATION RATE: 18 BRPM | TEMPERATURE: 98.8 F

## 2018-10-23 DIAGNOSIS — Z00.00 ANNUAL PHYSICAL EXAM: Primary | ICD-10-CM

## 2018-10-23 DIAGNOSIS — R06.83 SNORING: ICD-10-CM

## 2018-10-23 DIAGNOSIS — D68.2 FACTOR V DEFICIENCY (HCC): ICD-10-CM

## 2018-10-23 DIAGNOSIS — Z23 ENCOUNTER FOR IMMUNIZATION: ICD-10-CM

## 2018-10-23 PROBLEM — F33.9 RECURRENT DEPRESSION (HCC): Status: ACTIVE | Noted: 2018-10-23

## 2018-10-23 PROBLEM — E66.01 SEVERE OBESITY (HCC): Status: ACTIVE | Noted: 2018-10-23

## 2018-10-23 RX ORDER — FLUOXETINE HYDROCHLORIDE 40 MG/1
40 CAPSULE ORAL DAILY
COMMUNITY
Start: 2018-10-12 | End: 2020-10-14 | Stop reason: ALTCHOICE

## 2018-10-23 NOTE — PROGRESS NOTES
1. Have you been to the ER, urgent care clinic since your last visit? Hospitalized since your last visit? No 
 
2. Have you seen or consulted any other health care providers outside of the [address] [place] [address] since your last visit? Include any pap smears or colon screening. Yes Chief Complaint Patient presents with  Complete Physical  
 
Fasting Abuse Screening Questionnaire 10/23/2018 Do you ever feel afraid of your partner? Dave [place] Are you in a relationship with someone who physically or mentally threatens you? Dave [place] Is it safe for you to go home? Y  
 
PHQ over the last two weeks 10/23/2018 Little interest or pleasure in doing things Not at all Feeling down, depressed, irritable, or hopeless Not at all Total Score PHQ 2 0

## 2018-10-23 NOTE — PROGRESS NOTES
HISTORY OF PRESENT ILLNESS Kamilla Jensen is a 37 y.o. female. Chief Complaint Patient presents with  Complete Physical  
 
Health Maintenance Due Topic Date Due  
 PAP AKA CERVICAL CYTOLOGY  06/15/2018  Influenza Age 5 to Adult  08/01/2018 Influenza: will get this done today. Having paps done at GYN regularly HPI 
CE today Anxiety - Taking Prozac 40 mg daily, Off of Trazodone 100 mg, and Xanax 0.25 mg. Seeing Psych and doing well. Changed jobs, and is now working in a more positive environment. Wt Readings from Last 3 Encounters:  
10/23/18 207 lb 12.8 oz (94.3 kg) 01/30/18 202 lb 14.4 oz (92 kg) 12/13/17 204 lb 11.2 oz (92.9 kg) Weight gain this year. Pt attributes this to driving regularly for work and eating fast food regularly. Feeling tired lately. Getting 8-9 hours/sleep per night. Snoring regularly. Review of Systems Constitutional: Negative for fever and weight loss. HENT: Negative for congestion, hearing loss and sore throat. Eyes: Negative for blurred vision. Respiratory: Negative for cough and shortness of breath. Cardiovascular: Negative for chest pain, palpitations and leg swelling. Gastrointestinal: Negative for abdominal pain, blood in stool, constipation, diarrhea, heartburn, melena, nausea and vomiting. Genitourinary: Negative for dysuria, frequency, hematuria and urgency. Musculoskeletal: Negative for back pain, joint pain and neck pain. Neurological: Negative for dizziness, seizures, loss of consciousness, weakness and headaches. Endo/Heme/Allergies: Negative for environmental allergies. Psychiatric/Behavioral: Negative for depression and substance abuse. The patient is not nervous/anxious and does not have insomnia. Physical Exam  
Constitutional: She is oriented to person, place, and time. She appears well-developed and well-nourished. No distress. HENT:  
Head: Normocephalic and atraumatic. Right Ear: External ear normal.  
Left Ear: External ear normal.  
Nose: Nose normal.  
Mouth/Throat: Oropharynx is clear and moist.  
Eyes: Conjunctivae are normal.  
Neck: Neck supple. No JVD present. Thyromegaly present.  
(thyroid followed yearly by endocrine) Cardiovascular: Normal rate, regular rhythm and normal heart sounds. Pulmonary/Chest: Effort normal and breath sounds normal. No respiratory distress. Abdominal: Soft. Bowel sounds are normal. She exhibits no distension and no mass. There is no tenderness. There is no rebound and no guarding. Musculoskeletal: She exhibits no edema. Lymphadenopathy:  
  She has no cervical adenopathy. Neurological: She is alert and oriented to person, place, and time. Skin: Skin is warm and dry. Psychiatric: She has a normal mood and affect. Her behavior is normal. Judgment and thought content normal.  
Nursing note and vitals reviewed. ASSESSMENT and PLAN Diagnoses and all orders for this visit: 
 
1. Annual physical exam 
-     CBC WITH AUTOMATED DIFF 
-     METABOLIC PANEL, COMPREHENSIVE 
-     LIPID PANEL 
-     TSH RFX ON ABNORMAL TO FREE T4 Discussed diet/exercise and options for/importance of losing weight. 2. Encounter for immunization 
-     INFLUENZA VIRUS VAC QUAD,SPLIT,PRESV FREE SYRINGE IM 3. Snoring 
-     SLEEP MEDICINE REFERRAL 4. Factor V deficiency ([facility] [address] - currently off of OCP and doing well.

## 2018-10-23 NOTE — PATIENT INSTRUCTIONS
Vaccine Information Statement Influenza (Flu) Vaccine (Inactivated or Recombinant): What you need to know Many Vaccine Information Statements are available in [place] and other languages. See www.immunize.org/vis Hojas de Información Sobre Vacunas están disponibles en Español y en muchos otros idiomas. Visite www.immunize.org/vis 1. Why get vaccinated? Influenza (flu) is a contagious disease that spreads around the [place] every year, usually between October and May. Flu is caused by influenza viruses, and is spread mainly by coughing, sneezing, and close contact. Anyone can get flu. Flu strikes suddenly and can last several days. Symptoms vary by age, but can include: 
 fever/chills  sore throat  muscle aches  fatigue  cough  headache  runny or stuffy nose Flu can also lead to pneumonia and blood infections, and cause diarrhea and seizures in children. If you have a medical condition, such as heart or lung disease, flu can make it worse. Flu is more dangerous for some people. Infants and young children, people 72years of age and older, pregnant women, and people with certain health conditions or a weakened immune system are at greatest risk. Each year thousands of people in the [facility] die from flu, and many more are hospitalized. Flu vaccine can: 
 keep you from getting flu, 
 make flu less severe if you do get it, and 
 keep you from spreading flu to your family and other people. 2. Inactivated and recombinant flu vaccines A dose of flu vaccine is recommended every flu season. Children 6 months through 6years of age may need two doses during the same flu season. Everyone else needs only one dose each flu season.   
 
 
Some inactivated flu vaccines contain a very small amount of a mercury-based preservative called thimerosal. Studies have not shown thimerosal in vaccines to be harmful, but flu vaccines that do not contain thimerosal are available. There is no live flu virus in flu shots. They cannot cause the flu. There are many flu viruses, and they are always changing. Each year a new flu vaccine is made to protect against three or four viruses that are likely to cause disease in the upcoming flu season. But even when the vaccine doesnt exactly match these viruses, it may still provide some protection Flu vaccine cannot prevent: 
 flu that is caused by a virus not covered by the vaccine, or 
 illnesses that look like flu but are not. It takes about 2 weeks for protection to develop after vaccination, and protection lasts through the flu season. 3. Some people should not get this vaccine Tell the person who is giving you the vaccine:  If you have any severe, life-threatening allergies. If you ever had a life-threatening allergic reaction after a dose of flu vaccine, or have a severe allergy to any part of this vaccine, you may be advised not to get vaccinated. Most, but not all, types of flu vaccine contain a small amount of egg protein.  If you ever had Guillain-Barré Syndrome (also called GBS). Some people with a history of GBS should not get this vaccine. This should be discussed with your doctor.  If you are not feeling well. It is usually okay to get flu vaccine when you have a mild illness, but you might be asked to come back when you feel better. 4. Risks of a vaccine reaction With any medicine, including vaccines, there is a chance of reactions. These are usually mild and go away on their own, but serious reactions are also possible. Most people who get a flu shot do not have any problems with it. Minor problems following a flu shot include:  
 soreness, redness, or swelling where the shot was given  hoarseness  sore, red or itchy eyes  cough  fever  aches  headache  itching  fatigue If these problems occur, they usually begin soon after the shot and last 1 or 2 days. More serious problems following a flu shot can include the following:  There may be a small increased risk of Guillain-Barré Syndrome (GBS) after inactivated flu vaccine. This risk has been estimated at 1 or 2 additional cases per million people vaccinated. This is much lower than the risk of severe complications from flu, which can be prevented by flu vaccine.  Young children who get the flu shot along with pneumococcal vaccine (PCV13) and/or DTaP vaccine at the same time might be slightly more likely to have a seizure caused by fever. Ask your doctor for more information. Tell your doctor if a child who is getting flu vaccine has ever had a seizure. Problems that could happen after any injected vaccine:  People sometimes faint after a medical procedure, including vaccination. Sitting or lying down for about 15 minutes can help prevent fainting, and injuries caused by a fall. Tell your doctor if you feel dizzy, or have vision changes or ringing in the ears.  Some people get severe pain in the shoulder and have difficulty moving the arm where a shot was given. This happens very rarely.  Any medication can cause a severe allergic reaction. Such reactions from a vaccine are very rare, estimated at about 1 in a million doses, and would happen within a few minutes to a few hours after the vaccination. As with any medicine, there is a very remote chance of a vaccine causing a serious injury or death. The safety of vaccines is always being monitored. For more information, visit: www.cdc.gov/vaccinesafety/ 
 
5. What if there is a serious reaction? What should I look for?  Look for anything that concerns you, such as signs of a severe allergic reaction, very high fever, or unusual behavior.  
 
Signs of a severe allergic reaction can include hives, swelling of the face and throat, difficulty breathing, a fast heartbeat, dizziness, and weakness  usually within a few minutes to a few hours after the vaccination. What should I do?  If you think it is a severe allergic reaction or other emergency that cant wait, call 9-1-1 and get the person to the nearest hospital. Otherwise, call your doctor.  Reactions should be reported to the Vaccine Adverse Event Reporting System (VAERS). Your doctor should file this report, or you can do it yourself through  the VAERS web site at www.vaers. [facility].gov, or by calling 4-256.176.6733. VAERS does not give medical advice. 6. The National Vaccine Injury Compensation Program 
 
The [facility] Vaccine Injury Compensation Program (VICP) is a federal program that was created to compensate people who may have been injured by certain vaccines. Persons who believe they may have been injured by a vaccine can learn about the program and about filing a claim by calling 0-686.884.2801 or visiting the [employer] website at www.[facility].gov/vaccinecompensation. There is a time limit to file a claim for compensation. 7. How can I learn more?  Ask your healthcare provider. He or she can give you the vaccine package insert or suggest other sources of information.  Call your local or state health department.  Contact the Centers for Disease Control and Prevention (CDC): 
- Call 4-158.969.3342 (1-800-CDC-INFO) or 
- Visit CDCs website at www.cdc.gov/flu Vaccine Information Statement Inactivated Influenza Vaccine 8/7/2015 
42 STACEY Mosqueda [id number] Department of Health and [employer] Centers for Disease Control and Prevention Office Use Only

## 2018-10-24 LAB
ALBUMIN SERPL-MCNC: 4.4 G/DL (ref 3.5–5.5)
ALBUMIN/GLOB SERPL: 1.8 {RATIO} (ref 1.2–2.2)
ALP SERPL-CCNC: 77 IU/L (ref 39–117)
ALT SERPL-CCNC: 62 IU/L (ref 0–32)
AST SERPL-CCNC: 33 IU/L (ref 0–40)
BASOPHILS # BLD AUTO: 0 X10E3/UL (ref 0–0.2)
BASOPHILS NFR BLD AUTO: 1 %
BILIRUB SERPL-MCNC: <0.2 MG/DL (ref 0–1.2)
BUN SERPL-MCNC: 11 MG/DL (ref 6–24)
BUN/CREAT SERPL: 15 (ref 9–23)
CALCIUM SERPL-MCNC: 9.8 MG/DL (ref 8.7–10.2)
CHLORIDE SERPL-SCNC: 103 MMOL/L (ref 96–106)
CHOLEST SERPL-MCNC: 170 MG/DL (ref 100–199)
CO2 SERPL-SCNC: 22 MMOL/L (ref 20–29)
CREAT SERPL-MCNC: 0.71 MG/DL (ref 0.57–1)
EOSINOPHIL # BLD AUTO: 0.2 X10E3/UL (ref 0–0.4)
EOSINOPHIL NFR BLD AUTO: 3 %
ERYTHROCYTE [DISTWIDTH] IN BLOOD BY AUTOMATED COUNT: 13.5 % (ref 12.3–15.4)
GLOBULIN SER CALC-MCNC: 2.5 G/DL (ref 1.5–4.5)
GLUCOSE SERPL-MCNC: 99 MG/DL (ref 65–99)
HCT VFR BLD AUTO: 40.4 % (ref 34–46.6)
HDLC SERPL-MCNC: 61 MG/DL
HGB BLD-MCNC: 13.2 G/DL (ref 11.1–15.9)
IMM GRANULOCYTES # BLD: 0 X10E3/UL (ref 0–0.1)
IMM GRANULOCYTES NFR BLD: 0 %
INTERPRETATION, 910389: NORMAL
LDLC SERPL CALC-MCNC: 65 MG/DL (ref 0–99)
LYMPHOCYTES # BLD AUTO: 2.2 X10E3/UL (ref 0.7–3.1)
LYMPHOCYTES NFR BLD AUTO: 37 %
MCH RBC QN AUTO: 28.3 PG (ref 26.6–33)
MCHC RBC AUTO-ENTMCNC: 32.7 G/DL (ref 31.5–35.7)
MCV RBC AUTO: 87 FL (ref 79–97)
MONOCYTES # BLD AUTO: 0.3 X10E3/UL (ref 0.1–0.9)
MONOCYTES NFR BLD AUTO: 5 %
NEUTROPHILS # BLD AUTO: 3.3 X10E3/UL (ref 1.4–7)
NEUTROPHILS NFR BLD AUTO: 54 %
PLATELET # BLD AUTO: 309 X10E3/UL (ref 150–379)
POTASSIUM SERPL-SCNC: 4.6 MMOL/L (ref 3.5–5.2)
PROT SERPL-MCNC: 6.9 G/DL (ref 6–8.5)
RBC # BLD AUTO: 4.66 X10E6/UL (ref 3.77–5.28)
SODIUM SERPL-SCNC: 141 MMOL/L (ref 134–144)
TRIGL SERPL-MCNC: 220 MG/DL (ref 0–149)
TSH SERPL DL<=0.005 MIU/L-ACNC: 2.43 UIU/ML (ref 0.45–4.5)
VLDLC SERPL CALC-MCNC: 44 MG/DL (ref 5–40)
WBC # BLD AUTO: 6 X10E3/UL (ref 3.4–10.8)

## 2018-10-26 NOTE — PROGRESS NOTES
Triglyceride Cholesterol reading is high. Decrease fried/fatty foods, red meat, butter, oils, alcohol (if you drink), and sugary foods, and increase cardio exercise. One liver enzyme continues to be elevated, but this is not in a concerning range, since we evaluated this with ultrasound. Overall, good lab report!   
Glenna Mckeon

## 2019-01-28 ENCOUNTER — HOSPITAL ENCOUNTER (OUTPATIENT)
Dept: MAMMOGRAPHY | Age: 44
Discharge: HOME | End: 2019-01-28
Payer: COMMERCIAL

## 2019-01-28 DIAGNOSIS — Z12.39 SCREENING BREAST EXAMINATION: ICD-10-CM

## 2019-01-28 PROCEDURE — 77067 SCR MAMMO BI INCL CAD: CPT

## 2019-04-01 ENCOUNTER — OFFICE VISIT (OUTPATIENT)
Dept: INTERNAL MEDICINE CLINIC | Age: 44
End: 2019-04-01

## 2019-04-01 VITALS
SYSTOLIC BLOOD PRESSURE: 164 MMHG | HEART RATE: 76 BPM | RESPIRATION RATE: 16 BRPM | WEIGHT: 222.8 LBS | HEIGHT: 64 IN | DIASTOLIC BLOOD PRESSURE: 88 MMHG | BODY MASS INDEX: 38.04 KG/M2 | OXYGEN SATURATION: 97 % | TEMPERATURE: 98.4 F

## 2019-04-01 DIAGNOSIS — E66.01 SEVERE OBESITY (HCC): Primary | ICD-10-CM

## 2019-04-01 DIAGNOSIS — M25.50 ARTHRALGIA, UNSPECIFIED JOINT: ICD-10-CM

## 2019-04-01 DIAGNOSIS — R53.83 FATIGUE, UNSPECIFIED TYPE: ICD-10-CM

## 2019-04-01 RX ORDER — BREXPIPRAZOLE 1 MG/1
1 TABLET ORAL DAILY
COMMUNITY
Start: 2019-03-14 | End: 2022-05-04 | Stop reason: DRUGHIGH

## 2019-04-01 RX ORDER — RANITIDINE HCL 75 MG
TABLET ORAL 2 TIMES DAILY
COMMUNITY
End: 2019-05-06

## 2019-04-01 NOTE — PROGRESS NOTES
Eloise Rosado is a 40 y.o. female  Chief Complaint   Patient presents with    Fatigue     for about one month        Health Maintenance Due   Topic Date Due    PAP AKA CERVICAL CYTOLOGY  06/15/2018     HPI  Fatigue x 1 month. Lab Results   Component Value Date/Time    TSH 2.430 10/23/2018 09:27 AM    TSH 2.730 03/04/2016 08:23 AM     Lab Results   Component Value Date/Time    WBC 6.0 10/23/2018 09:27 AM    HGB (POC) 13.1 05/23/2013 09:30 AM    HGB 13.2 10/23/2018 09:27 AM    HCT (POC) 40.0 05/23/2013 09:30 AM    HCT 40.4 10/23/2018 09:27 AM    PLATELET 713 95/90/5569 09:27 AM    MCV 87 10/23/2018 09:27 AM     Wt Readings from Last 3 Encounters:   04/01/19 222 lb 12.8 oz (101.1 kg)   10/23/18 207 lb 12.8 oz (94.3 kg)   01/30/18 202 lb 14.4 oz (92 kg)     Not exercising right now due to lack of motivation. Planning to start soon. Muscle & joint aches regularly last week   R hip flexors are tight - has talked to a PT friend and has stretches to start doing. Mattress is old and is considering replacing it. Seeing allergist in a few weeks. Taking Qnasal, allergy injections, Allegra. BP Readings from Last 9 Encounters:   04/01/19 (!) 146/93   10/23/18 122/78   01/30/18 134/68   12/13/17 136/88   12/12/17 141/88   10/20/17 (!) 136/98   10/02/17 136/86   08/17/17 131/79   07/19/17 121/78     BP elevated today and not improved at recheck. This is unusual for her. Pt notes she ate out last night. She thinks she can decrease salt in her diet. Review of Systems   Respiratory: Negative for shortness of breath. Cardiovascular: Negative for chest pain and palpitations. Neurological: Negative for dizziness, loss of consciousness and weakness. Psychiatric/Behavioral: Negative for depression. The patient is not nervous/anxious. Physical Exam   Constitutional: She is oriented to person, place, and time. She appears well-developed and well-nourished. No distress.    HENT:   Head: Normocephalic and atraumatic. Neck: Neck supple. No JVD present. No bruit bilateral carotid arteries. Cardiovascular: Normal rate, regular rhythm and normal heart sounds. Pulmonary/Chest: Effort normal and breath sounds normal. No respiratory distress. Musculoskeletal: She exhibits no edema. Neurological: She is alert and oriented to person, place, and time. Skin: Skin is warm and dry. Psychiatric: She has a normal mood and affect. Her behavior is normal. Judgment and thought content normal.   Nursing note and vitals reviewed. Diagnoses and all orders for this visit:    1. Severe obesity (HCC)  -     HEMOGLOBIN A1C W/O EAG    2. Arthralgia, unspecified joint  -     RPR  -     RHEUMATOID FACTOR, QL  -     DARLENE BY MULTIPLEX FLOW IA, QL  -     SED RATE (ESR)    3. Fatigue, unspecified type  -     METABOLIC PANEL, COMPREHENSIVE  -     CBC WITH AUTOMATED DIFF  -     TSH RFX ON ABNORMAL TO FREE T4  -     VITAMIN D, 25 HYDROXY    Discussed diet/exercise and encouraged weight loss.

## 2019-04-02 DIAGNOSIS — R74.8 ELEVATED LIVER ENZYMES: Primary | ICD-10-CM

## 2019-04-02 LAB
25(OH)D3+25(OH)D2 SERPL-MCNC: 62.9 NG/ML (ref 30–100)
ALBUMIN SERPL-MCNC: 4.2 G/DL (ref 3.5–5.5)
ALBUMIN/GLOB SERPL: 1.6 {RATIO} (ref 1.2–2.2)
ALP SERPL-CCNC: 86 IU/L (ref 39–117)
ALT SERPL-CCNC: 82 IU/L (ref 0–32)
ANA SER QL: NEGATIVE
AST SERPL-CCNC: 43 IU/L (ref 0–40)
BASOPHILS # BLD AUTO: 0 X10E3/UL (ref 0–0.2)
BASOPHILS NFR BLD AUTO: 1 %
BILIRUB SERPL-MCNC: 0.2 MG/DL (ref 0–1.2)
BUN SERPL-MCNC: 12 MG/DL (ref 6–24)
BUN/CREAT SERPL: 19 (ref 9–23)
CALCIUM SERPL-MCNC: 9.1 MG/DL (ref 8.7–10.2)
CHLORIDE SERPL-SCNC: 103 MMOL/L (ref 96–106)
CO2 SERPL-SCNC: 20 MMOL/L (ref 20–29)
CREAT SERPL-MCNC: 0.63 MG/DL (ref 0.57–1)
EOSINOPHIL # BLD AUTO: 0.2 X10E3/UL (ref 0–0.4)
EOSINOPHIL NFR BLD AUTO: 3 %
ERYTHROCYTE [DISTWIDTH] IN BLOOD BY AUTOMATED COUNT: 14 % (ref 12.3–15.4)
ERYTHROCYTE [SEDIMENTATION RATE] IN BLOOD BY WESTERGREN METHOD: 4 MM/HR (ref 0–32)
GLOBULIN SER CALC-MCNC: 2.7 G/DL (ref 1.5–4.5)
GLUCOSE SERPL-MCNC: 114 MG/DL (ref 65–99)
HBA1C MFR BLD: 6.2 % (ref 4.8–5.6)
HCT VFR BLD AUTO: 39.5 % (ref 34–46.6)
HGB BLD-MCNC: 13 G/DL (ref 11.1–15.9)
IMM GRANULOCYTES # BLD AUTO: 0 X10E3/UL (ref 0–0.1)
IMM GRANULOCYTES NFR BLD AUTO: 1 %
LYMPHOCYTES # BLD AUTO: 1.7 X10E3/UL (ref 0.7–3.1)
LYMPHOCYTES NFR BLD AUTO: 27 %
MCH RBC QN AUTO: 28.6 PG (ref 26.6–33)
MCHC RBC AUTO-ENTMCNC: 32.9 G/DL (ref 31.5–35.7)
MCV RBC AUTO: 87 FL (ref 79–97)
MONOCYTES # BLD AUTO: 0.3 X10E3/UL (ref 0.1–0.9)
MONOCYTES NFR BLD AUTO: 5 %
NEUTROPHILS # BLD AUTO: 3.9 X10E3/UL (ref 1.4–7)
NEUTROPHILS NFR BLD AUTO: 63 %
PLATELET # BLD AUTO: 268 X10E3/UL (ref 150–379)
POTASSIUM SERPL-SCNC: 4.6 MMOL/L (ref 3.5–5.2)
PROT SERPL-MCNC: 6.9 G/DL (ref 6–8.5)
RBC # BLD AUTO: 4.55 X10E6/UL (ref 3.77–5.28)
RHEUMATOID FACT SERPL-ACNC: <10 IU/ML (ref 0–13.9)
RPR SER QL: NON REACTIVE
SODIUM SERPL-SCNC: 143 MMOL/L (ref 134–144)
TSH SERPL DL<=0.005 MIU/L-ACNC: 2.4 UIU/ML (ref 0.45–4.5)
WBC # BLD AUTO: 6.1 X10E3/UL (ref 3.4–10.8)

## 2019-04-02 NOTE — PROGRESS NOTES
Clinton Dudley,   Blood Count, thyroid, kidney function, Vitamin D, rheumatology tests, and electrolytes are all normal. Good! Your A1c shows that your glucose (sugar) is in the \"prediabetic\" range. Decrease carbohydrates (bread, potatoes, rice, cereal, sugar, sweets), and increase cardio exercise. Try to avoid liquid calories (non-diet soda, gatorade, sweet tea, juice). Your Liver enzymes are higher than they were before. You have a history of fatty liver, and we did imaging of this last year, but because they continue to rise, I would like to repeat this test in 2-4 weeks along with Hepatitis testing to be sure we're not missing anything. I'll put an order in the system now for you to do this in 2-4 weeks. I know you said you were planning to work on diet & exercise, and if this is entirely related to fatty liver, those changes will help. If you are drinking alcohol regularly, please cut back on that as well. No need to fast for that repeat test, and no appointment necessary.      Batsheva Jj

## 2019-04-04 ENCOUNTER — TELEPHONE (OUTPATIENT)
Dept: INTERNAL MEDICINE CLINIC | Age: 44
End: 2019-04-04

## 2019-05-06 ENCOUNTER — OFFICE VISIT (OUTPATIENT)
Dept: INTERNAL MEDICINE CLINIC | Age: 44
End: 2019-05-06

## 2019-05-06 VITALS
DIASTOLIC BLOOD PRESSURE: 80 MMHG | RESPIRATION RATE: 20 BRPM | TEMPERATURE: 98.6 F | SYSTOLIC BLOOD PRESSURE: 116 MMHG | HEIGHT: 64 IN | OXYGEN SATURATION: 97 % | BODY MASS INDEX: 37.9 KG/M2 | WEIGHT: 222 LBS | HEART RATE: 84 BPM

## 2019-05-06 DIAGNOSIS — E04.2 MULTINODULAR GOITER: ICD-10-CM

## 2019-05-06 DIAGNOSIS — I10 ESSENTIAL HYPERTENSION: Primary | ICD-10-CM

## 2019-05-06 DIAGNOSIS — R74.8 ELEVATED LIVER ENZYMES: ICD-10-CM

## 2019-05-06 DIAGNOSIS — E66.01 SEVERE OBESITY (HCC): ICD-10-CM

## 2019-05-06 RX ORDER — ALBUTEROL SULFATE 90 UG/1
AEROSOL, METERED RESPIRATORY (INHALATION)
Refills: 99 | COMMUNITY
Start: 2019-04-12 | End: 2021-07-26

## 2019-05-06 RX ORDER — FEXOFENADINE HCL AND PSEUDOEPHEDRINE HCI 180; 240 MG/1; MG/1
1 TABLET, EXTENDED RELEASE ORAL DAILY
COMMUNITY
End: 2022-09-08

## 2019-05-06 RX ORDER — FLUOXETINE HYDROCHLORIDE 20 MG/1
20 CAPSULE ORAL
COMMUNITY
Start: 2019-03-12 | End: 2020-10-14 | Stop reason: ALTCHOICE

## 2019-05-06 NOTE — PROGRESS NOTES
Chong Pacheco is a 40 y.o. female  Chief Complaint   Patient presents with    Hypertension        Health Maintenance Due   Topic Date Due    PAP AKA CERVICAL CYTOLOGY  06/15/2018       HPI  HTN Follow up - BP remains slightly uncontrolled at first check today:  BP Readings from Last 3 Encounters:   05/06/19 142/84   04/01/19 164/88   10/23/18 122/78     Controlled at recheck today:   BP Readings from Last 1 Encounters:   05/06/19 116/80     Currently taking:   Key CAD CHF Meds     Patient is on no cardiovascular meds. Pre-Diabetes:   Lab Results   Component Value Date/Time    Hemoglobin A1c 6.2 (H) 04/01/2019 08:57 AM     Wt Readings from Last 3 Encounters:   05/06/19 222 lb (100.7 kg)   04/01/19 222 lb 12.8 oz (101.1 kg)   10/23/18 207 lb 12.8 oz (94.3 kg)   No change in weight over the past 1 month. Started doing planks at night before going to bed, and avoiding desserts before bed. Elevated liver enzymes - had imaging done last year that showed fatty liver. Lab Results   Component Value Date/Time    ALT (SGPT) 82 (H) 04/01/2019 08:57 AM    AST (SGOT) 43 (H) 04/01/2019 08:57 AM    GGT 51 12/19/2017 08:29 AM    Alk. phosphatase 86 04/01/2019 08:57 AM    Bilirubin, direct 0.08 06/15/2015 12:00 AM    Bilirubin, total 0.2 04/01/2019 08:57 AM     Lab order in system to repeat today. Review of Systems   Respiratory: Negative for shortness of breath. Cardiovascular: Negative for chest pain and palpitations. Neurological: Negative for dizziness, loss of consciousness and weakness. Physical Exam   Constitutional: She is oriented to person, place, and time. She appears well-developed and well-nourished. No distress. HENT:   Head: Normocephalic and atraumatic. Neck: Neck supple. No JVD present. Thyromegaly present. No bruit bilateral carotid arteries. Cardiovascular: Normal rate, regular rhythm and normal heart sounds.    Pulmonary/Chest: Effort normal and breath sounds normal. No respiratory distress. Musculoskeletal: She exhibits no edema. Neurological: She is alert and oriented to person, place, and time. Skin: Skin is warm and dry. Psychiatric: She has a normal mood and affect. Her behavior is normal. Judgment and thought content normal.   Nursing note and vitals reviewed. Diagnoses and all orders for this visit:    1. Essential hypertension  Currently diet controlled. Monitor 2-3 times per week and follow up if BPs start ranging above 140/90 regularly. 2. Severe obesity ([address]  Discussed diet/exercise and options for/importance of losing weight. 3. Multinodular goiter  Follow up with endocrine.      4. Liver enzyme elevation - labs today

## 2019-05-07 LAB
ALBUMIN SERPL-MCNC: 4.2 G/DL (ref 3.5–5.5)
ALP SERPL-CCNC: 84 IU/L (ref 39–117)
ALT SERPL-CCNC: 53 IU/L (ref 0–32)
AST SERPL-CCNC: 30 IU/L (ref 0–40)
BILIRUB DIRECT SERPL-MCNC: 0.08 MG/DL (ref 0–0.4)
BILIRUB SERPL-MCNC: 0.3 MG/DL (ref 0–1.2)
HBV SURFACE AG SERPL QL IA: NEGATIVE
HCV AB S/CO SERPL IA: <0.1 S/CO RATIO (ref 0–0.9)
HCV AB SERPL QL IA: NORMAL
PROT SERPL-MCNC: 6.4 G/DL (ref 6–8.5)

## 2019-08-09 DIAGNOSIS — R53.83 FATIGUE, UNSPECIFIED TYPE: Primary | ICD-10-CM

## 2019-12-09 DIAGNOSIS — N39.0 CHRONIC UTI: ICD-10-CM

## 2019-12-10 RX ORDER — NITROFURANTOIN 25; 75 MG/1; MG/1
CAPSULE ORAL
Qty: 30 CAP | Refills: 2 | Status: SHIPPED | OUTPATIENT
Start: 2019-12-10 | End: 2020-10-14 | Stop reason: ALTCHOICE

## 2020-02-03 ENCOUNTER — HOSPITAL ENCOUNTER (OUTPATIENT)
Dept: MAMMOGRAPHY | Age: 45
Discharge: HOME | End: 2020-02-03
Attending: PHYSICIAN ASSISTANT
Payer: COMMERCIAL

## 2020-02-03 DIAGNOSIS — Z12.31 ENCOUNTER FOR SCREENING MAMMOGRAM FOR MALIGNANT NEOPLASM OF BREAST: ICD-10-CM

## 2020-02-03 PROCEDURE — 77067 SCR MAMMO BI INCL CAD: CPT

## 2020-04-21 ENCOUNTER — E-VISIT (OUTPATIENT)
Dept: INTERNAL MEDICINE CLINIC | Facility: CLINIC | Age: 45
End: 2020-04-21

## 2020-04-21 DIAGNOSIS — B35.4 TINEA CORPORIS: Primary | ICD-10-CM

## 2020-04-21 RX ORDER — MICONAZOLE NITRATE 2 %
POWDER (GRAM) TOPICAL 2 TIMES DAILY
Qty: 85 G | Refills: 1 | Status: SHIPPED | OUTPATIENT
Start: 2020-04-21 | End: 2020-10-14 | Stop reason: ALTCHOICE

## 2020-04-21 RX ORDER — FLUCONAZOLE 150 MG/1
150 TABLET ORAL
Qty: 4 TAB | Refills: 0 | Status: SHIPPED | OUTPATIENT
Start: 2020-04-21 | End: 2020-05-13

## 2020-09-30 ENCOUNTER — PATIENT MESSAGE (OUTPATIENT)
Dept: INTERNAL MEDICINE CLINIC | Facility: CLINIC | Age: 45
End: 2020-09-30

## 2020-09-30 DIAGNOSIS — Z00.00 ANNUAL PHYSICAL EXAM: ICD-10-CM

## 2020-09-30 DIAGNOSIS — R53.83 FATIGUE, UNSPECIFIED TYPE: Primary | ICD-10-CM

## 2020-10-02 DIAGNOSIS — Z00.00 ANNUAL PHYSICAL EXAM: ICD-10-CM

## 2020-10-02 NOTE — TELEPHONE ENCOUNTER
From: Marlyn Farr  To: Zully Santa PA-C  Sent: 9/30/2020 3:23 PM EDT  Subject: Non-Urgent Medical Question    I would like to schedule an appointment for my annual physical, but before I do that, I wanted to find out if it was possible to get my bloodwork done in advance so that we will have the results for the appointment? I am still feeling fatigue although it has slightly improved. My endo. suggested I get my iron checked, & I am wondering if I should be checked for diabetes. I have been experienceing dry mouth, thirst, fatigue, increased urination, weight loss, occasional blurred vision. It could be related to the side effects of my antidepressant, dry mouth, equals drinking more water, which in turn results in increased urination. Can I get my blood work then schedule the appointment?

## 2020-10-03 DIAGNOSIS — R53.83 FATIGUE, UNSPECIFIED TYPE: ICD-10-CM

## 2020-10-03 DIAGNOSIS — Z00.00 ANNUAL PHYSICAL EXAM: ICD-10-CM

## 2020-10-06 LAB
ALBUMIN SERPL-MCNC: 4.4 G/DL (ref 3.8–4.8)
ALBUMIN/GLOB SERPL: 1.8 {RATIO} (ref 1.2–2.2)
ALP SERPL-CCNC: 127 IU/L (ref 39–117)
ALT SERPL-CCNC: 111 IU/L (ref 0–32)
APPEARANCE UR: CLEAR
AST SERPL-CCNC: 69 IU/L (ref 0–40)
BACTERIA #/AREA URNS HPF: NORMAL /[HPF]
BASOPHILS # BLD AUTO: 0 X10E3/UL (ref 0–0.2)
BASOPHILS NFR BLD AUTO: 1 %
BILIRUB SERPL-MCNC: 0.5 MG/DL (ref 0–1.2)
BILIRUB UR QL STRIP: NEGATIVE
BUN SERPL-MCNC: 9 MG/DL (ref 6–24)
BUN/CREAT SERPL: 15 (ref 9–23)
CALCIUM SERPL-MCNC: 9.3 MG/DL (ref 8.7–10.2)
CASTS URNS QL MICRO: NORMAL /LPF
CHLORIDE SERPL-SCNC: 101 MMOL/L (ref 96–106)
CHOLEST SERPL-MCNC: 175 MG/DL (ref 100–199)
CO2 SERPL-SCNC: 23 MMOL/L (ref 20–29)
COLOR UR: YELLOW
CREAT SERPL-MCNC: 0.62 MG/DL (ref 0.57–1)
EOSINOPHIL # BLD AUTO: 0.1 X10E3/UL (ref 0–0.4)
EOSINOPHIL NFR BLD AUTO: 2 %
EPI CELLS #/AREA URNS HPF: NORMAL /HPF (ref 0–10)
ERYTHROCYTE [DISTWIDTH] IN BLOOD BY AUTOMATED COUNT: 12.5 % (ref 11.7–15.4)
EST. AVERAGE GLUCOSE BLD GHB EST-MCNC: 301 MG/DL
GLOBULIN SER CALC-MCNC: 2.4 G/DL (ref 1.5–4.5)
GLUCOSE SERPL-MCNC: 332 MG/DL (ref 65–99)
GLUCOSE UR QL: ABNORMAL
HBA1C MFR BLD: 12.1 % (ref 4.8–5.6)
HCT VFR BLD AUTO: 42.7 % (ref 34–46.6)
HDLC SERPL-MCNC: 47 MG/DL
HGB BLD-MCNC: 14.2 G/DL (ref 11.1–15.9)
HGB UR QL STRIP: ABNORMAL
IMM GRANULOCYTES # BLD AUTO: 0 X10E3/UL (ref 0–0.1)
IMM GRANULOCYTES NFR BLD AUTO: 0 %
INTERPRETATION, 910389: NORMAL
IRON SATN MFR SERPL: 27 % (ref 15–55)
IRON SERPL-MCNC: 95 UG/DL (ref 27–159)
KETONES UR QL STRIP: ABNORMAL
LDLC SERPL CALC-MCNC: 88 MG/DL (ref 0–99)
LEUKOCYTE ESTERASE UR QL STRIP: NEGATIVE
LYMPHOCYTES # BLD AUTO: 2.2 X10E3/UL (ref 0.7–3.1)
LYMPHOCYTES NFR BLD AUTO: 40 %
Lab: NORMAL
MCH RBC QN AUTO: 27.8 PG (ref 26.6–33)
MCHC RBC AUTO-ENTMCNC: 33.3 G/DL (ref 31.5–35.7)
MCV RBC AUTO: 84 FL (ref 79–97)
MICRO URNS: ABNORMAL
MONOCYTES # BLD AUTO: 0.3 X10E3/UL (ref 0.1–0.9)
MONOCYTES NFR BLD AUTO: 6 %
MUCOUS THREADS URNS QL MICRO: PRESENT
NEUTROPHILS # BLD AUTO: 2.8 X10E3/UL (ref 1.4–7)
NEUTROPHILS NFR BLD AUTO: 51 %
NITRITE UR QL STRIP: NEGATIVE
PH UR STRIP: 6 [PH] (ref 5–7.5)
PLATELET # BLD AUTO: 269 X10E3/UL (ref 150–450)
POTASSIUM SERPL-SCNC: 4.5 MMOL/L (ref 3.5–5.2)
PROT SERPL-MCNC: 6.8 G/DL (ref 6–8.5)
PROT UR QL STRIP: NEGATIVE
RBC # BLD AUTO: 5.1 X10E6/UL (ref 3.77–5.28)
RBC #/AREA URNS HPF: NORMAL /HPF (ref 0–2)
SODIUM SERPL-SCNC: 137 MMOL/L (ref 134–144)
SP GR UR: >=1.03 (ref 1–1.03)
TIBC SERPL-MCNC: 351 UG/DL (ref 250–450)
TRIGL SERPL-MCNC: 243 MG/DL (ref 0–149)
TSH SERPL DL<=0.005 MIU/L-ACNC: 1.4 UIU/ML (ref 0.45–4.5)
UIBC SERPL-MCNC: 256 UG/DL (ref 131–425)
URINALYSIS REFLEX, 377202: ABNORMAL
UROBILINOGEN UR STRIP-MCNC: 0.2 MG/DL (ref 0.2–1)
VLDLC SERPL CALC-MCNC: 40 MG/DL (ref 5–40)
WBC # BLD AUTO: 5.6 X10E3/UL (ref 3.4–10.8)
WBC #/AREA URNS HPF: NORMAL /HPF (ref 0–5)

## 2020-10-06 NOTE — PROGRESS NOTES
Writer contacted patient to inform of lab results per Claudia Sanders, patient verbalized understanding, appt made per Claudia Sanders.

## 2020-10-06 NOTE — PROGRESS NOTES
Your diabetes is now severe and we need to start medication asap. Liver enzymes are very high, and your cholesterol is affected by the diabetes. My nurse will call you about this.      Thyroid, kidney function, blood count and iron level are normal.

## 2020-10-14 ENCOUNTER — OFFICE VISIT (OUTPATIENT)
Dept: INTERNAL MEDICINE CLINIC | Age: 45
End: 2020-10-14
Payer: COMMERCIAL

## 2020-10-14 VITALS
HEART RATE: 69 BPM | OXYGEN SATURATION: 97 % | BODY MASS INDEX: 35.13 KG/M2 | HEIGHT: 64 IN | RESPIRATION RATE: 16 BRPM | DIASTOLIC BLOOD PRESSURE: 80 MMHG | TEMPERATURE: 95.4 F | SYSTOLIC BLOOD PRESSURE: 132 MMHG | WEIGHT: 205.8 LBS

## 2020-10-14 DIAGNOSIS — Z23 NEEDS FLU SHOT: ICD-10-CM

## 2020-10-14 DIAGNOSIS — N76.0 ACUTE VAGINITIS: ICD-10-CM

## 2020-10-14 DIAGNOSIS — Z00.00 ANNUAL PHYSICAL EXAM: Primary | ICD-10-CM

## 2020-10-14 DIAGNOSIS — M25.511 ACUTE PAIN OF RIGHT SHOULDER: ICD-10-CM

## 2020-10-14 DIAGNOSIS — E11.9 TYPE 2 DIABETES MELLITUS WITHOUT COMPLICATION, WITHOUT LONG-TERM CURRENT USE OF INSULIN (HCC): ICD-10-CM

## 2020-10-14 DIAGNOSIS — R74.8 ELEVATED LIVER ENZYMES: ICD-10-CM

## 2020-10-14 PROCEDURE — 99214 OFFICE O/P EST MOD 30 MIN: CPT | Performed by: PHYSICIAN ASSISTANT

## 2020-10-14 PROCEDURE — 90686 IIV4 VACC NO PRSV 0.5 ML IM: CPT | Performed by: PHYSICIAN ASSISTANT

## 2020-10-14 PROCEDURE — 90471 IMMUNIZATION ADMIN: CPT | Performed by: PHYSICIAN ASSISTANT

## 2020-10-14 PROCEDURE — 99396 PREV VISIT EST AGE 40-64: CPT | Performed by: PHYSICIAN ASSISTANT

## 2020-10-14 RX ORDER — VORTIOXETINE 10 MG/1
10 TABLET, FILM COATED ORAL DAILY
COMMUNITY
Start: 2020-10-13

## 2020-10-14 RX ORDER — METFORMIN HYDROCHLORIDE 500 MG/1
500 TABLET ORAL 2 TIMES DAILY WITH MEALS
Qty: 60 TAB | Refills: 5 | Status: SHIPPED | OUTPATIENT
Start: 2020-10-14 | End: 2021-04-02

## 2020-10-14 RX ORDER — FLUCONAZOLE 150 MG/1
150 TABLET ORAL
Qty: 2 TAB | Refills: 0 | Status: SHIPPED | OUTPATIENT
Start: 2020-10-14 | End: 2020-10-22

## 2020-10-14 NOTE — PROGRESS NOTES
Chief Complaint   Patient presents with    Physical    Immunization/Injection     flu shot       1. Have you been to the ER, urgent care clinic since your last visit? Hospitalized since your last visit? No    2. Have you seen or consulted any other health care providers outside of the [address] [place] [address] since your last visit? Include any pap smears or colon screening.  No

## 2020-10-14 NOTE — PATIENT INSTRUCTIONS
Vaccine Information Statement    Influenza (Flu) Vaccine (Inactivated or Recombinant): What You Need to Know    Many Vaccine Information Statements are available in [place] and other languages. See www.immunize.org/vis  Hojas de información sobre vacunas están disponibles en español y en muchos otros idiomas. Visite www.immunize.org/vis    1. Why get vaccinated? Influenza vaccine can prevent influenza (flu). Flu is a contagious disease that spreads around the [facility] every year, usually between October and May. Anyone can get the flu, but it is more dangerous for some people. Infants and young children, people 72years of age and older, pregnant women, and people with certain health conditions or a weakened immune system are at greatest risk of flu complications. Pneumonia, bronchitis, sinus infections and ear infections are examples of flu-related complications. If you have a medical condition, such as heart disease, cancer or diabetes, flu can make it worse. Flu can cause fever and chills, sore throat, muscle aches, fatigue, cough, headache, and runny or stuffy nose. Some people may have vomiting and diarrhea, though this is more common in children than adults. Each year thousands of people in the [facility] die from flu, and many more are hospitalized. Flu vaccine prevents millions of illnesses and flu-related visits to the doctor each year. 2. Influenza vaccines     CDC recommends everyone 10months of age and older get vaccinated every flu season. Children 6 months through 6years of age may need 2 doses during a single flu season. Everyone else needs only 1 dose each flu season. It takes about 2 weeks for protection to develop after vaccination. There are many flu viruses, and they are always changing. Each year a new flu vaccine is made to protect against three or four viruses that are likely to cause disease in the upcoming flu season.  Even when the vaccine doesnt exactly match these viruses, it may still provide some protection. Influenza vaccine does not cause flu. Influenza vaccine may be given at the same time as other vaccines. 3. Talk with your health care provider    Tell your vaccine provider if the person getting the vaccine:   Has had an allergic reaction after a previous dose of influenza vaccine, or has any severe, life-threatening allergies.  Has ever had Guillain-Barré Syndrome (also called GBS). In some cases, your health care provider may decide to postpone influenza vaccination to a future visit. People with minor illnesses, such as a cold, may be vaccinated. People who are moderately or severely ill should usually wait until they recover before getting influenza vaccine. Your health care provider can give you more information. 4. Risks of a reaction     Soreness, redness, and swelling where shot is given, fever, muscle aches, and headache can happen after influenza vaccine.  There may be a very small increased risk of Guillain-Barré Syndrome (GBS) after inactivated influenza vaccine (the flu shot). [address] [place] [address] children who get the flu shot along with pneumococcal vaccine (PCV13), and/or DTaP vaccine at the same time might be slightly more likely to have a seizure caused by fever. Tell your health care provider if a child who is getting flu vaccine has ever had a seizure. People sometimes faint after medical procedures, including vaccination. Tell your provider if you feel dizzy or have vision changes or ringing in the ears. As with any medicine, there is a very remote chance of a vaccine causing a severe allergic reaction, other serious injury, or death. 5. What if there is a serious problem? An allergic reaction could occur after the vaccinated person leaves the clinic.  If you see signs of a severe allergic reaction (hives, swelling of the face and throat, difficulty breathing, a fast heartbeat, dizziness, or weakness), call 9-1-1 and get the person to the nearest hospital.    For other signs that concern you, call your health care provider. Adverse reactions should be reported to the Vaccine Adverse Event Reporting System (VAERS). Your health care provider will usually file this report, or you can do it yourself. Visit the VAERS website at www.vaers. [facility].gov or call 8-106.463.5305. VAERS is only for reporting reactions, and VAERS staff do not give medical advice. 6. The National Vaccine Injury Compensation Program    The [facility] Vaccine Injury Compensation Program (VICP) is a federal program that was created to compensate people who may have been injured by certain vaccines. Visit the VICP website at www.[facility].gov/vaccinecompensation or call 4-681.552.3973 to learn about the program and about filing a claim. There is a time limit to file a claim for compensation. 7. How can I learn more?  Ask your health care provider.  Call your local or state health department.  Contact the Centers for Disease Control and Prevention (CDC):  - Call 7-743.242.7363 (1-800-CDC-INFO) or  - Visit CDCs influenza website at www.cdc.gov/flu    Vaccine Information Statement (Interim)  Inactivated Influenza Vaccine   8/15/2019  42 STACEY Charlesin [id number]   Department of Health and Human Services  Centers for Disease Control and Prevention    Office Use Only      Vaccine Information Statement    Influenza (Flu) Vaccine (Inactivated or Recombinant): What You Need to Know    Many Vaccine Information Statements are available in [place] and other languages. See www.immunize.org/vis  Hojas de información sobre vacunas están disponibles en español y en muchos otros idiomas. Visite www.immunize.org/vis    1. Why get vaccinated? Influenza vaccine can prevent influenza (flu). Flu is a contagious disease that spreads around the [place] every year, usually between October and May. Anyone can get the flu, but it is more dangerous for some people. Infants and young children, people 72years of age and older, pregnant women, and people with certain health conditions or a weakened immune system are at greatest risk of flu complications. Pneumonia, bronchitis, sinus infections and ear infections are examples of flu-related complications. If you have a medical condition, such as heart disease, cancer or diabetes, flu can make it worse. Flu can cause fever and chills, sore throat, muscle aches, fatigue, cough, headache, and runny or stuffy nose. Some people may have vomiting and diarrhea, though this is more common in children than adults. Each year thousands of people in the [facility] die from flu, and many more are hospitalized. Flu vaccine prevents millions of illnesses and flu-related visits to the doctor each year. 2. Influenza vaccines     CDC recommends everyone 10months of age and older get vaccinated every flu season. Children 6 months through 6years of age may need 2 doses during a single flu season. Everyone else needs only 1 dose each flu season. It takes about 2 weeks for protection to develop after vaccination. There are many flu viruses, and they are always changing. Each year a new flu vaccine is made to protect against three or four viruses that are likely to cause disease in the upcoming flu season. Even when the vaccine doesnt exactly match these viruses, it may still provide some protection. Influenza vaccine does not cause flu. Influenza vaccine may be given at the same time as other vaccines. 3. Talk with your health care provider    Tell your vaccine provider if the person getting the vaccine:   Has had an allergic reaction after a previous dose of influenza vaccine, or has any severe, life-threatening allergies.  Has ever had Guillain-Barré Syndrome (also called GBS). In some cases, your health care provider may decide to postpone influenza vaccination to a future visit.     People with minor illnesses, such as a cold, may be vaccinated. People who are moderately or severely ill should usually wait until they recover before getting influenza vaccine. Your health care provider can give you more information. 4. Risks of a reaction     Soreness, redness, and swelling where shot is given, fever, muscle aches, and headache can happen after influenza vaccine.  There may be a very small increased risk of Guillain-Barré Syndrome (GBS) after inactivated influenza vaccine (the flu shot). [place] children who get the flu shot along with pneumococcal vaccine (PCV13), and/or DTaP vaccine at the same time might be slightly more likely to have a seizure caused by fever. Tell your health care provider if a child who is getting flu vaccine has ever had a seizure. People sometimes faint after medical procedures, including vaccination. Tell your provider if you feel dizzy or have vision changes or ringing in the ears. As with any medicine, there is a very remote chance of a vaccine causing a severe allergic reaction, other serious injury, or death. 5. What if there is a serious problem? An allergic reaction could occur after the vaccinated person leaves the clinic. If you see signs of a severe allergic reaction (hives, swelling of the face and throat, difficulty breathing, a fast heartbeat, dizziness, or weakness), call 9-1-1 and get the person to the nearest hospital.    For other signs that concern you, call your health care provider. Adverse reactions should be reported to the Vaccine Adverse Event Reporting System (VAERS). Your health care provider will usually file this report, or you can do it yourself. Visit the VAERS website at www.vaers. hhs.gov or call 6-312.374.6448. VAERS is only for reporting reactions, and VAERS staff do not give medical advice.     6. The National Vaccine Injury Compensation Program    The [place] Vaccine Injury Compensation Program (VICP) is a federal program that was created to compensate people who may have been injured by certain vaccines. Visit the VICP website at www.[facility].gov/vaccinecompensation or call 9-573.609.6666 to learn about the program and about filing a claim. There is a time limit to file a claim for compensation. 7. How can I learn more?  Ask your health care provider.  Call your local or state health department.  Contact the Centers for Disease Control and Prevention (CDC):  - Call 3-666.140.7234 (1-800-CDC-INFO) or  - Visit CDCs influenza website at www.cdc.gov/flu    Vaccine Information Statement (Interim)  Inactivated Influenza Vaccine   8/15/2019  42 STACEY Zhang [id number]   Department of Health and Human Services  Centers for Disease Control and Prevention    Office Use Only

## 2020-10-14 NOTE — PROGRESS NOTES
Mike Tipton is a 39 y.o. female  Chief Complaint   Patient presents with    Physical    Immunization/Injection     flu shot     Visit Vitals  /80   Pulse 69   Temp (!) 95.4 °F (35.2 °C) (Temporal)   Resp 16   Ht 5' 4\" (1.626 m)   Wt 205 lb 12.8 oz (93.4 kg)   SpO2 97%   BMI 35.33 kg/m²      Health Maintenance Due   Topic Date Due    Foot Exam Q1  03/31/1985    MICROALBUMIN Q1  03/31/1985    Eye Exam Retinal or Dilated  03/31/1985    PAP AKA CERVICAL CYTOLOGY  06/15/2018    Flu Vaccine (1) 09/01/2020       HPI  CE today     R shoulder pain since March - keeping work bag in back seat. Injured while trying to pull bag. Vaginal Itching - Using Summer's Sallie Wash to wash genital area. No recent change. Tried Monistat 7 last week helped somewhat but not completely. Would like Diflucan. Hemorrhoids - Prep H is helping. LFTs high - not drinking alcohol regularly. No change in sexual partners. Had Hepatitis testing done 2019. LFTs have increased in the past with poor diet and improved with healthy diet & weight loss. Lab Results   Component Value Date/Time    ALT (SGPT) 111 (H) 10/05/2020 10:35 AM    AST (SGOT) 69 (H) 10/05/2020 10:35 AM    GGT 51 12/19/2017 08:29 AM    Alk. phosphatase 127 (H) 10/05/2020 10:35 AM    Bilirubin, direct 0.08 05/06/2019 08:54 AM    Bilirubin, total 0.5 10/05/2020 10:35 AM     DM II - previously prediabetes. Now diabetes. Not eating low carb diet.    A1c in 4/2019: 6.2    Lab Results   Component Value Date/Time    Hemoglobin A1c 12.1 (H) 10/05/2020 10:35 AM     Lab Results   Component Value Date/Time    Cholesterol, total 175 10/05/2020 10:35 AM    HDL Cholesterol 47 10/05/2020 10:35 AM    LDL, calculated 65 10/23/2018 09:27 AM    LDL Chol Calc (NIH) 88 10/05/2020 10:35 AM    VLDL, calculated 44 (H) 10/23/2018 09:27 AM    VLDL Cholesterol Timmy 40 10/05/2020 10:35 AM    Triglyceride 243 (H) 10/05/2020 10:35 AM     Currently taking:   Key Antihyperglycemic Medications     Patient is on no antihyperglycemic meds. Wt Readings from Last 3 Encounters:   05/06/19 222 lb (100.7 kg)   04/01/19 222 lb 12.8 oz (101.1 kg)   10/23/18 207 lb 12.8 oz (94.3 kg)     HTN Follow up - BP controlled at recheck:  No flowsheet data found. BP Readings from Last 3 Encounters:   10/14/20 132/80   05/06/19 116/80   04/01/19 164/88     Currently taking:   Key CAD CHF Meds     Patient is on no cardiovascular meds. ROS  Review of Systems   Constitutional: Negative for fever and weight loss. HENT: Negative for congestion, hearing loss and sore throat. Eyes: Negative for blurred vision. Respiratory: Negative for cough and shortness of breath. Cardiovascular: Negative for chest pain, palpitations and leg swelling. Gastrointestinal: Negative for abdominal pain, blood in stool, constipation, diarrhea, heartburn, melena, nausea and vomiting. Genitourinary: Positive for frequency and urgency. Negative for dysuria and hematuria. Musculoskeletal: Negative for back pain, joint pain and neck pain. Neurological: Negative for dizziness, seizures, loss of consciousness, weakness and headaches. Endo/Heme/Allergies: Positive for polydipsia. Negative for environmental allergies. Psychiatric/Behavioral: Negative for depression and substance abuse. The patient is not nervous/anxious and does not have insomnia. EXAM  Physical Exam  Vitals signs and nursing note reviewed. Constitutional:       General: She is not in acute distress. Appearance: She is well-developed. HENT:      Head: Normocephalic and atraumatic. Right Ear: External ear normal.      Left Ear: External ear normal.      Nose: Nose normal.   Eyes:      Conjunctiva/sclera: Conjunctivae normal.   Neck:      Musculoskeletal: Neck supple. Thyroid: No thyromegaly. Vascular: No JVD. Cardiovascular:      Rate and Rhythm: Normal rate and regular rhythm. Heart sounds: Normal heart sounds. Pulmonary:      Effort: Pulmonary effort is normal. No respiratory distress. Breath sounds: Normal breath sounds. Abdominal:      General: Bowel sounds are normal. There is no distension. Palpations: Abdomen is soft. There is no mass. Tenderness: There is no abdominal tenderness. There is no guarding or rebound. Musculoskeletal: Normal range of motion. General: No swelling, tenderness or deformity. Right lower leg: No edema. Left lower leg: No edema. Lymphadenopathy:      Cervical: No cervical adenopathy. Skin:     General: Skin is warm and dry. Neurological:      Mental Status: She is alert and oriented to person, place, and time. Psychiatric:         Behavior: Behavior normal.         Thought Content: Thought content normal.         Judgment: Judgment normal.       ASSESSMENT/PLAN  1. Type 2 diabetes mellitus without complication, without long-term current use of insulin ([facility])  - MICROALBUMIN, UR, RAND W/ MICROALB/CREAT RATIO; Future  -  DIABETES FOOT EXAM; Future  Start metFORMIN (GLUCOPHAGE) 500 mg tablet; Take 1 Tab by mouth two (2) times daily (with meals). Dispense: 60 Tab; Refill: 5    2. Annual physical exam  Reviewed labs    3. Needs flu shot  - INFLUENZA VIRUS VAC QUAD,SPLIT,PRESV FREE SYRINGE IM    4. Elevated liver enzymes  Avoid alcohol. Low carb, low fat diet. - METABOLIC PANEL, COMPREHENSIVE; Future    5. Acute vaginitis  Start with repeat OTC 7 day Monistat. INI, then ok to take fluconazole (DIFLUCAN) 150 mg tablet; Take 1 Tab by mouth every seven (7) days for 2 doses. Take one by mouth weekly. Dispense: 2 Tab; Refill: 0  Ideally, would not take Diflucan due to elevated LFTs.      6. Acute pain of right shoulder  - REFERRAL TO ORTHOPEDIC SURGERY

## 2020-11-25 LAB
ALBUMIN SERPL-MCNC: 4.4 G/DL (ref 3.8–4.8)
ALBUMIN/CREAT UR: 12 MG/G CREAT (ref 0–29)
ALBUMIN/GLOB SERPL: 2 {RATIO} (ref 1.2–2.2)
ALP SERPL-CCNC: 93 IU/L (ref 39–117)
ALT SERPL-CCNC: 78 IU/L (ref 0–32)
AST SERPL-CCNC: 49 IU/L (ref 0–40)
BILIRUB SERPL-MCNC: 0.2 MG/DL (ref 0–1.2)
BUN SERPL-MCNC: 12 MG/DL (ref 6–24)
BUN/CREAT SERPL: 18 (ref 9–23)
CALCIUM SERPL-MCNC: 9.3 MG/DL (ref 8.7–10.2)
CHLORIDE SERPL-SCNC: 105 MMOL/L (ref 96–106)
CO2 SERPL-SCNC: 18 MMOL/L (ref 20–29)
CREAT SERPL-MCNC: 0.65 MG/DL (ref 0.57–1)
CREAT UR-MCNC: 159.7 MG/DL
GLOBULIN SER CALC-MCNC: 2.2 G/DL (ref 1.5–4.5)
GLUCOSE SERPL-MCNC: 171 MG/DL (ref 65–99)
MICROALBUMIN UR-MCNC: 18.5 UG/ML
POTASSIUM SERPL-SCNC: 4.7 MMOL/L (ref 3.5–5.2)
PROT SERPL-MCNC: 6.6 G/DL (ref 6–8.5)
SODIUM SERPL-SCNC: 140 MMOL/L (ref 134–144)

## 2020-12-15 ENCOUNTER — VIRTUAL VISIT (OUTPATIENT)
Dept: INTERNAL MEDICINE CLINIC | Age: 45
End: 2020-12-15
Payer: COMMERCIAL

## 2020-12-15 DIAGNOSIS — E11.9 TYPE 2 DIABETES MELLITUS WITHOUT COMPLICATION, WITHOUT LONG-TERM CURRENT USE OF INSULIN (HCC): Primary | ICD-10-CM

## 2020-12-15 PROCEDURE — 99213 OFFICE O/P EST LOW 20 MIN: CPT | Performed by: PHYSICIAN ASSISTANT

## 2020-12-15 RX ORDER — HYDROGEN PEROXIDE 3 %
20 SOLUTION, NON-ORAL MISCELLANEOUS DAILY
COMMUNITY

## 2020-12-15 RX ORDER — NAPROXEN 500 MG/1
TABLET ORAL
COMMUNITY
Start: 2020-11-29 | End: 2021-05-18

## 2020-12-15 RX ORDER — ASPIRIN 81 MG/1
81 TABLET ORAL DAILY
COMMUNITY

## 2020-12-15 NOTE — PROGRESS NOTES
Macy Clark is a 39 y.o. female who was seen by synchronous (real-time) audio-video technology on 12/15/2020    Consent: Macy Clark, who was seen by synchronous (real-time) audio-video technology, and/or her healthcare decision maker, is aware that this patient-initiated, Telehealth encounter on 12/15/2020 is a billable service, with coverage as determined by her insurance carrier. She is aware that she may receive a bill and has provided verbal consent to proceed: YES    Assessment & Plan:   Diagnoses and all orders for this visit:    1. Type 2 diabetes mellitus without complication, without long-term current use of insulin ([facility])  -     METABOLIC PANEL, COMPREHENSIVE; Future  -     HEMOGLOBIN A1C WITH EAG; Future  -     REFERRAL TO DIETITIAN  Labs in January. Encouraged exercise and low carb diet. Subjective: Macy Clark is a 39 y.o. female who was seen for Diabetes (2 months follow up)    DM II - dx at last visit. Started Metformin. Lab Results   Component Value Date/Time    Hemoglobin A1c 12.1 (H) 10/05/2020 10:35 AM     Lab Results   Component Value Date/Time    Cholesterol, total 175 10/05/2020 10:35 AM    HDL Cholesterol 47 10/05/2020 10:35 AM    LDL, calculated 65 10/23/2018 09:27 AM    LDL Chol Calc (NIH) 88 10/05/2020 10:35 AM    VLDL, calculated 44 (H) 10/23/2018 09:27 AM    VLDL Cholesterol Timmy 40 10/05/2020 10:35 AM    Triglyceride 243 (H) 10/05/2020 10:35 AM     Lab Results   Component Value Date/Time    Microalb/Creat ratio (ug/mg creat.) 12 11/24/2020 08:59 AM     Currently taking:   Key Antihyperglycemic Medications             metFORMIN (GLUCOPHAGE) 500 mg tablet (Taking) Take 1 Tab by mouth two (2) times daily (with meals). Wt Readings from Last 3 Encounters:   10/14/20 205 lb 12.8 oz (93.4 kg)   05/06/19 222 lb (100.7 kg)   04/01/19 222 lb 12.8 oz (101.1 kg)     Improvement in vision  Weight gain noted - now: 213 lbs. Eating carbs & sweets regularly.  Binging with sweets cravings. Interested in seeing nutritionist.     BP Readings from Last 3 Encounters:   10/14/20 132/80   05/06/19 116/80   04/01/19 164/88     Health Maintenance Due   Topic Date Due    Foot Exam Q1  03/31/1985    Eye Exam Retinal or Dilated  03/31/1985    PAP AKA CERVICAL CYTOLOGY  06/15/2018       Review of Systems   Constitutional: Negative for fever. Respiratory: Negative for shortness of breath. Cardiovascular: Negative for chest pain and palpitations. Neurological: Negative for dizziness, loss of consciousness and weakness. Objective:     General: alert, cooperative, no distress   Mental  status: normal mood, behavior, speech, dress, motor activity, and thought processes, able to follow commands   HENT: NCAT   Neck: no visualized mass   Resp: no respiratory distress   Neuro: no gross deficits   Skin: no discoloration or lesions of concern on visible areas   Psychiatric: normal affect, consistent with stated mood, no evidence of hallucinations       We discussed the expected course, resolution and complications of the diagnosis(es) in detail. Medication risks, benefits, costs, interactions, and alternatives were discussed as indicated. I advised her to contact the office if her condition worsens, changes or fails to improve as anticipated. She expressed understanding with the diagnosis(es) and plan. Gómez Banda is a 39 y.o. female who was evaluated by a video visit encounter for concerns as above. Patient identification was verified prior to start of the visit. A caregiver was present when appropriate. Due to this being a TeleHealth encounter (During OUEZM-24 public health emergency), evaluation of the following organ systems was limited: Vitals/Constitutional/EENT/Resp/CV/GI//MS/Neuro/Skin/Heme-Lymph-Imm.   Pursuant to the emergency declaration under the [facility] waiver authority and the [place] Resources and Response Supplemental Appropriations Act, this Virtual  Visit was conducted, with patient's (and/or legal guardian's) consent, to reduce the patient's risk of exposure to COVID-19 and provide necessary medical care. Services were provided through a video synchronous discussion virtually to substitute for in-person clinic visit. Patient and provider were located at their individual homes.       Victor Manuel Ibarra PA-C

## 2020-12-15 NOTE — PROGRESS NOTES
1. Have you been to the ER, urgent care clinic since your last visit? Hospitalized since your last visit? No    2. Have you seen or consulted any other health care providers outside of the [address] [place] [address] since your last visit? Include any pap smears or colon screening.  Yes  Chief Complaint   Patient presents with    Diabetes     2 months follow up         Please send link to [email or web address]

## 2021-01-04 ENCOUNTER — TRANSCRIBE ORDER (OUTPATIENT)
Dept: SCHEDULING | Age: 46
End: 2021-01-04

## 2021-01-04 DIAGNOSIS — Z12.31 VISIT FOR SCREENING MAMMOGRAM: Primary | ICD-10-CM

## 2021-01-05 DIAGNOSIS — E11.9 TYPE 2 DIABETES MELLITUS WITHOUT COMPLICATION, WITHOUT LONG-TERM CURRENT USE OF INSULIN (HCC): ICD-10-CM

## 2021-01-05 NOTE — LETTER
1/18/2021 9:04 AM 
 
Ms. Coker [address] [facility] [id number] Liver enzymes and sugar levels are MUCH improved!!! You are doing fantastic work! Keep it up Sincerely, Aracelis Guerra PA-C

## 2021-01-13 LAB
ALBUMIN SERPL-MCNC: 4.5 G/DL (ref 3.8–4.8)
ALBUMIN/GLOB SERPL: 2.4 {RATIO} (ref 1.2–2.2)
ALP SERPL-CCNC: 97 IU/L (ref 39–117)
ALT SERPL-CCNC: 51 IU/L (ref 0–32)
AST SERPL-CCNC: 31 IU/L (ref 0–40)
BILIRUB SERPL-MCNC: 0.2 MG/DL (ref 0–1.2)
BUN SERPL-MCNC: 13 MG/DL (ref 6–24)
BUN/CREAT SERPL: 18 (ref 9–23)
CALCIUM SERPL-MCNC: 9.8 MG/DL (ref 8.7–10.2)
CHLORIDE SERPL-SCNC: 103 MMOL/L (ref 96–106)
CO2 SERPL-SCNC: 23 MMOL/L (ref 20–29)
CREAT SERPL-MCNC: 0.74 MG/DL (ref 0.57–1)
EST. AVERAGE GLUCOSE BLD GHB EST-MCNC: 151 MG/DL
GLOBULIN SER CALC-MCNC: 1.9 G/DL (ref 1.5–4.5)
GLUCOSE SERPL-MCNC: 157 MG/DL (ref 65–99)
HBA1C MFR BLD: 6.9 % (ref 4.8–5.6)
POTASSIUM SERPL-SCNC: 4.4 MMOL/L (ref 3.5–5.2)
PROT SERPL-MCNC: 6.4 G/DL (ref 6–8.5)
SODIUM SERPL-SCNC: 138 MMOL/L (ref 134–144)

## 2021-03-01 ENCOUNTER — HOSPITAL ENCOUNTER (OUTPATIENT)
Dept: MAMMOGRAPHY | Age: 46
Discharge: HOME | End: 2021-03-01
Attending: PHYSICIAN ASSISTANT
Payer: COMMERCIAL

## 2021-03-01 DIAGNOSIS — Z12.31 VISIT FOR SCREENING MAMMOGRAM: ICD-10-CM

## 2021-03-01 PROCEDURE — 77067 SCR MAMMO BI INCL CAD: CPT

## 2021-04-01 DIAGNOSIS — E11.9 TYPE 2 DIABETES MELLITUS WITHOUT COMPLICATION, WITHOUT LONG-TERM CURRENT USE OF INSULIN (HCC): ICD-10-CM

## 2021-04-02 RX ORDER — METFORMIN HYDROCHLORIDE 500 MG/1
TABLET ORAL
Qty: 60 TAB | Refills: 2 | Status: SHIPPED | OUTPATIENT
Start: 2021-04-02 | End: 2022-09-08

## 2021-05-10 ENCOUNTER — HOSPITAL ENCOUNTER (OUTPATIENT)
Dept: CARDIAC REHAB | Age: 46
Discharge: HOME | End: 2021-05-10
Attending: PHYSICIAN ASSISTANT
Payer: COMMERCIAL

## 2021-05-10 PROCEDURE — 97802 MEDICAL NUTRITION INDIV IN: CPT | Performed by: DIETITIAN, REGISTERED

## 2021-05-10 NOTE — TELEMEDICINE
Octavia Farr was informed of the inherent limitations of a virtual visit,  and has consented to a virtual therapy visit on 5/10/2021. Information regarding emergency contact information for this patient during this visit is to contact: Ema Ambriz  at [phone] in addition to calling 911. The patient was informed that at any time during the virtual visit, they can decide to stop the virtual visit. The patient verified that they are physically located in the [facility] for this virtual visit. [address] [facility] NUTRITION CLINIC NOTE  DATE: 5/10/2021      REFERRING PHYSICIAN: Veronica Rich    NAME: Octavia Farr : 1975 AGE: 55 y.o. 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 9) E66.9    Recurrent depression ([address] F33.9    Severe obesity ([address] E66.01    Essential hypertension I10    Type 2 diabetes mellitus without complication, without long-term current use of insulin ([address] E11.9       LABS:   Lab Results   Component Value Date/Time    Cholesterol, total 175 10/05/2020 10:35 AM    HDL Cholesterol 47 10/05/2020 10:35 AM    LDL, calculated 88 10/05/2020 10:35 AM    LDL, calculated 65 10/23/2018 09:27 AM    VLDL, calculated 40 10/05/2020 10:35 AM    VLDL, calculated 44 (H) 10/23/2018 09:27 AM    Triglyceride 243 (H) 10/05/2020 10:35 AM     Lab Results   Component Value Date/Time    Hemoglobin A1c 6.9 (H) 2021 08:51 AM     Per patient A1c was 7.6 when checked by OB/GYN on 5/3/21    Patient does not SMBG    MEDICATIONS/SUPPLEMENTS:   [unfilled]  Prior to Admission medications Medication Sig Start Date End Date Taking? Authorizing Provider   metFORMIN (GLUCOPHAGE) 500 mg tablet TAKE 1 TABLET BY MOUTH TWICE DAILY WITH MEALS 4/2/21   Jose Antonio Grace PA-C   naproxen (NAPROSYN) 500 mg tablet TAKE 1 TABLET BY MOUTH TWICE DAILY WITH MEALS 11/29/20   Provider, Historical   esomeprazole (NexIUM) 20 mg capsule Take 20 mg by mouth daily. Provider, Historical   aspirin delayed-release 81 mg tablet Take 81 mg by mouth daily. Provider, Historical   Trintellix 10 mg tablet  10/13/20   Provider, Historical   albuterol (PROVENTIL HFA, VENTOLIN HFA, PROAIR HFA) 90 mcg/actuation inhaler  4/12/19   Provider, Historical   fexofenadine-pseudoephedrine (ALLEGRA-D 24 HOUR) 180-240 mg per tablet Take 1 Tab by mouth daily. Provider, Historical   REXULTI 1 mg tab tablet  3/14/19   Provider, Historical   QNASL 80 mcg/actuation HFAA  8/10/16   Provider, Historical   multivitamin (ONE A DAY) tablet Take 1 Tab by mouth daily.     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 usually day trips but now mostly virtual; she lives alone Monday thru Friday then stays with her boyfriend through the weekend    REPORTED WEIGHT HISTORY:  Struggled with weight even as a school aged child; around age 36 decided to make some changes and lost down to size 10/12 now back up to size 18/20        ANTHROPOMETRICS:    Ht Readings from Last 1 Encounters:   10/14/20 5' 4\" (1.626 m)      Wt Readings from Last 10 Encounters:   10/14/20 93.4 kg (205 lb 12.8 oz)   05/06/19 100.7 kg (222 lb)   04/01/19 101.1 kg (222 lb 12.8 oz)   10/23/18 94.3 kg (207 lb 12.8 oz)   01/30/18 92 kg (202 lb 14.4 oz)   12/13/17 92.9 kg (204 lb 11.2 oz)   12/12/17 92.5 kg (204 lb)   10/20/17 91.2 kg (201 lb 1.6 oz)   10/02/17 91.8 kg (202 lb 6.4 oz)   08/17/17 89.1 kg (196 lb 6.4 oz)     Per patient on home scale:  5/10/21 220.5 lbs     IBW:120 # +/- 10%  %IBW: 184% +/- 10%    BMI: 37.8 kg/M2 Category: obese          NUTRITION ASSESSMENT:    FOOD ALLERGIES/INTOLERANCES: no known food allergies    24 HOUR DIET RECALL  Breakfast  Scrambled eggs w/ peppers & onions & cheese, perez, iced coffee (2 bottles; unsweeted, added Stevia and hazelnut creamer)   Snack     Lunch  Not hungry for lunch so ate chips & salsa   Snack  Cheese & crackers   Dinner  At [employer]: pork tenderloin, mashed sweet potatoes, brussels sprouts, wine (2 or 3)   Snack  water     Octavia Farr states she is better about cooking when she is with her boyfriend, more likely to go out or fix something in the microwave when by herself. Yesterday atypical.  Usually has a breakfast sandwich [employer] (english muffin, turkey sausage, cheese & egg) with coffee (Stevia & hazelnut creamer). Lunch is either sandwich (turkey/ham w/ cheese & mendez) on [employer] killer bread and chips. Snacks in the afternoon - trying to stick with [place] yogurt or may have chips or cheese & crackers. Reuben Blend is sushi once a week, at [employer] once a week, restaurant once a week, microwave meal otherwise or [employer] roast beef & curly fries. Typically drink water with meals. Reuben Blend is typically anywhere from 5:30 to 7:30 pm. \"downfall\" is eating [employer] frozen bars at night (has 2). Admits she has done very little to change her diet thus far but states she is ready to make changes and working with a counselor to help with her motivation level. Has a history of emotional binge eating.       NUTRITION DIAGNOSIS:  Food and nutrition related knowledge deficit related to new diagnosis of diabetes as evidenced by pt request for RD education and counseling. ESTIMATED ENERGY NEEDS:  2115  (using [employer] Pump with AF 1.3) for weight maintenance; - 500 for weight loss at rate of 1 lb per week    NUTRITION INTERVENTION:  Nutrition 60 minute one-on-one education & goal setting with [address] with Octavia Farr relevant labs compared to ideals.     Reviewed weight history and patient's verbalized weight goal as well as any real or perceived barriers to obtaining the goal. Collaborated with patient to set a specific short and long term weight goal.     Conducted a verbal diet recall and assessed for environmental, financial, psychosocial, physical and comorbidities that may impact the food and eating patterns / behaviors of [address] [place] [address] with patient to set specific nutrient goals as well as specific food / behavior changes that will help patient meet the overall goal of: lower A1c    NUTRITION EDUCATION / HANDOUTS PROVIDED:  - Diabetes Plate  - Healthy Meal Builder Mix and Match Guide  - American Diabetes Association \"Taking Care of Type 2 Diabetes\"  - Snack Attack healthy snack ideas      PATIENT GOALS:    Weight Goals:    Short Term Weight Goal: </= 220.5 lbs  Long Term Weight Goal: <200 lbs  (per patient dream weight is 150 lbs)    Nutrition Goals:    - use the plate method: fill the plate first half full with non-starchy vegetables, 3 oz lean meat then small portion (no more than 1 cup) of starchy vegetable / grain (discussed modified versions for mixed dishes such as sandwiches)  - if using frozen meals for dinner choose those with no more than 45 g carbohydrate per entree; supplement with additional non-starchy vegetables if desired / still hungry  - keep night snack to 1 serving; eat it slowly and mindfully (no distractions)  - try brushing teeth after dinner and / or evening snack to signal the end of the eating day    Activity Goals:  - 30 minutes of moderate activity at least 3 days per week, no more than 2 days off in a row  (afternoons / evenings)  - Every 30 minutes sitting, get up and move for 30 seconds (3 minutes max)  - Note exercise on daily calendar with a check neto or similar    Other Goals:  - continue behavioral counseling             Specific tips and techniques to facilitate compliance with above recommendations were provided and discussed. Octavia Farr verbalized understanding. The patient was encouraged to contact me as needed. Follow-up: scheduled 4/26 at [address] [facility], RD, Rafi Cutler is a 55 y.o. female being evaluated by a Virtual Visit (video visit) encounter to address concerns as mentioned above. A caregiver was present when appropriate. Due to this being a TeleHealth encounter (During [facility] public health emergency), evaluation of the following areas was limited: Nutrition Focused Physical Exam. Pursuant to the emergency declaration under the [facility], [address] [place] [address] authority and the [employer] and [employer] Act, this Virtual Visit was conducted with patient's (and/or legal guardian's) consent, to reduce the risk of exposure to COVID-19 and provide necessary medical care. Services were provided through a video synchronous discussion virtually to substitute for in-person encounter. --Deanna Garrido RD on 5/10/2021 at 1:32 PM    An electronic signature was used to authenticate this note.

## 2021-05-18 ENCOUNTER — VIRTUAL VISIT (OUTPATIENT)
Dept: INTERNAL MEDICINE CLINIC | Age: 46
End: 2021-05-18
Payer: COMMERCIAL

## 2021-05-18 DIAGNOSIS — E11.9 TYPE 2 DIABETES MELLITUS WITHOUT COMPLICATION, WITHOUT LONG-TERM CURRENT USE OF INSULIN (HCC): Primary | ICD-10-CM

## 2021-05-18 DIAGNOSIS — F43.21 GRIEF: ICD-10-CM

## 2021-05-18 DIAGNOSIS — E66.01 SEVERE OBESITY (HCC): ICD-10-CM

## 2021-05-18 DIAGNOSIS — F33.9 RECURRENT DEPRESSION (HCC): ICD-10-CM

## 2021-05-18 PROCEDURE — 99213 OFFICE O/P EST LOW 20 MIN: CPT | Performed by: PHYSICIAN ASSISTANT

## 2021-05-18 RX ORDER — GLUCOSAMINE SULFATE 1500 MG
POWDER IN PACKET (EA) ORAL DAILY
COMMUNITY
End: 2022-09-08

## 2021-05-18 NOTE — PROGRESS NOTES
1. Have you been to the ER, urgent care clinic since your last visit? Hospitalized since your last visit? No    2. Have you seen or consulted any other health care providers outside of the [address] [place] [address] since your last visit? Include any pap smears or colon screening. No   Chief Complaint   Patient presents with    Diabetes     follow up     Please send link to [email or web address]. edu

## 2021-05-18 NOTE — PROGRESS NOTES
Nick Solitario is a 55 y.o. female who was seen by synchronous (real-time) audio-video technology on 5/18/2021    Consent: Nick Solitario, who was seen by synchronous (real-time) audio-video technology, and/or her healthcare decision maker, is aware that this patient-initiated, Telehealth encounter on 5/18/2021 is a billable service, with coverage as determined by her insurance carrier. She is aware that she may receive a bill and has provided verbal consent to proceed: YES  Subjective: Nick Solitario is a 55 y.o. female who was seen for Diabetes (follow up)    DM II - controlled at last check in Jan, but A1c 7.6 at GYN recently - seeing endocrine & GI (elevated liver enzymes) - Endocrine now has pt on metforminn XL 2000 mg/day, and pt is picking up a glucometer & doing education this week. Had first appointment with dietician last Monday. Will do follow up soon. GI appointment 6/1/21. Now journaling food and going for walk/biking 1x/week[place]      Lab Results   Component Value Date/Time    Hemoglobin A1c 6.9 (H) 01/12/2021 08:51 AM     Lab Results   Component Value Date/Time    Cholesterol, total 175 10/05/2020 10:35 AM    HDL Cholesterol 47 10/05/2020 10:35 AM    LDL, calculated 88 10/05/2020 10:35 AM    LDL, calculated 65 10/23/2018 09:27 AM    VLDL, calculated 40 10/05/2020 10:35 AM    VLDL, calculated 44 (H) 10/23/2018 09:27 AM    Triglyceride 243 (H) 10/05/2020 10:35 AM     Lab Results   Component Value Date/Time    Microalb/Creat ratio (ug/mg creat.) 12 11/24/2020 08:59 AM     Currently taking:   Key Antihyperglycemic Medications             metFORMIN (GLUCOPHAGE) 500 mg tablet (Taking) TAKE 1 TABLET BY MOUTH TWICE DAILY WITH MEALS        Wt Readings from Last 3 Encounters:   10/14/20 205 lb 12.8 oz (93.4 kg)   05/06/19 222 lb (100.7 kg)   04/01/19 222 lb 12.8 oz (101.1 kg)     Grief - Father passed away in Feb. Has good support system - planning [facility] service in June. Now back on [employer] per Psych and doing counseling. Health Maintenance Due   Topic Date Due    Pneumococcal 0-64 years (1 of 1 - PPSV23) Never done    Foot Exam Q1  Never done    Eye Exam Retinal or Dilated  Never done    PAP AKA CERVICAL CYTOLOGY  06/15/2018   seeing gyn & eye doctor regularly. Review of Systems   Constitutional: Negative for fever and malaise/fatigue. Respiratory: Negative for shortness of breath. Cardiovascular: Negative for chest pain and palpitations. Neurological: Negative for dizziness, loss of consciousness and weakness. Psychiatric/Behavioral: Negative for depression. Objective:     General: alert, cooperative, no distress   Mental  status: normal mood, behavior, speech, dress, motor activity, and thought processes, able to follow commands   HENT: NCAT   Neck: no visualized mass   Resp: no respiratory distress   Neuro: no gross deficits   Skin: no discoloration or lesions of concern on visible areas   Psychiatric: normal affect, consistent with stated mood, no evidence of hallucinations     Assessment & Plan:     Encounter Diagnoses     ICD-10-CM ICD-9-CM   1. Type 2 diabetes mellitus without complication, without long-term current use of insulin ([facility])  E11.9 250.00   2. Recurrent depression ([facility])  F33.9 296.30   3. Severe obesity ([facility]  E66.01 278.01   4. Grief  F43.21 309.0     Encouraged pt's excellent organization/compliance/motivation. Continue diet/exercise changes, and follow ups with Endocrine, Dietician, Therapist, Psychiatry. We discussed the expected course, resolution and complications of the diagnosis(es) in detail. Medication risks, benefits, costs, interactions, and alternatives were discussed as indicated. I advised her to contact the office if her condition worsens, changes or fails to improve as anticipated. She expressed understanding with the diagnosis(es) and plan. Jeffery Riley is a 55 y.o. female who was evaluated by a video visit encounter for concerns as above. Patient identification was verified prior to start of the visit. A caregiver was present when appropriate. Due to this being a TeleHealth encounter (During [facility]-72 public health emergency), evaluation of the following organ systems was limited: Vitals/Constitutional/EENT/Resp/CV/GI//MS/Neuro/Skin/Heme-Lymph-Imm. Pursuant to the emergency declaration under the [address] [place] [address], [facility] waiver authority and the [employer] Act, this Virtual  Visit was conducted, with patient's (and/or legal guardian's) consent, to reduce the patient's risk of exposure to COVID-19 and provide necessary medical care. Services were provided through a video synchronous discussion virtually to substitute for in-person clinic visit. Patient and provider were located at their individual homes.       Linda Kurtz PA-C

## 2021-05-26 ENCOUNTER — HOSPITAL ENCOUNTER (OUTPATIENT)
Dept: CARDIAC REHAB | Age: 46
Discharge: HOME | End: 2021-05-26
Attending: PHYSICIAN ASSISTANT
Payer: COMMERCIAL

## 2021-05-26 PROCEDURE — 97803 MED NUTRITION INDIV SUBSEQ: CPT | Performed by: DIETITIAN, REGISTERED

## 2021-05-26 NOTE — TELEMEDICINE
Octavia Farr was informed of the inherent limitations of a virtual visit,  and has consented to a virtual therapy visit on 2021. Information regarding emergency contact information for this patient during this visit is to contact: Pavel Garcia  at [phone] in addition to calling 911. The patient was informed that at any time during the virtual visit, they can decide to stop the virtual visit. The patient verified that they are physically located in the [facility] for this virtual visit. [address] [facility] NUTRITION CLINIC NOTE  DATE: 2021      REFERRING PHYSICIAN: Allegra Rodriguez    NAME: Octavia Farr : 1975 AGE: 55 y.o.   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    Chronic UTI N39.0    Insomnia G47.00    Recurrent depression (HCC) F33.9    Severe obesity (HCC) E66.01    Essential hypertension I10    Type 2 diabetes mellitus without complication, without long-term current use of insulin (HCC) E11.9    Grief F43.21       LABS:   Lab Results   Component Value Date/Time    Cholesterol, total 175 10/05/2020 10:35 AM    HDL Cholesterol 47 10/05/2020 10:35 AM    LDL, calculated 88 10/05/2020 10:35 AM    LDL, calculated 65 10/23/2018 09:27 AM    VLDL, calculated 40 10/05/2020 10:35 AM    VLDL, calculated 44 (H) 10/23/2018 09:27 AM    Triglyceride 243 (H) 10/05/2020 10:35 AM     Lab Results   Component Value Date/Time    Hemoglobin A1c 6.9 (H) 2021 08:51 AM     Per patient A1c was 7.6 when checked by OB/GYN on 5/3/21    Patient does not SMBG but just got glucometer on 21 to start checking FBG and 1 hour after meals per endocrinologist    MEDICATIONS/SUPPLEMENTS:   [unfilled]  Prior to Admission medications    Medication Sig Start Date End Date Taking? Authorizing Provider   cholecalciferol (Vitamin D3) 25 mcg (1,000 unit) cap Take  by mouth daily. Provider, Historical   metFORMIN (GLUCOPHAGE) 500 mg tablet TAKE 1 TABLET BY MOUTH TWICE DAILY WITH MEALS 4/2/21   Rebekah Lowe PA-C   esomeprazole (NexIUM) 20 mg capsule Take 20 mg by mouth daily. Provider, Historical   aspirin delayed-release 81 mg tablet Take 81 mg by mouth daily. Provider, Historical   Trintellix 10 mg tablet  10/13/20   Provider, Historical   albuterol (PROVENTIL HFA, VENTOLIN HFA, PROAIR HFA) 90 mcg/actuation inhaler  4/12/19   Provider, Historical   fexofenadine-pseudoephedrine (ALLEGRA-D 24 HOUR) 180-240 mg per tablet Take 1 Tab by mouth daily. Provider, Historical   REXULTI 1 mg tab tablet  3/14/19   Provider, Historical   multivitamin (ONE A DAY) tablet Take 1 Tab by mouth daily.     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 usually day trips but now mostly virtual; she lives alone Monday thru Friday then stays with her boyfriend through the weekend    REPORTED WEIGHT HISTORY:  Struggled with weight even as a school aged child; around age 36 decided to make some changes and lost down to size 10/12 now back up to size 18/20        ANTHROPOMETRICS: Ht Readings from Last 1 Encounters:   10/14/20 5' 4\" (1.626 m)      Wt Readings from Last 10 Encounters:   10/14/20 93.4 kg (205 lb 12.8 oz)   05/06/19 100.7 kg (222 lb)   04/01/19 101.1 kg (222 lb 12.8 oz)   10/23/18 94.3 kg (207 lb 12.8 oz)   01/30/18 92 kg (202 lb 14.4 oz)   12/13/17 92.9 kg (204 lb 11.2 oz)   12/12/17 92.5 kg (204 lb)   10/20/17 91.2 kg (201 lb 1.6 oz)   10/02/17 91.8 kg (202 lb 6.4 oz)   08/17/17 89.1 kg (196 lb 6.4 oz)     Per patient on home scale:  5/10/21 220.5 lbs  5/26/21 217.5 lbs     IBW:120 # +/- 10%  %IBW: 184% +/- 10%    BMI: 37.8 kg/M2 Category: obese          NUTRITION ASSESSMENT:    FOOD ALLERGIES/INTOLERANCES: no known food allergies        Octavia Farr started tracking on [employer] 2 days ago. Carbohydrates at breakfast were within 45g but exceeded 60g at both lunch and dinner both days. She states over the weekend she did discuss with her boyfriend the need to have green veggies with her meals and both of the past 2 days she did have 1/2 salad with 1/2 sandwich for lunch. Although no night snacks were consumed the past 2 days she states it was because she was out late, otherwise she has still been snacking. Overall, she has not made significant changes to her diet yet but did start the food diary and anticipates the data it provides will help motivate change. INITIAL NUTRITION DIAGNOSIS:  Food and nutrition related knowledge deficit related to new diagnosis of diabetes as evidenced by pt request for RD education and counseling. ESTIMATED ENERGY NEEDS:  2115  (using [address] with AF 1.3) for weight maintenance; - 500 for weight loss at rate of 1 lb per week    NUTRITION INTERVENTION:  Nutrition 60 minute follow-up one-on-one education & goal setting with [unfilled]    Reviewed with Octavia previous goals and plans established at last visit, any real or perceived barriers to obtaining the goal / adhering to plan, and progress towards goal made. Collaborated with patient to establish or revise specific nutrient goals as well as specific food / behavior changes that will help patient meet the overall goal of: glycemic control and weight loss    NUTRITION EDUCATION / HANDOUTS PROVIDED:  - AND Nutrition Care Manual Patient Education \"Carbohydrate Counting for People With Diabetes\"  - AND Nutrition Care Manual Patient Education \"Label Reading Tips\" for people with diabetes       PATIENT GOALS:    Weight Goals:    Short Term Weight Goal: </= 220.5 lbs  Long Term Weight Goal: <200 lbs  (per patient dream weight is 150 lbs)    Nutrition Goals:    1600 calories / day  45 grams of carbohydrate / meal    - use the plate method: fill the plate first half full with non-starchy vegetables, 3 oz lean meat then small portion (no more than 1 cup) of starchy vegetable / grain (discussed modified versions for mixed dishes such as sandwiches)  - if using frozen meals for dinner choose those with no more than 45 g carbohydrate per entree; supplement with additional non-starchy vegetables if desired / still hungry  - keep night snack to 1 serving; eat it slowly and mindfully (no distractions)  - try brushing teeth after dinner and / or evening snack to signal the end of the eating day    Activity Goals:  - 30 minutes of moderate activity at least 3 days per week, no more than 2 days off in a row  (afternoons / evenings)  - Every 30 minutes sitting, get up and move for 30 seconds (3 minutes max)  - Note exercise on daily calendar with a check neto or similar    Other Goals:  - check blood sugar as directed by your doctor; compare readings to your food diary and exercise level and look for patterns (use notes section in myfitnesspal)      Specific tips and techniques to facilitate compliance with above recommendations were provided and discussed. Octavia Farr verbalized understanding. The patient was encouraged to contact me as needed.       Follow-up scheduled Wednesday, June 16 at 4:00 pm              Noa Wu RD, HILDA Garcia is a 55 y.o. female being evaluated by a Virtual Visit (video visit) encounter to address concerns as mentioned above. A caregiver was present when appropriate. Due to this being a TeleHealth encounter ([facility]), evaluation of the following areas was limited: Nutrition Focused Physical Exam. Pursuant to the emergency declaration under the [address] [place] [address], [address] [place] [address] authority and the [employer] Act, this Virtual Visit was conducted with patient's (and/or legal guardian's) consent, to reduce the risk of exposure to COVID-19 and provide necessary medical care. Services were provided through a video synchronous discussion virtually to substitute for in-person encounter. --Noa uW RD on 5/26/2021 at 1:32 PM    An electronic signature was used to authenticate this note.

## 2021-06-16 ENCOUNTER — HOSPITAL ENCOUNTER (OUTPATIENT)
Dept: CARDIAC REHAB | Age: 46
Discharge: HOME | End: 2021-06-16
Attending: PHYSICIAN ASSISTANT
Payer: COMMERCIAL

## 2021-06-16 PROCEDURE — 97803 MED NUTRITION INDIV SUBSEQ: CPT | Performed by: DIETITIAN, REGISTERED

## 2021-06-16 NOTE — TELEMEDICINE
Octavia Farr was informed of the inherent limitations of a virtual visit,  and has consented to a virtual therapy visit on 2021. Information regarding emergency contact information for this patient during this visit is to contact: Sena Huerta  at [phone] in addition to calling 911. The patient was informed that at any time during the virtual visit, they can decide to stop the virtual visit. The patient verified that they are physically located in the [facility] for this virtual visit. [address] [facility] NUTRITION CLINIC NOTE  DATE: 2021      REFERRING PHYSICIAN: Uche Chapman    NAME: Octavia Farr : 1975 AGE: 55 y.o. 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    Chronic UTI N39.0    Insomnia G47.00    Recurrent depression (HCC) F33.9    Severe obesity (HCC) E66.01    Essential hypertension I10    Type 2 diabetes mellitus without complication, without long-term current use of insulin (HCC) E11.9    Grief F43.21       LABS:   Lab Results   Component Value Date/Time    Cholesterol, total 175 10/05/2020 10:35 AM    HDL Cholesterol 47 10/05/2020 10:35 AM    LDL, calculated 88 10/05/2020 10:35 AM    LDL, calculated 65 10/23/2018 09:27 AM    VLDL, calculated 40 10/05/2020 10:35 AM    VLDL, calculated 44 (H) 10/23/2018 09:27 AM    Triglyceride 243 (H) 10/05/2020 10:35 AM     Lab Results   Component Value Date/Time    Hemoglobin A1c 6.9 (H) 2021 08:51 AM     Per patient A1c was 7.6 when checked by OB/GYN on 5/3/21    SMBG fasting and 1 hour after meals per endocrinologist and states BG consistently >200.   Had BG <140 for the first time, which was today 1 hour post lunch. FBG today 284    MEDICATIONS/SUPPLEMENTS:   [unfilled]  Prior to Admission medications    Medication Sig Start Date End Date Taking? Authorizing Provider   cholecalciferol (Vitamin D3) 25 mcg (1,000 unit) cap Take  by mouth daily. Provider, Historical   metFORMIN (GLUCOPHAGE) 500 mg tablet TAKE 1 TABLET BY MOUTH TWICE DAILY WITH MEALS 4/2/21   Surjit Peraza PA-C   esomeprazole (NexIUM) 20 mg capsule Take 20 mg by mouth daily. Provider, Historical   aspirin delayed-release 81 mg tablet Take 81 mg by mouth daily. Provider, Historical   Trintellix 10 mg tablet  10/13/20   Provider, Historical   albuterol (PROVENTIL HFA, VENTOLIN HFA, PROAIR HFA) 90 mcg/actuation inhaler  4/12/19   Provider, Historical   fexofenadine-pseudoephedrine (ALLEGRA-D 24 HOUR) 180-240 mg per tablet Take 1 Tab by mouth daily. Provider, Historical   REXULTI 1 mg tab tablet  3/14/19   Provider, Historical   multivitamin (ONE A DAY) tablet Take 1 Tab by mouth daily. Provider, Historical     Taking Vitamin D also; metformin was increased to 1000 mg twice a day XR version as of 5/27/21.     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 usually day trips but now mostly virtual; she lives alone Monday thru Friday then stays with her boyfriend through the weekend    REPORTED WEIGHT HISTORY:  Struggled with weight even as a school aged child; around age 36 decided to make some changes and lost down to size 10/12 now back up to size 18/20        ANTHROPOMETRICS:    Ht Readings from Last 1 Encounters:   10/14/20 5' 4\" (1.626 m)      Wt Readings from Last 10 Encounters:   10/14/20 93.4 kg (205 lb 12.8 oz)   05/06/19 100.7 kg (222 lb)   04/01/19 101.1 kg (222 lb 12.8 oz)   10/23/18 94.3 kg (207 lb 12.8 oz)   01/30/18 92 kg (202 lb 14.4 oz)   12/13/17 92.9 kg (204 lb 11.2 oz)   12/12/17 92.5 kg (204 lb)   10/20/17 91.2 kg (201 lb 1.6 oz)   10/02/17 91.8 kg (202 lb 6.4 oz)   08/17/17 89.1 kg (196 lb 6.4 oz)     Per patient on home scale:  5/10/21 220.5 lbs  5/26/21 217.5 lbs  6/16/21 215.5 lbs     IBW:120 # +/- 10%  %IBW: 180% +/- 10%    BMI: 37.0  kg/M2 Category: obese          NUTRITION ASSESSMENT:    FOOD ALLERGIES/INTOLERANCES: no known food allergies        Octavia Farr started tracking on [employer] on 5/24/21 through 5/30/21, after which point tracking become sporadic and then stopped. States got off track over a weekend, felt overwhelmed trying to log homemade foods, and then never got back to it. She did find it helpful. She did observe how many carbohydrates were in the sparkling mana wine she was drinking so she looked up lower carbohydrate ligia and switched to pinot noir now. States lately her week day breakfast, lunch & snack have been consistent; but dinners vary eating out with friends more. States the past few outings only ate half her plate vs all of it like she used to do. Dinners and night snacks t have continued to contribute significantly to carbohydrates and calories; Octavia states the past 2 nights she was playing with her new puppy and this helped her not snack at night. She also reports observing that it helps her when she eats a salad before dinner.      Breakfast: breakfast sandwich (Alex Morin)  Lunch: ham & cheese wrap on \"carb balance\" whole wheat tortilla; handful of nuts  Snack: yogurt with granola      INITIAL NUTRITION DIAGNOSIS:  Food and nutrition related knowledge deficit related to new diagnosis of diabetes as evidenced by pt request for RD education and counseling. NUTRITION DIAGNOSIS 2:  Limited adherence to nutrition recommendations related to lack of self efficacy for making change and stage of change in contemplation / preparation as evidenced by food choices and eating pattern not aligned with recommended nutrition prescription. for dinners and snacks and has not started exercise        ESTIMATED ENERGY NEEDS:  2115  (using [address] with AF 1.3) for weight maintenance; - 500 for weight loss at rate of 1 lb per week    NUTRITION INTERVENTION:  Nutrition 30 minute follow-up one-on-one education & goal setting with Octavia. Reviewed with Tanner Arias previous goals and plans established at last visit, any real or perceived barriers to obtaining the goal / adhering to plan, and progress towards goal made.       Collaborated with patient to establish or revise specific nutrient goals as well as specific food / behavior changes that will help patient meet the overall goal of: glycemic control and weight loss    NUTRITION EDUCATION / HANDOUTS PROVIDED:        PATIENT GOALS:    Weight Goals:    Short Term Weight Goal: </= 215.5 lbs  Long Term Weight Goal: <200 lbs  (per patient dream weight is 150 lbs)    Nutrition Goals:    1600 calories / day  45 grams of carbohydrate / meal    - use the plate method: fill the plate first half full with non-starchy vegetables, 3 oz lean meat then small portion (no more than 1 cup) of starchy vegetable / grain (discussed modified versions for mixed dishes such as sandwiches)  - if using frozen meals for dinner choose those with no more than 45 g carbohydrate per entree; supplement with additional non-starchy vegetables if desired / still hungry  - avoid night snacking or only have a low or no carb snack  - eat salad before dinner and / or eat half portion at restaurant meal  - resume tracking week days on [employer]    Activity Goals:  - 30 minutes of moderate activity at least 3 days per week, no more than 2 days off in a row  (afternoons / evenings)  - Every 30 minutes sitting, get up and move for 30 seconds (3 minutes max)  - Note exercise on daily calendar with a check neto or similar    Other Goals:  - check blood sugar as directed by your doctor; compare readings to your food diary and exercise level and look for patterns (use notes section in myfitnesspal)      Specific tips and techniques to facilitate compliance with above recommendations were provided and discussed. Octavia Farr verbalized understanding. The patient was encouraged to contact me as needed. Follow-up scheduled Tuesday, July 6 at 4:00 pm              Jean Calixto RD, [facility] is a 55 y.o. female being evaluated by a Virtual Visit (video visit) encounter to address concerns as mentioned above. A caregiver was present when appropriate. Due to this being a TeleHealth encounter (During [place] [address] public health emergency), evaluation of the following areas was limited: Nutrition Focused Physical Exam. Pursuant to the emergency declaration under the [facility], [facility] authority and the [employer] and [employer] Act, this Virtual Visit was conducted with patient's (and/or legal guardian's) consent, to reduce the risk of exposure to COVID-19 and provide necessary medical care. Services were provided through a video synchronous discussion virtually to substitute for in-person encounter. --Jean Calixto RD on 6/16/2021 at 1:32 PM    An electronic signature was used to authenticate this note.

## 2021-07-20 ENCOUNTER — HOSPITAL ENCOUNTER (OUTPATIENT)
Dept: CARDIAC REHAB | Age: 46
Discharge: HOME | End: 2021-07-20
Attending: PHYSICIAN ASSISTANT
Payer: COMMERCIAL

## 2021-07-20 PROCEDURE — 97803 MED NUTRITION INDIV SUBSEQ: CPT | Performed by: DIETITIAN, REGISTERED

## 2021-07-20 NOTE — TELEMEDICINE
Octavia Farr was informed of the inherent limitations of a virtual visit,  and has consented to a virtual therapy visit on 2021. Information regarding emergency contact information for this patient during this visit is to contact: María Amaya  at [phone] in addition to calling 911. The patient was informed that at any time during the virtual visit, they can decide to stop the virtual visit. The patient verified that they are physically located in the [facility] for this virtual visit. [address] [facility] NUTRITION CLINIC NOTE  DATE: 2021      REFERRING PHYSICIAN: Vitaly Malagon    NAME: Octavia Farr : 1975 AGE: 55 y.o.   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    Chronic UTI N39.0    Insomnia G47.00    Recurrent depression (HCC) F33.9    Severe obesity (HCC) E66.01    Essential hypertension I10    Type 2 diabetes mellitus without complication, without long-term current use of insulin (HCC) E11.9    Grief F43.21       LABS:   Lab Results   Component Value Date/Time    Cholesterol, total 175 10/05/2020 10:35 AM    HDL Cholesterol 47 10/05/2020 10:35 AM    LDL, calculated 88 10/05/2020 10:35 AM    LDL, calculated 65 10/23/2018 09:27 AM    VLDL, calculated 40 10/05/2020 10:35 AM    VLDL, calculated 44 (H) 10/23/2018 09:27 AM    Triglyceride 243 (H) 10/05/2020 10:35 AM     Lab Results   Component Value Date/Time    Hemoglobin A1c 6.9 (H) 2021 08:51 AM     Per patient A1c was 7.6 when checked by OB/GYN on 5/3/21    SMBG fasting and 1 hour after meals per endocrinologist, per patient has been inconsistent; states BG consistently >200. Patient attributes high BG due to side effect of Rexulti and as this is a long term medication plans to discuss with endocrinologist at August visit. ; 1 hour PPD breakfast was 297; 1 hour after lunch was 220  Saturday 7/10 1 hour after dinner was 218; 260 the following morning. MEDICATIONS/SUPPLEMENTS:   [unfilled]  Prior to Admission medications    Medication Sig Start Date End Date Taking? Authorizing Provider   cholecalciferol (Vitamin D3) 25 mcg (1,000 unit) cap Take  by mouth daily. Provider, Historical   metFORMIN (GLUCOPHAGE) 500 mg tablet TAKE 1 TABLET BY MOUTH TWICE DAILY WITH MEALS 4/2/21   Carlos Jaems PA-C   esomeprazole (NexIUM) 20 mg capsule Take 20 mg by mouth daily. Provider, Historical   aspirin delayed-release 81 mg tablet Take 81 mg by mouth daily. Provider, Historical   Trintellix 10 mg tablet  10/13/20   Provider, Historical   albuterol (PROVENTIL HFA, VENTOLIN HFA, PROAIR HFA) 90 mcg/actuation inhaler  4/12/19   Provider, Historical   fexofenadine-pseudoephedrine (ALLEGRA-D 24 HOUR) 180-240 mg per tablet Take 1 Tab by mouth daily. Provider, Historical   REXULTI 1 mg tab tablet  3/14/19   Provider, Historical   multivitamin (ONE A DAY) tablet Take 1 Tab by mouth daily. Provider, Historical     Taking Vitamin D also; metformin was increased to 1000 mg twice a day or 2000 mg once a day XR version as of 5/27/21.     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 usually day trips but now mostly virtual; she lives alone Monday thru Friday then stays with her boyfriend through the weekend    REPORTED WEIGHT HISTORY:  Struggled with weight even as a school aged child; around age 36 decided to make some changes and lost down to size 10/12 now back up to size 18/20        ANTHROPOMETRICS:    Ht Readings from Last 1 Encounters:   10/14/20 5' 4\" (1.626 m)      Wt Readings from Last 10 Encounters:   10/14/20 93.4 kg (205 lb 12.8 oz)   05/06/19 100.7 kg (222 lb)   04/01/19 101.1 kg (222 lb 12.8 oz)   10/23/18 94.3 kg (207 lb 12.8 oz)   01/30/18 92 kg (202 lb 14.4 oz)   12/13/17 92.9 kg (204 lb 11.2 oz)   12/12/17 92.5 kg (204 lb)   10/20/17 91.2 kg (201 lb 1.6 oz)   10/02/17 91.8 kg (202 lb 6.4 oz)   08/17/17 89.1 kg (196 lb 6.4 oz)     Per patient on home scale:  5/10/21 220.5 lbs  5/26/21 217.5 lbs  6/16/21 215.5 lbs  7/20/21 216 lbs      IBW:120 # +/- 10%  %IBW: 180% +/- 10%    BMI: 37.1  kg/M2 Category: obese          NUTRITION ASSESSMENT:    FOOD ALLERGIES/INTOLERANCES: no known food allergies        Octavia Farr has tracked very sporadically on [employer]. She states she eats the same two options for breakfast and lunch, dinner varies the most and is generally from a restaurant. She would like to work on sticking with meals that are within her carbohydrate goals more consistently than she has been. She is open to a set meal plan to follow as a trial to see how her blood sugars respond. RD reviewed with her the meals /snacks commonly consumed. INITIAL NUTRITION DIAGNOSIS:  Food and nutrition related knowledge deficit related to new diagnosis of diabetes as evidenced by pt request for RD education and counseling.      NUTRITION DIAGNOSIS 2:  Limited adherence to nutrition recommendations related to lack of self efficacy for making change and stage of change in contemplation / preparation as evidenced by food choices and eating pattern not aligned with recommended nutrition prescription. for dinners and snacks and has not started exercise        ESTIMATED ENERGY NEEDS:  2115  (using [address] with AF 1.3) for weight maintenance; - 500 for weight loss at rate of 1 lb per week    NUTRITION INTERVENTION:  Nutrition 60 minute follow-up one-on-one education & goal setting with Octavia. Reviewed with Nadja previous goals and plans established at last visit, any real or perceived barriers to obtaining the goal / adhering to plan, and progress towards goal made. Collaborated with patient to establish or revise specific nutrient goals as well as specific food / behavior changes that will help patient meet the overall goal of: glycemic control and weight loss    NUTRITION EDUCATION / HANDOUTS PROVIDED:  1600 calorie 2-day individualized meal plan, 3 meals per day at 30 grams carb per meal and snacks 1-15 g carbohydrate. PATIENT GOALS:    Weight Goals:    Short Term Weight Goal: </= 215.5 lbs  Long Term Weight Goal: <200 lbs  (per patient dream weight is 150 lbs)    Nutrition Goals:    1600 calories / day  30-45 grams of carbohydrate / meal    - use provided meal plans at least 5 days per week  - record BG and when checked       Specific tips and techniques to facilitate compliance with above recommendations were provided and discussed. Octavia Farr verbalized understanding. The patient was encouraged to contact me as needed. Follow-up with MARIE to be scheduled              Henrietta Ortez RD, [facility] is a 55 y.o. female being evaluated by a Virtual Visit (video visit) encounter to address concerns as mentioned above. A caregiver was present when appropriate.  Due to this being a TeleHealth encounter (During [place] public health emergency), evaluation of the following areas was limited: Nutrition Focused Physical Exam. Pursuant to the emergency declaration under the [facility], [address] [place] [address] authority and the Coronavirus Preparedness and [employer] Act, this Virtual Visit was conducted with patient's (and/or legal guardian's) consent, to reduce the risk of exposure to COVID-19 and provide necessary medical care. Services were provided through a video synchronous discussion virtually to substitute for in-person encounter. --Frankey Brasil, RD on 7/20/2021 at 1:32 PM    An electronic signature was used to authenticate this note.

## 2021-07-26 ENCOUNTER — OFFICE VISIT (OUTPATIENT)
Dept: SURGERY | Age: 46
End: 2021-07-26
Payer: COMMERCIAL

## 2021-07-26 VITALS
HEIGHT: 64 IN | BODY MASS INDEX: 37.22 KG/M2 | RESPIRATION RATE: 18 BRPM | DIASTOLIC BLOOD PRESSURE: 85 MMHG | TEMPERATURE: 98.8 F | SYSTOLIC BLOOD PRESSURE: 132 MMHG | HEART RATE: 85 BPM | WEIGHT: 218 LBS | OXYGEN SATURATION: 97 %

## 2021-07-26 DIAGNOSIS — D17.1 LIPOMA OF ANTERIOR CHEST WALL: Primary | ICD-10-CM

## 2021-07-26 PROCEDURE — 99202 OFFICE O/P NEW SF 15 MIN: CPT | Performed by: SURGERY

## 2021-07-26 NOTE — LETTER
7/26/2021    Patient: Kathleen Beltre   YOB: 1975   Date of Visit: 7/26/2021     Andre Gomes PA-C  [address] [facility] [address]  [address] [place]    Dear Andre Gomes PA-C,      Thank you for referring Ms. Octavia Farr to [address] [facility] for evaluation. My notes for this consultation are attached. If you have questions, please do not hesitate to call me. I look forward to following your patient along with you.       Sincerely,    Lion Rodriguez MD

## 2021-08-03 ENCOUNTER — HOSPITAL ENCOUNTER (OUTPATIENT)
Dept: CARDIAC REHAB | Age: 46
Discharge: HOME | End: 2021-08-03
Attending: PHYSICIAN ASSISTANT
Payer: COMMERCIAL

## 2021-08-03 PROCEDURE — 97803 MED NUTRITION INDIV SUBSEQ: CPT | Performed by: DIETITIAN, REGISTERED

## 2021-08-03 NOTE — TELEMEDICINE
Octavia Farr was informed of the inherent limitations of a virtual visit,  and has consented to a virtual therapy visit on 8/3/2021. Information regarding emergency contact information for this patient during this visit is to contact: Junior Tirado  at [phone] in addition to calling 911. The patient was informed that at any time during the virtual visit, they can decide to stop the virtual visit. The patient verified that they are physically located in the [facility] for this virtual visit. [address] [facility] NUTRITION CLINIC NOTE  DATE: 8/3/2021      REFERRING PHYSICIAN: Leydi Schumacher    NAME: Octavia Farr : 1975 AGE: 55 y.o.   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    Chronic UTI N39.0    Insomnia G47.00    Recurrent depression (HCC) F33.9    Severe obesity (HCC) E66.01    Essential hypertension I10    Type 2 diabetes mellitus without complication, without long-term current use of insulin (HCC) E11.9    Grief F43.21    Lipoma of anterior chest wall D17.1       LABS:   Lab Results   Component Value Date/Time    Cholesterol, total 175 10/05/2020 10:35 AM    HDL Cholesterol 47 10/05/2020 10:35 AM    LDL, calculated 88 10/05/2020 10:35 AM    LDL, calculated 65 10/23/2018 09:27 AM    VLDL, calculated 40 10/05/2020 10:35 AM    VLDL, calculated 44 (H) 10/23/2018 09:27 AM    Triglyceride 243 (H) 10/05/2020 10:35 AM     Lab Results   Component Value Date/Time    Hemoglobin A1c 6.9 (H) 2021 08:51 AM     Per patient A1c was 7.6 when checked by OB/GYN on 5/3/21    SMBG fasting and 1 hour after meals per endocrinologist, typically forgets to check after dinner:    8/2/21  Morning-238  After breakfast-249  After lunch-210    9/1/21  Morning-193 (slept until 11:30)  After breakfast-180    7/31/21  Morning-253  After breakfast-331    7/29/21  Morning-229  After breakfast-288  After lunch-182  After dinner-279    7/28/21  Morning-211  After breakfast-249  After lunch-168  After dinner-231    7/27/21  Morning-260  After breakfast-295  After lunch-206  After dinner-197    7/26/21  Morning-missed  After breakfast-325  After lunch-251  After dinner-missed      MEDICATIONS/SUPPLEMENTS:   [unfilled]  Prior to Admission medications    Medication Sig Start Date End Date Taking? Authorizing Provider   cholecalciferol (Vitamin D3) 25 mcg (1,000 unit) cap Take  by mouth daily. Provider, Historical   metFORMIN (GLUCOPHAGE) 500 mg tablet TAKE 1 TABLET BY MOUTH TWICE DAILY WITH MEALS 4/2/21   Erin Weinstein PA-C   esomeprazole (NexIUM) 20 mg capsule Take 20 mg by mouth daily. Provider, Historical   aspirin delayed-release 81 mg tablet Take 81 mg by mouth daily. Provider, Historical   Trintellix 10 mg tablet  10/13/20   Provider, Historical   fexofenadine-pseudoephedrine (ALLEGRA-D 24 HOUR) 180-240 mg per tablet Take 1 Tab by mouth daily. Provider, Historical   REXULTI 1 mg tab tablet  3/14/19   Provider, Historical   multivitamin (ONE A DAY) tablet Take 1 Tab by mouth daily. Provider, Historical     Taking Vitamin D also; metformin was changed to XR 2000 mg once a day (taking in the morning) as of 5/27/21.     No longer taking QNASL    EXERCISE/PHYSICAL ACTIVITY:  Purchased a stationary bike but using it inconsistently (used it once in the past month), walking about once a week but no intentional exercise; participated in [employer] 6 years ago; this is no change from last visit (perceived barrier: social schedule & lack of motivation); adopted a puppy in July, not taking for walks yet because she is not yet fully vaccinated    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 usually day trips but now mostly virtual; she lives alone Monday thru Friday then stays with her boyfriend through the weekend    REPORTED WEIGHT HISTORY:  Struggled with weight even as a school aged child; around age 36 decided to make some changes and lost down to size 10/12 now back up to size 18/20        ANTHROPOMETRICS:    Ht Readings from Last 1 Encounters:   07/26/21 5' 4\" (1.626 m)      Wt Readings from Last 10 Encounters:   07/26/21 98.9 kg (218 lb)   10/14/20 93.4 kg (205 lb 12.8 oz)   05/06/19 100.7 kg (222 lb)   04/01/19 101.1 kg (222 lb 12.8 oz)   10/23/18 94.3 kg (207 lb 12.8 oz)   01/30/18 92 kg (202 lb 14.4 oz)   12/13/17 92.9 kg (204 lb 11.2 oz)   12/12/17 92.5 kg (204 lb)   10/20/17 91.2 kg (201 lb 1.6 oz)   10/02/17 91.8 kg (202 lb 6.4 oz)     Per patient on home scale:  5/10/21 220.5 lbs  5/26/21 217.5 lbs  6/16/21 215.5 lbs  7/20/21 216 lbs  8/3/21  Unable to check today    IBW:120 # +/- 10%  %IBW: 180% +/- 10%    BMI: 37.1  kg/M2 Category: obese          NUTRITION ASSESSMENT:    FOOD ALLERGIES/INTOLERANCES: no known food allergies        Octavia AV Farr has tracked a bit more consistently on myfitnesspal since last visit. Carbohydrate intake remains consistently above target (lunches and dinners are 60+ carbohydrates; some meals in the  range). Patient states combination of factors involved, including lack of portion awareness due to eating while working, moods and appetite. States she is not sure at this time she can measure portions, plan meals, follow a lower carbohydrate plan. She wants to focus on maintaining habit of logging her food and blood sugars and revisit additional nutrition goal-setting when ready.   She has some concerns about adding any additional medications to address her high blood sugars but states she is not opposed to new medication at least as a temporary measure until she can improve her lifestyle factors that are impacting her blood sugars. INITIAL NUTRITION DIAGNOSIS:  Food and nutrition related knowledge deficit related to new diagnosis of diabetes as evidenced by pt request for RD education and counseling. NUTRITION DIAGNOSIS 2:  Limited adherence to nutrition recommendations related to lack of self efficacy for making change and stage of change in contemplation / preparation as evidenced by food choices and eating pattern not aligned with recommended nutrition prescription. for dinners and snacks and has not started exercise        ESTIMATED ENERGY NEEDS:  2115  (using [address] with AF 1.3) for weight maintenance; - 500 for weight loss at rate of 1 lb per week    NUTRITION INTERVENTION:  Nutrition 30 minute follow-up one-on-one education & goal setting with Octavia. Reviewed with Jesse Bennett previous goals and plans established at last visit, any real or perceived barriers to obtaining the goal / adhering to plan, and progress towards goal made. Collaborated with patient to establish or revise specific nutrient goals as well as specific food / behavior changes that will help patient meet the overall goal of: glycemic control and weight loss    NUTRITION EDUCATION / HANDOUTS PROVIDED:  No additional handouts provided. Reviewed current plan. Discussed with patient that without lifestyle changes (weight loss / exercise / lower carbohydrates) she would benefit from additional medication as the current regimen is not sufficient to achieve blood glucose in the target range, fasting nor post prandial. Patient verbalized understanding and agreement.  Educated patient briefly on the variety of medication options for glycemic control and encouraged her to discuss with her endocrinologist to find one that is the best fit for her. PATIENT GOALS:    Weight Goals:    Short Term Weight Goal: </= 215.5 lbs  Long Term Weight Goal: <200 lbs  (per patient dream weight is 150 lbs)    Nutrition Goals:    1600 calories / day (Not Met; On Hold)  30-45 grams of carbohydrate / meal (Not Met; On Hold)    - use provided meal plans at least 5 days per week (Not Met; On Hold)  - record BG and when checked (Partially Met; Continue)  - keep detailed food diary for carbohydrate counts per meal/snack (Partially Met; Continue)    Switch metformin ER to evening vs breakfast; if does not improve FBG readings or you are consistently forgetting to take it in the evening then take it when you are most likely to remember. Specific tips and techniques to facilitate compliance with above recommendations were provided and discussed. Octavia Farr verbalized understanding. The patient was encouraged to contact me as needed. Follow-up scheduled 9/13/21 at 4:00 pm               Marcelina Mustafa RD, [facility]  Octavia Farr is a 55 y.o. female being evaluated by a Virtual Visit (video visit) encounter to address concerns as mentioned above. A caregiver was present when appropriate. Due to this being a TeleHealth encounter (During TPXDV-04 public health emergency), evaluation of the following areas was limited: Nutrition Focused Physical Exam. Pursuant to the emergency declaration under the [facility], [phone] waiver authority and the [employer] and [employer] Act, this Virtual Visit was conducted with patient's (and/or legal guardian's) consent, to reduce the risk of exposure to COVID-19 and provide necessary medical care. Services were provided through a video synchronous discussion virtually to substitute for in-person encounter.     --Marcelina Mustafa RD on 8/3/2021 at 1:32 PM    An electronic signature was used to authenticate this note.

## 2021-09-13 ENCOUNTER — HOSPITAL ENCOUNTER (OUTPATIENT)
Dept: CARDIAC REHAB | Age: 46
Discharge: HOME | End: 2021-09-13
Attending: PHYSICIAN ASSISTANT
Payer: COMMERCIAL

## 2021-09-13 PROCEDURE — 97803 MED NUTRITION INDIV SUBSEQ: CPT | Performed by: DIETITIAN, REGISTERED

## 2021-09-13 NOTE — TELEMEDICINE
Octavia Farr was informed of the inherent limitations of a virtual visit,  and has consented to a virtual therapy visit on 2021. Information regarding emergency contact information for this patient during this visit is to contact: Obi Winston  at [phone] in addition to calling 911. The patient was informed that at any time during the virtual visit, they can decide to stop the virtual visit. The patient verified that they are physically located in the [facility] for this virtual visit. [address] [facility] NUTRITION CLINIC NOTE  DATE: 2021      REFERRING PHYSICIAN: Do Underwood    NAME: Octavia Farr : 1975 AGE: 55 y.o. 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.11    Chronic UTI N39.0    Insomnia G47.00    Recurrent depression (HCC) F33.9    Severe obesity (HCC) E66.01    Essential hypertension I10    Type 2 diabetes mellitus without complication, without long-term current use of insulin (HCC) E11.9    Grief F43.21    Lipoma of anterior chest wall D17.1       LABS:   Lab Results   Component Value Date/Time    Cholesterol, total 175 10/05/2020 10:35 AM    HDL Cholesterol 47 10/05/2020 10:35 AM    LDL, calculated 88 10/05/2020 10:35 AM    LDL, calculated 65 10/23/2018 09:27 AM    VLDL, calculated 40 10/05/2020 10:35 AM    VLDL, calculated 44 (H) 10/23/2018 09:27 AM    Triglyceride 243 (H) 10/05/2020 10:35 AM     Lab Results   Component Value Date/Time    Hemoglobin A1c 6.9 (H) 2021 08:51 AM     Per patient A1c was 7.6 when checked by OB/GYN on 5/3/21    SMBG inconsistently. Last checked last week.  Patient has noted improved BG readings since initiating Trulicity. MEDICATIONS/SUPPLEMENTS:   [unfilled]  Prior to Admission medications    Medication Sig Start Date End Date Taking? Authorizing Provider   cholecalciferol (Vitamin D3) 25 mcg (1,000 unit) cap Take  by mouth daily. Provider, Historical   metFORMIN (GLUCOPHAGE) 500 mg tablet TAKE 1 TABLET BY MOUTH TWICE DAILY WITH MEALS 4/2/21   Jareth Newman PA-C   esomeprazole (NexIUM) 20 mg capsule Take 20 mg by mouth daily. Provider, Historical   aspirin delayed-release 81 mg tablet Take 81 mg by mouth daily. Provider, Historical   Trintellix 10 mg tablet  10/13/20   Provider, Historical   fexofenadine-pseudoephedrine (ALLEGRA-D 24 HOUR) 180-240 mg per tablet Take 1 Tab by mouth daily. Provider, Historical   REXULTI 1 mg tab tablet  3/14/19   Provider, Historical   multivitamin (ONE A DAY) tablet Take 1 Tab by mouth daily. Provider, Historical     Taking Vitamin D also; metformin was changed to XR 2000 mg once a day (taking in the morning) as of 5/27/21.     No longer taking QNASL    Started Trulicity on 5/55 and titrating up to therapeutic dose    EXERCISE/PHYSICAL ACTIVITY:  Purchased a stationary bike but using it inconsistently (used it once in the past month), walking about once a week but no intentional exercise; participated in [employer] 6 years ago; this is no change from last visit (perceived barrier: social schedule & lack of motivation); adopted a puppy in July, not taking for walks yet because she is not yet fully vaccinated    ALCOHOL / TOBACCO USE: Alcohol intake varies at most 2-3 nights per week; Quit smoking 6 years ago    SOCIAL HISTORY: was a  but now working as an educational ; [facility]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 usually day trips but now mostly virtual; she lives alone Monday thru Friday then stays with her boyfriend through the weekend    REPORTED WEIGHT HISTORY:  Struggled with weight even as a school aged child; around age 36 decided to make some changes and lost down to size 10/12 now back up to size 18/20        ANTHROPOMETRICS:    Ht Readings from Last 1 Encounters:   07/26/21 5' 4\" (1.626 m)      Wt Readings from Last 10 Encounters:   07/26/21 98.9 kg (218 lb)   10/14/20 93.4 kg (205 lb 12.8 oz)   05/06/19 100.7 kg (222 lb)   04/01/19 101.1 kg (222 lb 12.8 oz)   10/23/18 94.3 kg (207 lb 12.8 oz)   01/30/18 92 kg (202 lb 14.4 oz)   12/13/17 92.9 kg (204 lb 11.2 oz)   12/12/17 92.5 kg (204 lb)   10/20/17 91.2 kg (201 lb 1.6 oz)   10/02/17 91.8 kg (202 lb 6.4 oz)     Per patient on home scale:  5/10/21 220.5 lbs  5/26/21 217.5 lbs  6/16/21 215.5 lbs  7/20/21 216 lbs  8/3/21  Unable to check today  9/13/21 212 lbs    IBW:120 # +/- 10%  %IBW: 177% +/- 10%    BMI: 36.4 kg/M2 Category: obese    Weight Change Since Initial Consult: -8.5 lbs      NUTRITION ASSESSMENT:    FOOD ALLERGIES/INTOLERANCES: no known food allergies        Octavia Farr reports decreased appetite and mild nausea since starting Trulicity; denies severe abdominal pain and reports vomiting only after initial dose one time. She kept a food diary last week on [employer]. She notes without routine accountability she lacks incentive to keep a food or blood sugar log. Some days lunch was skipped and dinner portions reduced by half due to her decreased appetite. She notes she is learning to become comfortable with stopping when full even if there is food still on the plate. INITIAL NUTRITION DIAGNOSIS:  Food and nutrition related knowledge deficit related to new diagnosis of diabetes as evidenced by pt request for RD education and counseling. NUTRITION DIAGNOSIS 2:  Limited adherence to nutrition recommendations related to lack of self efficacy for making change and stage of change in contemplation / preparation as evidenced by food choices and eating pattern not aligned with recommended nutrition prescription. for dinners and snacks and has not started exercise        ESTIMATED ENERGY NEEDS:  2115  (using [address] with AF 1.3) for weight maintenance; - 500 for weight loss at rate of 1 lb per week    NUTRITION INTERVENTION:  Nutrition 30 minute follow-up one-on-one education & goal setting with Octavia. Reviewed with Oliverio Gandara previous goals and plans established at last visit, any real or perceived barriers to obtaining the goal / adhering to plan, and progress towards goal made. Collaborated with patient to establish or revise specific nutrient goals as well as specific food / behavior changes that will help patient meet the overall goal of: glycemic control and weight loss    NUTRITION EDUCATION / HANDOUTS PROVIDED:  - \"Smart Choices When Dining Out\" [facility])      PATIENT GOALS:    Weight Goals:    Short Term Weight Goal: </= 215.5 lbs  Long Term Weight Goal: <200 lbs  (per patient \"dream weigh\"t is 150 lbs)    Nutrition Goals:    1600 calories / day (Not Met; On Hold)  30-45 grams of carbohydrate / meal (Not Met; On Hold)    - use provided meal plans at least 5 days per week (Not Met; On Hold)  - record BG and when checked (Partially Met; Continue)  - keep detailed food diary for carbohydrate counts per meal/snack (Partially Met; Continue)  - use smaller plates / bowls; ask for half of restaurant meal to be pre-packaged in \"to go\" box to help facilitate portion control (New)    Specific tips and techniques to facilitate compliance with above recommendations were provided and discussed. Octavia Daughertywell verbalized understanding. The patient was encouraged to contact me as needed. Follow-up scheduled in 2 weeks:  Monday 9/27/21              Toni Choe RD, [facility]  Octavia Farr is a 55 y.o. female being evaluated by a Virtual Visit (video visit) encounter to address concerns as mentioned above. A caregiver was present when appropriate.  Due to this being a TeleHealth encounter (During XGNSX-21 public health emergency), evaluation of the following areas was limited: Nutrition Focused Physical Exam. Pursuant to the emergency declaration under the [address] [place] [address], [facility] waiver authority and the Coronavirus Preparedness and [employer] Act, this Virtual Visit was conducted with patient's (and/or legal guardian's) consent, to reduce the risk of exposure to COVID-19 and provide necessary medical care. Services were provided through a video synchronous discussion virtually to substitute for in-person encounter. --Pricilla Frausto RD on 9/13/2021 at 1:32 PM    An electronic signature was used to authenticate this note.

## 2021-09-27 ENCOUNTER — HOSPITAL ENCOUNTER (OUTPATIENT)
Dept: CARDIAC REHAB | Age: 46
Discharge: HOME | End: 2021-09-27
Attending: PHYSICIAN ASSISTANT
Payer: COMMERCIAL

## 2021-09-27 PROCEDURE — 97803 MED NUTRITION INDIV SUBSEQ: CPT | Performed by: DIETITIAN, REGISTERED

## 2021-09-27 NOTE — TELEMEDICINE
Octavia Farr was informed of the inherent limitations of a virtual visit,  and has consented to a virtual therapy visit on 2021. Information regarding emergency contact information for this patient during this visit is to contact: Arun Agrawal  at [phone] in addition to calling 911. The patient was informed that at any time during the virtual visit, they can decide to stop the virtual visit. The patient verified that they are physically located in the [facility] for this virtual visit. [address] [facility] NUTRITION CLINIC NOTE  DATE: 2021      REFERRING PHYSICIAN: Marleny Common    NAME: Octavia Farr : 1975 AGE: 55 y.o.   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    Chronic UTI N39.0    Insomnia G47.00    Recurrent depression (HCC) F33.9    Severe obesity (HCC) E66.01    Essential hypertension I10    Type 2 diabetes mellitus without complication, without long-term current use of insulin (HCC) E11.9    Grief F43.21    Lipoma of anterior chest wall D17.1       LABS:   Lab Results   Component Value Date/Time    Cholesterol, total 175 10/05/2020 10:35 AM    HDL Cholesterol 47 10/05/2020 10:35 AM    LDL, calculated 88 10/05/2020 10:35 AM    LDL, calculated 65 10/23/2018 09:27 AM    VLDL, calculated 40 10/05/2020 10:35 AM    VLDL, calculated 44 (H) 10/23/2018 09:27 AM    Triglyceride 243 (H) 10/05/2020 10:35 AM     Lab Results   Component Value Date/Time    Hemoglobin A1c 6.9 (H) 2021 08:51 AM     Per patient A1c was 7.6 when checked by OB/GYN on 5/3/21    Patient has noted improved BG readings since initiating Trulicity. Fairly consistent with checking FBG now on weekdays, but not consistent with evening or weekend BG checks. FBG readings: 177, 140, 136, 150, 166        MEDICATIONS/SUPPLEMENTS:   [unfilled]  Prior to Admission medications    Medication Sig Start Date End Date Taking? Authorizing Provider   cholecalciferol (Vitamin D3) 25 mcg (1,000 unit) cap Take  by mouth daily. Provider, Historical   metFORMIN (GLUCOPHAGE) 500 mg tablet TAKE 1 TABLET BY MOUTH TWICE DAILY WITH MEALS 4/2/21   Nohemi Lilly PA-C   esomeprazole (NexIUM) 20 mg capsule Take 20 mg by mouth daily. Provider, Historical   aspirin delayed-release 81 mg tablet Take 81 mg by mouth daily. Provider, Historical   Trintellix 10 mg tablet  10/13/20   Provider, Historical   fexofenadine-pseudoephedrine (ALLEGRA-D 24 HOUR) 180-240 mg per tablet Take 1 Tab by mouth daily. Provider, Historical   REXULTI 1 mg tab tablet  3/14/19   Provider, Historical   multivitamin (ONE A DAY) tablet Take 1 Tab by mouth daily. Provider, Historical     Taking Vitamin D also; metformin was changed to XR 2000 mg once a day (taking in the morning) as of 5/27/21. No longer taking QNASL    Started Trulicity on 5/21, reached max therapeutic dose 9/20. EXERCISE/PHYSICAL ACTIVITY: Owns a stationary bike but rarely uses, walks about once a week but no intentional exercise; participated in [employer] 6 years ago; this is no change from last visit (perceived barrier: social schedule & lack of motivation); adopted a puppy in July and hopes to take more walks now that weather is cooling down and working on training the dog.     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 usually day trips but now mostly virtual; she lives alone Monday thru Friday then stays with her boyfriend through the weekend    REPORTED WEIGHT HISTORY:  Struggled with weight even as a school aged child; around age 36 decided to make some changes and lost down to size 10/12 now back up to size 18/20        ANTHROPOMETRICS:    Ht Readings from Last 1 Encounters:   07/26/21 5' 4\" (1.626 m)      Wt Readings from Last 10 Encounters:   07/26/21 98.9 kg (218 lb)   10/14/20 93.4 kg (205 lb 12.8 oz)   05/06/19 100.7 kg (222 lb)   04/01/19 101.1 kg (222 lb 12.8 oz)   10/23/18 94.3 kg (207 lb 12.8 oz)   01/30/18 92 kg (202 lb 14.4 oz)   12/13/17 92.9 kg (204 lb 11.2 oz)   12/12/17 92.5 kg (204 lb)   10/20/17 91.2 kg (201 lb 1.6 oz)   10/02/17 91.8 kg (202 lb 6.4 oz)     Per patient on home scale:  5/10/21 220.5 lbs  5/26/21 217.5 lbs  6/16/21 215.5 lbs  7/20/21 216 lbs  8/3/21  Unable to check today  9/13/21 212 lbs  9/27/21 211.5 lbs    IBW:120 # +/- 10%  %IBW: 176% +/- 10%    BMI: 36.3 kg/M2 Category: obese    Weight Change Since Initial Consult: -9 lbs      NUTRITION ASSESSMENT:    FOOD ALLERGIES/INTOLERANCES: no known food allergies        Octavia AV Farr reports decreased appetite and mild nausea since starting Trulicity; denies severe abdominal pain and reports vomiting only after initial dose. Tracked weekday breakfast and lunch, occasionally dinner, on [employer] along with BG readings and these were reviewed. Food logs reflect smaller portions and fewer snacks, related to the decreased appetite. Octavia states her goal is to aim for 64 oz of water daily and record this and work on checking her evening blood sugars. INITIAL NUTRITION DIAGNOSIS:  Food and nutrition related knowledge deficit related to new diagnosis of diabetes as evidenced by pt request for RD education and counseling.      NUTRITION DIAGNOSIS 2:  Limited adherence to nutrition recommendations related to lack of self efficacy for making change and stage of change in contemplation / preparation as evidenced by food choices and eating pattern not aligned with recommended nutrition prescription. for dinners and snacks and has not started exercise        ESTIMATED ENERGY NEEDS:  2115  (using [address] with AF 1.3) for weight maintenance; - 500 for weight loss at rate of 1 lb per week    NUTRITION INTERVENTION:  Nutrition 30 minute follow-up one-on-one education & goal setting with Octavia. Reviewed with Les Kirkland previous goals and plans established at last visit, any real or perceived barriers to obtaining the goal / adhering to plan, and progress towards goal made. Collaborated with patient to establish or revise specific nutrient goals as well as specific food / behavior changes that will help patient meet the overall goal of: glycemic control and weight loss    NUTRITION EDUCATION / HANDOUTS PROVIDED:        PATIENT GOALS:    Weight Goals:    Short Term Weight Goal: </= 215.5 lbs (met)  Long Term Weight Goal: <200 lbs  (per patient \"dream weigh\"t is 150 lbs)    Nutrition Goals:    1600 calories / day (Not Met; On Hold)  30-45 grams of carbohydrate / meal (Not Met; On Hold)    - use provided meal plans at least 5 days per week (Not Met; On Hold)  - record BG and when checked (Partially Met; Continue)  - keep detailed food diary for carbohydrate counts per meal/snack (Partially Met; Continue)  - use smaller plates / bowls; ask for half of restaurant meal to be pre-packaged in \"to go\" box to help facilitate portion control (New)  - track water intake and aim for 64 oz / day (New)    Specific tips and techniques to facilitate compliance with above recommendations were provided and discussed. Octavia Farr verbalized understanding. The patient was encouraged to contact me as needed. Follow-up scheduled in 2 weeks. Eric Joyner RD, [facility] [place][note 2]  Octavia Farr is a 55 y.o. female being evaluated by a Virtual Visit (video visit) encounter to address concerns as mentioned above.   A caregiver was present when appropriate. Due to this being a TeleHealth encounter (During [place] public health emergency), evaluation of the following areas was limited: Nutrition Focused Physical Exam. Pursuant to the emergency declaration under the [address] [place] [address] and the [employer] Act, this Virtual Visit was conducted with patient's (and/or legal guardian's) consent, to reduce the risk of exposure to COVID-19 and provide necessary medical care. Services were provided through a video synchronous discussion virtually to substitute for in-person encounter. --Jose Clemens RD on 9/27/2021 at 1:32 PM    An electronic signature was used to authenticate this note.

## 2021-10-14 ENCOUNTER — HOSPITAL ENCOUNTER (OUTPATIENT)
Dept: CARDIAC REHAB | Age: 46
Discharge: HOME | End: 2021-10-14
Attending: PHYSICIAN ASSISTANT
Payer: COMMERCIAL

## 2021-10-14 ENCOUNTER — OFFICE VISIT (OUTPATIENT)
Dept: INTERNAL MEDICINE CLINIC | Age: 46
End: 2021-10-14
Payer: COMMERCIAL

## 2021-10-14 VITALS
HEIGHT: 64 IN | DIASTOLIC BLOOD PRESSURE: 83 MMHG | HEART RATE: 88 BPM | BODY MASS INDEX: 36.37 KG/M2 | WEIGHT: 213 LBS | RESPIRATION RATE: 20 BRPM | OXYGEN SATURATION: 97 % | TEMPERATURE: 98.9 F | SYSTOLIC BLOOD PRESSURE: 121 MMHG

## 2021-10-14 DIAGNOSIS — N30.01 ACUTE CYSTITIS WITH HEMATURIA: Primary | ICD-10-CM

## 2021-10-14 DIAGNOSIS — B37.31 VAGINAL YEAST INFECTION: ICD-10-CM

## 2021-10-14 LAB
BILIRUB UR QL STRIP: NEGATIVE
GLUCOSE UR-MCNC: NORMAL MG/DL
KETONES P FAST UR STRIP-MCNC: NEGATIVE MG/DL
PH UR STRIP: 7 [PH] (ref 4.6–8)
PROT UR QL STRIP: NEGATIVE
SP GR UR STRIP: 1.02 (ref 1–1.03)
UA UROBILINOGEN AMB POC: NORMAL (ref 0.2–1)
URINALYSIS CLARITY POC: NORMAL
URINALYSIS COLOR POC: NORMAL
URINE BLOOD POC: NORMAL
URINE LEUKOCYTES POC: NORMAL
URINE NITRITES POC: POSITIVE

## 2021-10-14 PROCEDURE — 81002 URINALYSIS NONAUTO W/O SCOPE: CPT | Performed by: PHYSICIAN ASSISTANT

## 2021-10-14 PROCEDURE — 97803 MED NUTRITION INDIV SUBSEQ: CPT | Performed by: DIETITIAN, REGISTERED

## 2021-10-14 PROCEDURE — 99213 OFFICE O/P EST LOW 20 MIN: CPT | Performed by: PHYSICIAN ASSISTANT

## 2021-10-14 RX ORDER — FLUCONAZOLE 150 MG/1
150 TABLET ORAL DAILY
Qty: 1 TABLET | Refills: 1 | Status: SHIPPED | OUTPATIENT
Start: 2021-10-14 | End: 2021-10-15

## 2021-10-14 RX ORDER — DULAGLUTIDE 1.5 MG/.5ML
1.5 INJECTION, SOLUTION SUBCUTANEOUS
COMMUNITY
Start: 2021-08-18 | End: 2021-11-19

## 2021-10-14 RX ORDER — SULFAMETHOXAZOLE AND TRIMETHOPRIM 800; 160 MG/1; MG/1
1 TABLET ORAL 2 TIMES DAILY
Qty: 10 TABLET | Refills: 0 | Status: SHIPPED | OUTPATIENT
Start: 2021-10-14 | End: 2021-11-19 | Stop reason: ALTCHOICE

## 2021-10-14 NOTE — PROGRESS NOTES
Chief Complaint   Patient presents with    Urinary Frequency    Urinary Burning    Yeast Infection     she is a 55y.o. year old female who presents for evaluation. Patient presents to the office for evaluation of uti and yeast infection. She states she woke up in the early morning with frequency, burning, and urgency. She reports she is not having abdominal pain. She also is having some symptoms of a yeast infection. She reports that she has been having some vaginal itching. The patient states that her diabetes is not controlled but it has improved. Also the patient shares that she is not sure if the lubricant that she uses when she is intimate is contributing to her vaginal yeast infections and/or UTIs. Reviewed PmHx, RxHx, FmHx, SocHx, AllgHx and updated and dated in the chart. Review of Systems - negative except as listed above    Objective:     Vitals:    10/14/21 1058   BP: 121/83   Pulse: 88   Resp: 20   Temp: 98.9 °F (37.2 °C)   TempSrc: Temporal   SpO2: 97%   Weight: 213 lb (96.6 kg)   Height: 5' 4\" (1.626 m)     Physical Examination: General appearance - alert, well appearing, and in no distress  Mental status - normal mood, behavior, speech, dress, motor activity, and thought processes  Abdomen - soft, nontender, nondistended, no masses or organomegaly    Assessment/ Plan:   Diagnoses and all orders for this visit:    1. Acute cystitis with hematuria  -     trimethoprim-sulfamethoxazole (BACTRIM DS, SEPTRA DS) 160-800 mg per tablet; Take 1 Tablet by mouth two (2) times a day. 2. Vaginal yeast infection  -     fluconazole (DIFLUCAN) 150 mg tablet; Take 1 Tablet by mouth daily for 1 day. FDA advises cautious prescribing of oral fluconazole in pregnancy. Patient's urinary tract infection will be treated today. Also she has been sent Diflucan to take for vaginal yeast infection.   She and I spoke again about the importance of getting her diabetes under control because this would help with the frequency of yeast infections. Also have advised the patient to keep her appointment that she has with her GYN doctor on the 25th. This will allow for her to ask about suggestions for lubrication that may not cause UTI or yeast infections      I have discussed the diagnosis with the patient and the intended plan as seen in the above orders. The patient has received an after-visit summary and questions were answered concerning future plans.      Medication Side Effects and Warnings were discussed with patient: yes  Patient Labs were reviewed and or requested: yes  Patient Past Records were reviewed and or requested  yes    Guadalupe Beaver PA-C

## 2021-10-14 NOTE — TELEMEDICINE
Octavia Farr was informed of the inherent limitations of a virtual visit,  and has consented to a virtual therapy visit on 10/14/2021. Information regarding emergency contact information for this patient during this visit is to contact: Sofy Jc  at [phone] in addition to calling 911. The patient was informed that at any time during the virtual visit, they can decide to stop the virtual visit. The patient verified that they are physically located in the [facility] for this virtual visit. [address] [facility] NUTRITION CLINIC NOTE  DATE: 10/14/2021      REFERRING PHYSICIAN: Jailene Antunez    NAME: Octavia Farr : 1975 AGE: 55 y.o.   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.11    Chronic UTI N39.0    Insomnia G47.00    Recurrent depression (HCC) F33.9    Severe obesity (HCC) E66.01    Essential hypertension I10    Type 2 diabetes mellitus without complication, without long-term current use of insulin (HCC) E11.9    Grief F43.21    Lipoma of anterior chest wall D17.1       LABS:   Lab Results   Component Value Date/Time    Cholesterol, total 175 10/05/2020 10:35 AM    HDL Cholesterol 47 10/05/2020 10:35 AM    LDL, calculated 88 10/05/2020 10:35 AM    LDL, calculated 65 10/23/2018 09:27 AM    VLDL, calculated 40 10/05/2020 10:35 AM    VLDL, calculated 44 (H) 10/23/2018 09:27 AM    Triglyceride 243 (H) 10/05/2020 10:35 AM     Lab Results   Component Value Date/Time    Hemoglobin A1c 6.9 (H) 2021 08:51 AM     Per patient A1c was 7.6 when checked by OB/GYN on 5/3/21    Patient has noted improved BG readings since initiating Trulicity. Fairly consistent with checking FBG and 1 hour post prandial BG after breakfast and lunch on weekdays, but not consistent with evening or weekend BG checks. BG readings:  10/14  , 1 hour   10/13  , 1 hour   10/12  , 1 hour , 1 hour PP lunch 159  10/08  , 1 hour         MEDICATIONS/SUPPLEMENTS:   [unfilled]  Prior to Admission medications    Medication Sig Start Date End Date Taking? Authorizing Provider   cholecalciferol (Vitamin D3) 25 mcg (1,000 unit) cap Take  by mouth daily. Provider, Historical   metFORMIN (GLUCOPHAGE) 500 mg tablet TAKE 1 TABLET BY MOUTH TWICE DAILY WITH MEALS 4/2/21   Isaac Liu PA-C   esomeprazole (NexIUM) 20 mg capsule Take 20 mg by mouth daily. Provider, Historical   aspirin delayed-release 81 mg tablet Take 81 mg by mouth daily. Provider, Historical   Trintellix 10 mg tablet  10/13/20   Provider, Historical   fexofenadine-pseudoephedrine (ALLEGRA-D 24 HOUR) 180-240 mg per tablet Take 1 Tab by mouth daily. Provider, Historical   REXULTI 1 mg tab tablet  3/14/19   Provider, Historical   multivitamin (ONE A DAY) tablet Take 1 Tab by mouth daily. Provider, Historical     Taking Vitamin D also; metformin was changed to XR 2000 mg once a day (taking in the morning) as of 5/27/21. No longer taking QNASL    Started Trulicity on 7/72, reached max therapeutic dose 9/20 but due to nausea with the consent of her doctor has reduced to a lower dose and nausea has improved though not completely resolved    EXERCISE/PHYSICAL ACTIVITY: Owns a stationary bike but rarely uses, walks about once a week but no intentional exercise; participated in [employer] 6 years ago; this is no change from last visit (perceived barrier: social schedule & lack of motivation); adopted a puppy in July 2021 and hopes to take more walks now that weather is cooling down and working on training the dog.     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 usually day trips but now mostly virtual; she lives alone Monday thru Friday then stays with her boyfriend through the weekend    REPORTED WEIGHT HISTORY:  Struggled with weight even as a school aged child; around age 36 decided to make some changes and lost down to size 10/12 now back up to size 18/20        ANTHROPOMETRICS:    Ht Readings from Last 1 Encounters:   07/26/21 5' 4\" (1.626 m)      Wt Readings from Last 10 Encounters:   07/26/21 98.9 kg (218 lb)   10/14/20 93.4 kg (205 lb 12.8 oz)   05/06/19 100.7 kg (222 lb)   04/01/19 101.1 kg (222 lb 12.8 oz)   10/23/18 94.3 kg (207 lb 12.8 oz)   01/30/18 92 kg (202 lb 14.4 oz)   12/13/17 92.9 kg (204 lb 11.2 oz)   12/12/17 92.5 kg (204 lb)   10/20/17 91.2 kg (201 lb 1.6 oz)   10/02/17 91.8 kg (202 lb 6.4 oz)     Per patient on home scale:  5/10/21 220.5 lbs  5/26/21 217.5 lbs  6/16/21 215.5 lbs  7/20/21 216 lbs  8/3/21  Unable to check today  9/13/21 212 lbs  9/27/21 211.5 lbs  10/14/21 207.5 lbs    IBW:120 # +/- 10%  %IBW: 173% +/- 10%    BMI: 35.6 kg/M2 Category: obese    Weight Change Since Initial Consult: -13 lbs      NUTRITION ASSESSMENT:    FOOD ALLERGIES/INTOLERANCES: no known food allergies        Octavia Farr reports decreased appetite and mild nausea since starting Trulicity. Tracked weekday food intake and BG readings on [employer] and these were reviewed. Goals of tracking water intake and aiming for 64 oz not met. Goal of asking for half portions of restaurant meals not met, though Octavia states she is still eating smaller portions on some occasions.  Carb intake at meals varies considerably from 20 to 100+ per meal.       INITIAL NUTRITION DIAGNOSIS:  Food and nutrition related knowledge deficit related to new diagnosis of diabetes as evidenced by pt request for RD education and counseling. NUTRITION DIAGNOSIS 2:  Limited adherence to nutrition recommendations related to lack of self efficacy for making change and stage of change in contemplation / preparation as evidenced by food choices and eating pattern not aligned with recommended nutrition prescription. for dinners and snacks and has not started exercise        ESTIMATED ENERGY NEEDS:  2115  (using [address] with AF 1.3) for weight maintenance; - 500 for weight loss at rate of 1 lb per week    NUTRITION INTERVENTION:  Nutrition 30 minute follow-up one-on-one education & goal setting with Octavia. Reviewed with Kurt Baumgarten previous goals and plans established at last visit, any real or perceived barriers to obtaining the goal / adhering to plan, and progress towards goal made. Collaborated with patient to establish or revise specific nutrient goals as well as specific food / behavior changes that will help patient meet the overall goal of: glycemic control and weight loss    NUTRITION EDUCATION / HANDOUTS PROVIDED:        PATIENT GOALS:    Weight Goals:    Short Term Weight Goal: </= 215.5 lbs (met)  Long Term Weight Goal: <200 lbs  (per patient \"dream weigh\"t is 150 lbs)    Nutrition Goals:    Goals On Hold:  1600 calories / day (Not Met; On Hold)  30-45 grams of carbohydrate / meal (Not Met; On Hold)  - use provided meal plans at least 5 days per week (Not Met;  On Hold)    Goals In Progress (Not Met or Partially Met):  - record BG and when checked (Partially Met; Continue)  New: Check bedtime BG - leave kit on bedside table as prompt / reminder to check  - keep detailed food diary for carbohydrate counts per meal/snack (Partially Met; Continue) New: Enter foods BEFORE eating  - use smaller plates / bowls; ask for half of restaurant meal to be pre-packaged in \"to go\" box to help facilitate portion control (Not Met; Use as needed for portion control)  - track water intake and aim for 64 oz / day (Not Met; Continue)      Specific tips and techniques to facilitate compliance with above recommendations were provided and discussed. Octavia Farr verbalized understanding. The patient was encouraged to contact me as needed. Follow-up scheduled in 2 weeks. Juan Fajardo RD, [facility] [place]  Octavia Farr is a 55 y.o. female being evaluated by a Virtual Visit (video visit) encounter to address concerns as mentioned above. A caregiver was present when appropriate. Due to this being a TeleHealth encounter (During [place] [address] public health emergency), evaluation of the following areas was limited: Nutrition Focused Physical Exam. Pursuant to the emergency declaration under the [address] [place] [address], [address] [place] [address] authority and the [employer] Act, this Virtual Visit was conducted with patient's (and/or legal guardian's) consent, to reduce the risk of exposure to COVID-19 and provide necessary medical care. Services were provided through a video synchronous discussion virtually to substitute for in-person encounter. --Juan Fajardo RD on 10/14/2021 at 1:32 PM    An electronic signature was used to authenticate this note.

## 2021-10-14 NOTE — PROGRESS NOTES
1. Have you been to the ER, urgent care clinic since your last visit? Hospitalized since your last visit? No    2. Have you seen or consulted any other health care providers outside of the [address] [place] [address] since your last visit? Include any pap smears or colon screening. No    Health Maintenance Due   Topic Date Due    Foot Exam Q1  Never done    Eye Exam Retinal or Dilated  Never done    Cervical cancer screen  06/15/2020    Flu Vaccine (1) 09/01/2021     Patient will receive flu shot at her physical with Roberto Chaves. Patient c/o urinary frequency and burning for x1 day, possible yeast infection, chronic due to diabetes. O-Z Plasty Text: The defect edges were debeveled with a #15 scalpel blade.  Given the location of the defect, shape of the defect and the proximity to free margins an O-Z plasty (double transposition flap) was deemed most appropriate.  Using a sterile surgical marker, the appropriate transposition flaps were drawn incorporating the defect and placing the expected incisions within the relaxed skin tension lines where possible.    The area thus outlined was incised deep to adipose tissue with a #15 scalpel blade.  The skin margins were undermined to an appropriate distance in all directions utilizing iris scissors.  Hemostasis was achieved with electrocautery.  The flaps were then transposed into place, one clockwise and the other counterclockwise, and anchored with interrupted buried subcutaneous sutures.

## 2021-10-16 LAB
BACTERIA SPEC CULT: NORMAL
SERVICE CMNT-IMP: NORMAL

## 2021-10-26 ENCOUNTER — HOSPITAL ENCOUNTER (OUTPATIENT)
Dept: CARDIAC REHAB | Age: 46
Discharge: HOME | End: 2021-10-26
Attending: PHYSICIAN ASSISTANT
Payer: COMMERCIAL

## 2021-10-26 PROCEDURE — 97803 MED NUTRITION INDIV SUBSEQ: CPT | Performed by: DIETITIAN, REGISTERED

## 2021-10-26 NOTE — TELEMEDICINE
Octavia Farr was informed of the inherent limitations of a virtual visit,  and has consented to a virtual therapy visit on 10/26/2021. Information regarding emergency contact information for this patient during this visit is to contact: Rony Wagoner  at [phone] in addition to calling 911. The patient was informed that at any time during the virtual visit, they can decide to stop the virtual visit. The patient verified that they are physically located in the [facility] for this virtual visit. [address] [facility] NUTRITION CLINIC NOTE  DATE: 10/26/2021      REFERRING PHYSICIAN: Aroldo Suarez    NAME: Octavia Farr : 1975 AGE: 55 y.o.   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    Chronic UTI N39.0    Insomnia G47.00    Recurrent depression (HCC) F33.9    Severe obesity (HCC) E66.01    Essential hypertension I10    Type 2 diabetes mellitus without complication, without long-term current use of insulin (HCC) E11.9    Grief F43.21    Lipoma of anterior chest wall D17.1       LABS:   Lab Results   Component Value Date/Time    Cholesterol, total 175 10/05/2020 10:35 AM    HDL Cholesterol 47 10/05/2020 10:35 AM    LDL, calculated 88 10/05/2020 10:35 AM    LDL, calculated 65 10/23/2018 09:27 AM    VLDL, calculated 40 10/05/2020 10:35 AM    VLDL, calculated 44 (H) 10/23/2018 09:27 AM    Triglyceride 243 (H) 10/05/2020 10:35 AM     Lab Results   Component Value Date/Time    Hemoglobin A1c 6.9 (H) 2021 08:51 AM     Per patient A1c was 7.6 when checked by OB/GYN on 5/3/21    Patient has notes BG are not as well controlled since d/c Trulicity and starting Jardiance    SMBG today 171 prior to breakfast, 210 1 hour after breakfast    MEDICATIONS/SUPPLEMENTS:   [unfilled]  Prior to Admission medications    Medication Sig Start Date End Date Taking? Authorizing Provider   dulaglutide (Trulicity) 1.5 OR/0.4 mL sub-q pen 1.5 mg by SubCUTAneous route every seven (7) days. 8/18/21   Provider, Historical   trimethoprim-sulfamethoxazole (BACTRIM DS, SEPTRA DS) 160-800 mg per tablet Take 1 Tablet by mouth two (2) times a day. 10/14/21   Guadalupe Beaevr PA-C   cholecalciferol (Vitamin D3) 25 mcg (1,000 unit) cap Take  by mouth daily. Provider, Historical   metFORMIN (GLUCOPHAGE) 500 mg tablet TAKE 1 TABLET BY MOUTH TWICE DAILY WITH MEALS  Patient taking differently: Take 2,000 mg by mouth nightly. 4/2/21   Gustabo Toro PA-C   esomeprazole (NexIUM) 20 mg capsule Take 20 mg by mouth daily. Provider, Historical   aspirin delayed-release 81 mg tablet Take 81 mg by mouth daily. Provider, Historical   Trintellix 10 mg tablet Take 10 mg by mouth daily. 10/13/20   Provider, Historical   fexofenadine-pseudoephedrine (ALLEGRA-D 24 HOUR) 180-240 mg per tablet Take 1 Tab by mouth daily. Provider, Historical   REXULTI 1 mg tab tablet Take 1 mg by mouth daily. 3/14/19   Provider, Historical   multivitamin (ONE A DAY) tablet Take 1 Tab by mouth daily. Provider, Historical     Taking Vitamin D also; metformin was changed to XR 2000 mg once a day (taking in the morning) as of 5/27/21. Taking 10 mg Jardiance; d/c Trulicity    No longer taking QNASL    EXERCISE/PHYSICAL ACTIVITY: Owns a stationary bike but rarely uses, walks about once a week but no intentional exercise; participated in [employer] 6 years ago; this is no change from last visit (perceived barrier: social schedule & lack of motivation); adopted a puppy in July 2021 and hopes to take more walks now that weather is cooling down and working on training the dog.     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 usually day trips but now mostly virtual; she lives alone Monday thru Friday then stays with her boyfriend through the weekend    REPORTED WEIGHT HISTORY:  Struggled with weight even as a school aged child; around age 36 decided to make some changes and lost down to size 10/12 now back up to size 18/20        ANTHROPOMETRICS:    Ht Readings from Last 1 Encounters:   10/14/21 5' 4\" (1.626 m)      Wt Readings from Last 10 Encounters:   10/14/21 96.6 kg (213 lb)   07/26/21 98.9 kg (218 lb)   10/14/20 93.4 kg (205 lb 12.8 oz)   05/06/19 100.7 kg (222 lb)   04/01/19 101.1 kg (222 lb 12.8 oz)   10/23/18 94.3 kg (207 lb 12.8 oz)   01/30/18 92 kg (202 lb 14.4 oz)   12/13/17 92.9 kg (204 lb 11.2 oz)   12/12/17 92.5 kg (204 lb)   10/20/17 91.2 kg (201 lb 1.6 oz)     Per patient on home scale:  5/10/21 220.5 lbs  5/26/21 217.5 lbs  6/16/21 215.5 lbs  7/20/21 216 lbs  8/3/21  Unable to check today  9/13/21 212 lbs  9/27/21 211.5 lbs  10/14/21 207.5 lbs  10/26/21 207 lbs    IBW:120 # +/- 10%      BMI: 35.5 kg/M2 Category: obese    Weight Change Since Initial Consult: -13.5 lbs      NUTRITION ASSESSMENT:    FOOD ALLERGIES/INTOLERANCES: no known food allergies        Octavia AV Farr has been less consistent with tracking food (only 1 full day recorded) and not routinely checking BG since last check, states she feels more scattered. INITIAL NUTRITION DIAGNOSIS:  Food and nutrition related knowledge deficit related to new diagnosis of diabetes as evidenced by pt request for RD education and counseling.      NUTRITION DIAGNOSIS 2:  Limited adherence to nutrition recommendations related to lack of self efficacy for making change and stage of change in contemplation / preparation as evidenced by food choices and eating pattern not aligned with recommended nutrition prescription. for dinners and snacks and has not started exercise        ESTIMATED ENERGY NEEDS:  2115  (using [address] with AF 1.3) for weight maintenance; - 500 for weight loss at rate of 1 lb per week    NUTRITION INTERVENTION:  Nutrition 30 minute follow-up one-on-one education & goal setting with Octavia. Reviewed with Veronica Vanegas previous goals and plans established at last visit, any real or perceived barriers to obtaining the goal / adhering to plan, and progress towards goal made. Collaborated with patient to establish or revise specific nutrient goals as well as specific food / behavior changes that will help patient meet the overall goal of: glycemic control and weight loss    NUTRITION EDUCATION / HANDOUTS PROVIDED:        PATIENT GOALS:    Weight Goals:    Short Term Weight Goal: </= 215.5 lbs (met)  Long Term Weight Goal: <200 lbs  (per patient \"dream weigh\"t is 150 lbs)    Nutrition Goals:    Goals On Hold:  1600 calories / day (Not Met; On Hold)  30-45 grams of carbohydrate / meal (Not Met; On Hold)  - use provided meal plans at least 5 days per week (Not Met;  On Hold)     (Not Met or Partially Met;on short term hold):  - record BG and when checked (Partially Met; Continue)  New: Check bedtime BG - leave kit on bedside table as prompt / reminder to check  - keep detailed food diary for carbohydrate counts per meal/snack (Partially Met; Continue) New: Enter foods BEFORE eating  - use smaller plates / bowls; ask for half of restaurant meal to be pre-packaged in \"to go\" box to help facilitate portion control (Not Met; Use as needed for portion control)  - track water intake and aim for 64 oz / day (Not Met; Continue)    Goals New:  - check BG before and after breakfast  - eliminate english muffin from breakfast sandwich and double the egg white & turkey sausage      Specific tips and techniques to facilitate compliance with above recommendations were provided and discussed. Octavia Farr verbalized understanding. The patient was encouraged to contact me as needed. Follow-up scheduled in 2 weeks. Agusto Hoyt RD, [facility]  Octavia Farr is a 55 y.o. female being evaluated by a Virtual Visit (video visit) encounter to address concerns as mentioned above. A caregiver was present when appropriate. Due to this being a TeleHealth encounter (During [facility]OUH-46 public health emergency), evaluation of the following areas was limited: Nutrition Focused Physical Exam. Pursuant to the emergency declaration under the [address] [place] [address], [address] [place] [address] authority and the [employer] Act, this Virtual Visit was conducted with patient's (and/or legal guardian's) consent, to reduce the risk of exposure to COVID-19 and provide necessary medical care. Services were provided through a video synchronous discussion virtually to substitute for in-person encounter. --Agusto Hoyt RD on 10/26/2021 at 1:32 PM    An electronic signature was used to authenticate this note.

## 2021-11-11 ENCOUNTER — HOSPITAL ENCOUNTER (OUTPATIENT)
Dept: CARDIAC REHAB | Age: 46
Discharge: HOME | End: 2021-11-11
Attending: PHYSICIAN ASSISTANT
Payer: COMMERCIAL

## 2021-11-11 PROCEDURE — 97803 MED NUTRITION INDIV SUBSEQ: CPT | Performed by: DIETITIAN, REGISTERED

## 2021-11-11 NOTE — TELEMEDICINE
Octavia Farr was informed of the inherent limitations of a virtual visit,  and has consented to a virtual therapy visit on 2021. Information regarding emergency contact information for this patient during this visit is to contact: Gabriele Lance  at [phone] in addition to calling 911. The patient was informed that at any time during the virtual visit, they can decide to stop the virtual visit. The patient verified that they are physically located in the [facility] for this virtual visit. [address] [facility] NUTRITION CLINIC NOTE  DATE: 2021      REFERRING PHYSICIAN: Umberto Oakes    NAME: Octavia Farr : 1975 AGE: 55 y.o.   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    Chronic UTI N39.0    Insomnia G47.00    Recurrent depression (HCC) F33.9    Severe obesity (HCC) E66.01    Essential hypertension I10    Type 2 diabetes mellitus without complication, without long-term current use of insulin (HCC) E11.9    Grief F43.21    Lipoma of anterior chest wall D17.1       LABS:   Lab Results   Component Value Date/Time    Cholesterol, total 175 10/05/2020 10:35 AM    HDL Cholesterol 47 10/05/2020 10:35 AM    LDL, calculated 88 10/05/2020 10:35 AM    LDL, calculated 65 10/23/2018 09:27 AM    VLDL, calculated 40 10/05/2020 10:35 AM    VLDL, calculated 44 (H) 10/23/2018 09:27 AM    Triglyceride 243 (H) 10/05/2020 10:35 AM     Lab Results   Component Value Date/Time    Hemoglobin A1c 6.9 (H) 2021 08:51 AM     Per patient A1c was 7.6 when checked by OB/GYN on 5/3/21    Patient has notes BG are not as well controlled since d/c Trulicity and starting Jardiance    FBG have been 153 today, 151 yesterday (145 after lunch), 159 day before that; had a few non-fasting readings >200    MEDICATIONS/SUPPLEMENTS:   [unfilled]  Prior to Admission medications    Medication Sig Start Date End Date Taking? Authorizing Provider   dulaglutide (Trulicity) 1.5 RG/2.6 mL sub-q pen 1.5 mg by SubCUTAneous route every seven (7) days. 8/18/21   Provider, Historical   trimethoprim-sulfamethoxazole (BACTRIM DS, SEPTRA DS) 160-800 mg per tablet Take 1 Tablet by mouth two (2) times a day. 10/14/21   Guadalupe Beaver PA-C   cholecalciferol (Vitamin D3) 25 mcg (1,000 unit) cap Take  by mouth daily. Provider, Historical   metFORMIN (GLUCOPHAGE) 500 mg tablet TAKE 1 TABLET BY MOUTH TWICE DAILY WITH MEALS  Patient taking differently: Take 2,000 mg by mouth nightly. 4/2/21   Alek Robledo PA-C   esomeprazole (NexIUM) 20 mg capsule Take 20 mg by mouth daily. Provider, Historical   aspirin delayed-release 81 mg tablet Take 81 mg by mouth daily. Provider, Historical   Trintellix 10 mg tablet Take 10 mg by mouth daily. 10/13/20   Provider, Historical   fexofenadine-pseudoephedrine (ALLEGRA-D 24 HOUR) 180-240 mg per tablet Take 1 Tab by mouth daily. Provider, Historical   REXULTI 1 mg tab tablet Take 1 mg by mouth daily. 3/14/19   Provider, Historical   multivitamin (ONE A DAY) tablet Take 1 Tab by mouth daily. Provider, Historical     Taking Vitamin D also; metformin was changed to XR 2000 mg once a day (taking in the morning) as of 5/27/21.     Taking 10 mg Jardiance; d/c Trulicity    No longer taking QNASL    EXERCISE/PHYSICAL ACTIVITY: Owns a stationary bike but rarely uses, walks about once a week but no intentional exercise; participated in [employer] 6 years ago; this is no change from last visit (perceived barrier: social schedule & lack of motivation); adopted a puppy in July 2021 and hopes to take more walks now that weather is cooling down and working on training the dog. 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 usually day trips but now mostly virtual; she lives alone Monday thru Friday then stays with her boyfriend through the weekend    REPORTED WEIGHT HISTORY:  Struggled with weight even as a school aged child; around age 36 decided to make some changes and lost down to size 10/12 now back up to size 18/20        ANTHROPOMETRICS:    Ht Readings from Last 1 Encounters:   10/14/21 5' 4\" (1.626 m)      Wt Readings from Last 10 Encounters:   10/14/21 96.6 kg (213 lb)   07/26/21 98.9 kg (218 lb)   10/14/20 93.4 kg (205 lb 12.8 oz)   05/06/19 100.7 kg (222 lb)   04/01/19 101.1 kg (222 lb 12.8 oz)   10/23/18 94.3 kg (207 lb 12.8 oz)   01/30/18 92 kg (202 lb 14.4 oz)   12/13/17 92.9 kg (204 lb 11.2 oz)   12/12/17 92.5 kg (204 lb)   10/20/17 91.2 kg (201 lb 1.6 oz)     Per patient on home scale:  5/10/21 220.5 lbs  5/26/21 217.5 lbs  6/16/21 215.5 lbs  7/20/21 216 lbs  8/3/21  Unable to check today  9/13/21 212 lbs  9/27/21 211.5 lbs  10/14/21 207.5 lbs  10/26/21 207 lbs  11/11/21 209 lbs    IBW:120 # +/- 10%      BMI: 35.5 kg/M2 Category: obese    Weight Change Since Initial Consult: -11.5 lbs      NUTRITION ASSESSMENT:    FOOD ALLERGIES/INTOLERANCES: no known food allergies        Octavia AV Farr has been less consistent with tracking food (only 1 full day recorded) and not routinely checking BG since last check, states she feels more scattered. INITIAL NUTRITION DIAGNOSIS:  Food and nutrition related knowledge deficit related to new diagnosis of diabetes as evidenced by pt request for RD education and counseling.      NUTRITION DIAGNOSIS 2:  Limited adherence to nutrition recommendations related to lack of self efficacy for making change and stage of change in contemplation / preparation as evidenced by food choices and eating pattern not aligned with recommended nutrition prescription. for dinners and snacks and has not started exercise        ESTIMATED ENERGY NEEDS:  2115  (using [address] with AF 1.3) for weight maintenance; - 500 for weight loss at rate of 1 lb per week    NUTRITION INTERVENTION:  Nutrition 30 minute follow-up one-on-one education & goal setting with Octavia. Reviewed with Lashon Grace previous goals and plans established at last visit, any real or perceived barriers to obtaining the goal / adhering to plan, and progress towards goal made. Collaborated with patient to establish or revise specific nutrient goals as well as specific food / behavior changes that will help patient meet the overall goal of: glycemic control and weight loss    NUTRITION EDUCATION / HANDOUTS PROVIDED:        PATIENT GOALS:    Weight Goals:    Short Term Weight Goal: </= 215.5 lbs (met)  Long Term Weight Goal: <200 lbs  (per patient \"dream weigh\"t is 150 lbs)    Nutrition Goals:    Goals On Hold:  1600 calories / day (Not Met; On Hold)  30-45 grams of carbohydrate / meal (Not Met; On Hold)  - use provided meal plans at least 5 days per week (Not Met;  On Hold)     (Not Met or Partially Met;on short term hold):  - record BG and when checked (Partially Met; Continue)  New: Check bedtime BG - leave kit on bedside table as prompt / reminder to check  - keep detailed food diary for carbohydrate counts per meal/snack (Partially Met; Continue) New: Enter foods BEFORE eating  - use smaller plates / bowls; ask for half of restaurant meal to be pre-packaged in \"to go\" box to help facilitate portion control (Not Met; Use as needed for portion control)  - track water intake and aim for 64 oz / day (Not Met; Continue)    Goals New:  - check BG before and after breakfast and lunch  - eliminate english muffin from breakfast sandwich and double the egg white & turkey sausage  - practice eating sweets slowly & mindfully (ex: let one dark chocolate kiss melt slowly and savor it)  - brush teeth or use sugarless mint gum after eating      Specific tips and techniques to facilitate compliance with above recommendations were provided and discussed. Octavia Farr verbalized understanding. The patient was encouraged to contact me as needed. Follow-up scheduled in 3 weeks. Jose Clemens RD, [facility]  Octavia Farr is a 55 y.o. female being evaluated by a Virtual Visit (video visit) encounter to address concerns as mentioned above. A caregiver was present when appropriate. Due to this being a TeleHealth encounter (During [facility] public health emergency), evaluation of the following areas was limited: Nutrition Focused Physical Exam. Pursuant to the emergency declaration under the [address] [place] [address], [address] [place] [address] authority and the [employer] Act, this Virtual Visit was conducted with patient's (and/or legal guardian's) consent, to reduce the risk of exposure to COVID-19 and provide necessary medical care. Services were provided through a video synchronous discussion virtually to substitute for in-person encounter. --Jose Clemens RD on 11/11/2021 at 1:32 PM    An electronic signature was used to authenticate this note.

## 2021-11-19 ENCOUNTER — TELEPHONE (OUTPATIENT)
Dept: INTERNAL MEDICINE CLINIC | Age: 46
End: 2021-11-19

## 2021-11-19 ENCOUNTER — OFFICE VISIT (OUTPATIENT)
Dept: INTERNAL MEDICINE CLINIC | Age: 46
End: 2021-11-19
Payer: COMMERCIAL

## 2021-11-19 VITALS
HEIGHT: 64 IN | WEIGHT: 211.8 LBS | DIASTOLIC BLOOD PRESSURE: 82 MMHG | HEART RATE: 95 BPM | TEMPERATURE: 98 F | RESPIRATION RATE: 16 BRPM | SYSTOLIC BLOOD PRESSURE: 136 MMHG | OXYGEN SATURATION: 97 % | BODY MASS INDEX: 36.16 KG/M2

## 2021-11-19 DIAGNOSIS — D68.2 FACTOR V DEFICIENCY (HCC): ICD-10-CM

## 2021-11-19 DIAGNOSIS — N39.3 MILD STRESS INCONTINENCE: ICD-10-CM

## 2021-11-19 DIAGNOSIS — Z23 NEEDS FLU SHOT: ICD-10-CM

## 2021-11-19 DIAGNOSIS — R74.8 ELEVATED LIVER ENZYMES: Primary | ICD-10-CM

## 2021-11-19 DIAGNOSIS — E11.9 TYPE 2 DIABETES MELLITUS WITHOUT COMPLICATION, WITHOUT LONG-TERM CURRENT USE OF INSULIN (HCC): ICD-10-CM

## 2021-11-19 DIAGNOSIS — F33.9 RECURRENT DEPRESSION (HCC): ICD-10-CM

## 2021-11-19 DIAGNOSIS — Z00.00 ANNUAL PHYSICAL EXAM: Primary | ICD-10-CM

## 2021-11-19 DIAGNOSIS — E66.01 SEVERE OBESITY (HCC): ICD-10-CM

## 2021-11-19 PROCEDURE — 99396 PREV VISIT EST AGE 40-64: CPT | Performed by: PHYSICIAN ASSISTANT

## 2021-11-19 PROCEDURE — 90471 IMMUNIZATION ADMIN: CPT | Performed by: PHYSICIAN ASSISTANT

## 2021-11-19 PROCEDURE — 90686 IIV4 VACC NO PRSV 0.5 ML IM: CPT | Performed by: PHYSICIAN ASSISTANT

## 2021-11-19 PROCEDURE — 99214 OFFICE O/P EST MOD 30 MIN: CPT | Performed by: PHYSICIAN ASSISTANT

## 2021-11-19 RX ORDER — TERCONAZOLE 4 MG/G
CREAM VAGINAL
COMMUNITY
End: 2022-09-08

## 2021-11-19 NOTE — PATIENT INSTRUCTIONS
Vaccine Information Statement    Influenza (Flu) Vaccine (Inactivated or Recombinant): What You Need to Know    Many vaccine information statements are available in Lithuanian and other languages. See www.immunize.org/vis. Hojas de información sobre vacunas están disponibles en español y en muchos otros idiomas. Visite www.immunize.org/vis. 1. Why get vaccinated? Influenza vaccine can prevent influenza (flu). Flu is a contagious disease that spreads around the [facility] every year, usually between October and May. Anyone can get the flu, but it is more dangerous for some people. Infants and young children, people 72 years and older, pregnant people, and people with certain health conditions or a weakened immune system are at greatest risk of flu complications. Pneumonia, bronchitis, sinus infections, and ear infections are examples of flu-related complications. If you have a medical condition, such as heart disease, cancer, or diabetes, flu can make it worse. Flu can cause fever and chills, sore throat, muscle aches, fatigue, cough, headache, and runny or stuffy nose. Some people may have vomiting and diarrhea, though this is more common in children than adults. In an average year, thousands of people in the [facility] die from flu, and many more are hospitalized. Flu vaccine prevents millions of illnesses and flu-related visits to the doctor each year. 2. Influenza vaccines     CDC recommends everyone 6 months and older get vaccinated every flu season. Children 6 months through 6years of age may need 2 doses during a single flu season. Everyone else needs only 1 dose each flu season. It takes about 2 weeks for protection to develop after vaccination. There are many flu viruses, and they are always changing. Each year a new flu vaccine is made to protect against the influenza viruses believed to be likely to cause disease in the upcoming flu season.  Even when the vaccine doesnt exactly match these viruses, it may still provide some protection. Influenza vaccine does not cause flu. Influenza vaccine may be given at the same time as other vaccines. 3. Talk with your health care provider    Tell your vaccination provider if the person getting the vaccine:   Has had an allergic reaction after a previous dose of influenza vaccine, or has any severe, life-threatening allergies    Has ever had Guillain-Barré Syndrome (also called GBS)    In some cases, your health care provider may decide to postpone influenza vaccination until a future visit. Influenza vaccine can be administered at any time during pregnancy. People who are or will be pregnant during influenza season should receive inactivated influenza vaccine. People with minor illnesses, such as a cold, may be vaccinated. People who are moderately or severely ill should usually wait until they recover before getting influenza vaccine. Your health care provider can give you more information. 4. Risks of a vaccine reaction     Soreness, redness, and swelling where the shot is given, fever, muscle aches, and headache can happen after influenza vaccination.  There may be a very small increased risk of Guillain-Barré Syndrome (GBS) after inactivated influenza vaccine (the flu shot). Roxy Kelley children who get the flu shot along with pneumococcal vaccine (PCV13) and/or DTaP vaccine at the same time might be slightly more likely to have a seizure caused by fever. Tell your health care provider if a child who is getting flu vaccine has ever had a seizure. People sometimes faint after medical procedures, including vaccination. Tell your provider if you feel dizzy or have vision changes or ringing in the ears. As with any medicine, there is a very remote chance of a vaccine causing a severe allergic reaction, other serious injury, or death. 5. What if there is a serious problem?     An allergic reaction could occur after the vaccinated person leaves the clinic. If you see signs of a severe allergic reaction (hives, swelling of the face and throat, difficulty breathing, a fast heartbeat, dizziness, or weakness), call 9-1-1 and get the person to the nearest hospital.    For other signs that concern you, call your health care provider. Adverse reactions should be reported to the Vaccine Adverse Event Reporting System (VAERS). Your health care provider will usually file this report, or you can do it yourself. Visit the VAERS website at www.vaers. [facility].gov or call 2-634.444.4628. VAERS is only for reporting reactions, and VAERS staff members do not give medical advice. 6. The National Vaccine Injury Compensation Program    The [facility] Vaccine Injury Compensation Program (VICP) is a federal program that was created to compensate people who may have been injured by certain vaccines. Claims regarding alleged injury or death due to vaccination have a time limit for filing, which may be as short as two years. Visit the VICP website at www.[facility].gov/vaccinecompensation or call 7-914.275.3695 to learn about the program and about filing a claim. 7. How can I learn more?  Ask your health care provider.  Call your local or state health department.  Visit the website of the Food and Drug Administration (FDA) for vaccine package inserts and additional information at www.fda.gov/vaccines-blood-biologics/vaccines.  Contact the Centers for Disease Control and Prevention (CDC):  - Call 2-913.825.8215 (1-800-CDC-INFO) or  - Visit CDCs influenza website at www.cdc.gov/flu. Vaccine Information Statement   Inactivated Influenza Vaccine   8/6/2021  42 REGGIESalma Bustillo [id number]   Department of Health and Human Services  Centers for Disease Control and Prevention    Office Use Only

## 2021-11-19 NOTE — PROGRESS NOTES
Iraj Duran is a 55 y.o. female  Chief Complaint   Patient presents with    Complete Physical     Visit Vitals  /82   Pulse 95   Temp 98 °F (36.7 °C) (Temporal)   Resp 16   Ht 5' 4\" (1.626 m)   Wt 211 lb 12.8 oz (96.1 kg)   SpO2 97%   BMI 36.36 kg/m²      Health Maintenance Due   Topic Date Due    Foot Exam Q1  Never done    Eye Exam Retinal or Dilated  Never done    Cervical cancer screen  06/15/2020    Flu Vaccine (1) 2021    Lipid Screen  10/05/2021    MICROALBUMIN Q1  2021   Seeing GYN regularly. Social History     Tobacco Use    Smoking status: Former Smoker     Packs/day: 1.50     Years: 24.00     Pack years: 36.00     Types: Cigarettes     Quit date: 2015     Years since quittin.2    Smokeless tobacco: Never Used    Tobacco comment: Has tried hypnotherapy, ecig, Wellbutrin. Chantix worked! Substance Use Topics    Alcohol use: Yes     Alcohol/week: 10.0 standard drinks     Types: 10 Standard drinks or equivalent per week      Family History   Problem Relation Age of Onset    Diabetes Mother     Thyroid Disease Mother         hypothyroidism    Hypertension Father     High Cholesterol Father     Thyroid Disease Father         goiter, hyperthyroidism    Heart Attack Father         in 29's    Diabetes Maternal Grandmother     Breast Cancer Maternal Grandmother         not sure of age    [facility] Diabetes Maternal Grandfather     Diabetes Paternal Grandmother     Diabetes Paternal Grandfather     Colon Cancer Neg Hx       HPI  Pt presents for an Annual Physical.     Wt Readings from Last 3 Encounters:   21 211 lb 12.8 oz (96.1 kg)   10/14/21 213 lb (96.6 kg)   21 218 lb (98.9 kg)   Losing weight. Working on diet & exercise     DM II - seeing endocrine regularly. Review of Systems   Constitutional: Negative for fever and weight loss. HENT: Negative for congestion, hearing loss and sore throat. Eyes: Negative for blurred vision.    Respiratory: Negative for cough and shortness of breath. Cardiovascular: Negative for chest pain, palpitations and leg swelling. Gastrointestinal: Positive for diarrhea. Negative for abdominal pain, blood in stool, constipation, heartburn, melena, nausea and vomiting. Chronic mild diarrhea   Genitourinary: Negative for dysuria, frequency, hematuria and urgency. Occasional stress incont. Musculoskeletal: Negative for back pain, joint pain and neck pain. Skin: Negative for rash. Neurological: Negative for dizziness, seizures, loss of consciousness, weakness and headaches. Endo/Heme/Allergies: Negative for environmental allergies. Psychiatric/Behavioral: Negative for depression and substance abuse. The patient is not nervous/anxious and does not have insomnia. Physical Exam  Vitals and nursing note reviewed. Constitutional:       General: She is not in acute distress. Appearance: She is well-developed. HENT:      Head: Normocephalic and atraumatic. Right Ear: Tympanic membrane, ear canal and external ear normal.      Left Ear: Tympanic membrane, ear canal and external ear normal.   Eyes:      Conjunctiva/sclera: Conjunctivae normal.   Neck:      Thyroid: No thyromegaly. Vascular: No carotid bruit or JVD. Cardiovascular:      Rate and Rhythm: Normal rate and regular rhythm. Heart sounds: Normal heart sounds. Pulmonary:      Effort: Pulmonary effort is normal. No respiratory distress. Breath sounds: Normal breath sounds. Abdominal:      General: Bowel sounds are normal. There is no distension. Palpations: Abdomen is soft. There is no mass. Tenderness: There is no abdominal tenderness. There is no guarding or rebound. Musculoskeletal:      Cervical back: Neck supple. No muscular tenderness. Lymphadenopathy:      Cervical: No cervical adenopathy. Skin:     General: Skin is warm and dry.    Neurological:      Mental Status: She is alert and oriented to person, place, and time. Psychiatric:         Mood and Affect: Mood normal.         Behavior: Behavior normal.         Thought Content: Thought content normal.         Judgment: Judgment normal.       Diabetic foot exam:     Left: Reflexes 1+     Vibratory sensation normal    Proprioception normal   Sharp/dull discrimination normal    Filament test normal sensation with micro filament   Pulse DP: 2+ (normal)   Pulse PT: 2+ (normal)   Deformities: Mild - callus throughout B dorsal feet  Right: Reflexes 1+   Vibratory sensation normal   Proprioception normal   Sharp/dull discrimination normal   Filament test normal sensation with micro filament   Pulse DP: 2+ (normal)   Pulse PT: 2+ (normal)   Deformities: Mild - callus throughout B dorsal feet      Diagnoses and all orders for this visit:    1. Annual physical exam  -     TSH 3RD GENERATION; Future  -     METABOLIC PANEL, COMPREHENSIVE; Future  -     CBC W/O DIFF; Future    2. Factor V deficiency ([address]  Not on estrogen. 3. Recurrent depression ([address]  Controlled    4. Severe obesity ([address]  Discussed diet/exercise and options for/importance of losing weight. 5. Type 2 diabetes mellitus without complication, without long-term current use of insulin ([facility])  -     MICROALBUMIN, UR, RAND W/ MICROALB/CREAT RATIO; Future  -     LIPID PANEL; Future  -     HEMOGLOBIN A1C WITH EAG; Future  -      DIABETES FOOT EXAM    6. Needs flu shot  -     INFLUENZA VIRUS VAC QUAD,SPLIT,PRESV FREE SYRINGE IM    7. Mild stress incontinence  Encouraged daily Kegals. Declines Pelvic PT for now.

## 2021-11-19 NOTE — PROGRESS NOTES
Patient has been informed of any other discussion outside the parameters of the physical could result in other charges including a co pay, patient verbalized understanding. 1. Have you been to the ER, urgent care clinic since your last visit? Hospitalized since your last visit? No    2. Have you seen or consulted any other health care providers outside of the [address] [place] [address] since your last visit? Include any pap smears or colon screening.  Yes    Chief Complaint   Patient presents with    Complete Physical

## 2021-12-02 ENCOUNTER — HOSPITAL ENCOUNTER (OUTPATIENT)
Dept: CARDIAC REHAB | Age: 46
Discharge: HOME | End: 2021-12-02
Attending: PHYSICIAN ASSISTANT
Payer: COMMERCIAL

## 2021-12-02 PROCEDURE — 97803 MED NUTRITION INDIV SUBSEQ: CPT | Performed by: DIETITIAN, REGISTERED

## 2021-12-02 NOTE — TELEMEDICINE
Octavia Farr was informed of the inherent limitations of a virtual visit,  and has consented to a virtual therapy visit on 2021. Information regarding emergency contact information for this patient during this visit is to contact: Marilu Guadalupe  at [phone] in addition to calling 911. The patient was informed that at any time during the virtual visit, they can decide to stop the virtual visit. The patient verified that they are physically located in the [facility] for this virtual visit. [address] [facility] NUTRITION CLINIC NOTE  DATE: 2021      REFERRING PHYSICIAN: Macario Altamirano    NAME: Octavia Farr : 1975 AGE: 55 y.o.   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    Chronic UTI N39.0    Insomnia G47.00    Recurrent depression (HCC) F33.9    Severe obesity (HCC) E66.01    Essential hypertension I10    Type 2 diabetes mellitus without complication, without long-term current use of insulin (HCC) E11.9    Grief F43.21    Lipoma of anterior chest wall D17.1    Mild stress incontinence N39.3       LABS:   Lab Results   Component Value Date/Time    Cholesterol, total 183 2021 09:50 AM    HDL Cholesterol 82 2021 09:50 AM    LDL, calculated 63.4 2021 09:50 AM    VLDL, calculated 37.6 2021 09:50 AM    Triglyceride 188 (H) 2021 09:50 AM    CHOL/HDL Ratio 2.2 2021 09:50 AM     Lab Results   Component Value Date/Time    Hemoglobin A1c 6.8 (H) 2021 09:50 AM     Per patient A1c was 7.6 when checked by OB/GYN on 5/3/21    11/19/21 ,     MEDICATIONS/SUPPLEMENTS: [unfilled]  Prior to Admission medications    Medication Sig Start Date End Date Taking? Authorizing Provider   empagliflozin (Jardiance) 10 mg tablet Jardiance 10 mg tablet   Take 1 tablet every day by oral route. Provider, Historical   terconazole (TERAZOL 7) 0.4 % vaginal cream terconazole 0.4 % vaginal cream   1 applicatorful into vagina QHS x 7 nights then 1 applicatorful 1 x per week after that    Provider, Historical   cholecalciferol (Vitamin D3) 25 mcg (1,000 unit) cap Take  by mouth daily. Provider, Historical   metFORMIN (GLUCOPHAGE) 500 mg tablet TAKE 1 TABLET BY MOUTH TWICE DAILY WITH MEALS  Patient taking differently: Take 2,000 mg by mouth nightly. 4/2/21   Shaila Hernández PA-C   esomeprazole (NexIUM) 20 mg capsule Take 20 mg by mouth daily. Provider, Historical   aspirin delayed-release 81 mg tablet Take 81 mg by mouth daily. Provider, Historical   Trintellix 10 mg tablet Take 10 mg by mouth daily. 10/13/20   Provider, Historical   fexofenadine-pseudoephedrine (ALLEGRA-D 24 HOUR) 180-240 mg per tablet Take 1 Tab by mouth daily. Provider, Historical   REXULTI 1 mg tab tablet Take 1 mg by mouth daily. 3/14/19   Provider, Historical   multivitamin (ONE A DAY) tablet Take 1 Tab by mouth daily.     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 usually day trips but now mostly virtual; she lives alone Monday thru Friday then stays with her boyfriend through the weekend    REPORTED WEIGHT HISTORY:  Struggled with weight even as a school aged child; around age 36 decided to make some changes and lost down to size 10/12 now back up to size 18/20        ANTHROPOMETRICS:    Ht Readings from Last 1 Encounters: 11/19/21 5' 4\" (1.626 m)      Wt Readings from Last 10 Encounters:   11/19/21 96.1 kg (211 lb 12.8 oz)   10/14/21 96.6 kg (213 lb)   07/26/21 98.9 kg (218 lb)   10/14/20 93.4 kg (205 lb 12.8 oz)   05/06/19 100.7 kg (222 lb)   04/01/19 101.1 kg (222 lb 12.8 oz)   10/23/18 94.3 kg (207 lb 12.8 oz)   01/30/18 92 kg (202 lb 14.4 oz)   12/13/17 92.9 kg (204 lb 11.2 oz)   12/12/17 92.5 kg (204 lb)     Per patient on home scale:  5/10/21 220.5 lbs  5/26/21 217.5 lbs  6/16/21 215.5 lbs  7/20/21 216 lbs  8/3/21  Unable to check today  9/13/21 212 lbs  9/27/21 211.5 lbs  10/14/21 207.5 lbs  10/26/21 207 lbs  11/11/21 209 lbs  12/02/21 208.5 lbs    IBW:120 # +/- 10%      BMI: 35.8 kg/M2 Category: obese    Weight Change Since Initial Consult: -12 lbs      NUTRITION ASSESSMENT:    FOOD ALLERGIES/INTOLERANCES: no known food allergies        Octavia Farr was directed by her doctor to stop all alcohol and follow low fat eating plan due to elevated liver enzymes noted on 11/19/21. She resumed tracking her food and BG this week stating she was out of town and off from work the week prior. She reports not making any diet changes and her food diary contains multiple restaurant meals & desserts, for overall highfat and carbohydrate lunch and / or dinners. INITIAL NUTRITION DIAGNOSIS:  Food and nutrition related knowledge deficit related to new diagnosis of diabetes as evidenced by pt request for RD education and counseling. NUTRITION DIAGNOSIS 2:  Limited adherence to nutrition recommendations related to lack of self efficacy for making change and stage of change in contemplation / preparation as evidenced by food choices and eating pattern not aligned with recommended nutrition prescription.  for dinners and snacks and has not started exercise        ESTIMATED ENERGY NEEDS:  2115  (using [address] with AF 1.3) for weight maintenance; - 500 for weight loss at rate of 1 lb per week    NUTRITION INTERVENTION:  Nutrition 45 minute follow-up one-on-one education & goal setting with Octavia. Reviewed with Brian Weeks previous goals and plans established at last visit, any real or perceived barriers to obtaining the goal / adhering to plan, and progress towards goal made. Collaborated with patient to establish or revise specific nutrient goals as well as specific food / behavior changes that will help patient meet the overall goal of: glycemic control and liver enzymes WDL    NUTRITION EDUCATION / HANDOUTS PROVIDED:  - Best Bites when dining out  - \"Eat This, Not That\" examples from restaurants  - Fat Restricted diet education from Nutrition Care Manual      PATIENT GOALS:    Weight Goals:    Short Term Weight Goal has been met. Long Term Weight Goal: <200 lbs  (per patient \"dream weigh\"t is 150 lbs)    Nutrition Goals:    - No more than 45 grams carbohydrate per meal  - Under 65 grams total fat per day (and mostly from unsaturated fats such as nuts, peanut butter, avocado, olive oil)  - record BG and when checked (Partially Met; Continue)    - keep detailed food diary for carbohydrate and fat counts per meal/snack  - avoid restaurant meals at lunch time  - reduce cheese; do not add to sandwiches or salads / request no cheese from restaurants  - use non-stick spray vs oils in cooking; use \"light\" salad dressings  - look up restaurant facts prior to ordering and choose those lower in fat and carbohydrates   - fill up on non-starchy vegetables and berries to help reduce portions of higher fat, higher carbohydrate foods      Specific tips and techniques to facilitate compliance with above recommendations were provided and discussed. Octavia Farr verbalized understanding. The patient was encouraged to contact me as needed. Follow-up scheduled in 2.5 weeks.               Sherrie Lundberg RD, [facility]  Octavia Farr is a 55 y.o. female being evaluated by a Virtual Visit (video visit) encounter to address concerns as mentioned above. A caregiver was present when appropriate. Due to this being a TeleHealth encounter (During BNQRS-18 public health emergency), evaluation of the following areas was limited: Nutrition Focused Physical Exam. Pursuant to the emergency declaration under the [address] [place] [address], [address] [place] [address] authority and the [employer] Act, this Virtual Visit was conducted with patient's (and/or legal guardian's) consent, to reduce the risk of exposure to COVID-19 and provide necessary medical care. Services were provided through a video synchronous discussion virtually to substitute for in-person encounter. --Sneha Kirkland RD on 12/2/2021 at 1:32 PM    An electronic signature was used to authenticate this note.

## 2021-12-09 LAB
ALBUMIN SERPL-MCNC: 4.3 G/DL (ref 3.8–4.8)
ALP SERPL-CCNC: 112 IU/L (ref 44–121)
ALT SERPL-CCNC: 183 IU/L (ref 0–32)
AST SERPL-CCNC: 86 IU/L (ref 0–40)
BILIRUB DIRECT SERPL-MCNC: <0.1 MG/DL (ref 0–0.4)
BILIRUB SERPL-MCNC: 0.2 MG/DL (ref 0–1.2)
PROT SERPL-MCNC: 7 G/DL (ref 6–8.5)

## 2021-12-10 DIAGNOSIS — R74.8 ELEVATED LIVER ENZYMES: Primary | ICD-10-CM

## 2021-12-10 NOTE — TELEPHONE ENCOUNTER
Liver enzymes are improving, but still very high. I'd like you to see the Hepatologist to prevent future liver disease. It's ok if it takes a few months to get in.      [address], [address] [facility]  [phone]

## 2021-12-20 ENCOUNTER — HOSPITAL ENCOUNTER (OUTPATIENT)
Dept: CARDIAC REHAB | Age: 46
Discharge: HOME | End: 2021-12-20
Attending: PHYSICIAN ASSISTANT
Payer: COMMERCIAL

## 2021-12-20 PROCEDURE — 97803 MED NUTRITION INDIV SUBSEQ: CPT | Performed by: DIETITIAN, REGISTERED

## 2021-12-20 NOTE — TELEMEDICINE
Octavia Farr was informed of the inherent limitations of a virtual visit,  and has consented to a virtual therapy visit on 2021. Information regarding emergency contact information for this patient during this visit is to contact: Gabriele Lance  at [phone] in addition to calling 911. The patient was informed that at any time during the virtual visit, they can decide to stop the virtual visit. The patient verified that they are physically located in the [facility] for this virtual visit. [address] [facility] NUTRITION CLINIC NOTE  DATE: 2021      REFERRING PHYSICIAN: Umberto Oakes    NAME: Octavia Farr : 1975 AGE: 55 y.o.   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    Chronic UTI N39.0    Insomnia G47.00    Recurrent depression (HCC) F33.9    Severe obesity (HCC) E66.01    Essential hypertension I10    Type 2 diabetes mellitus without complication, without long-term current use of insulin (HCC) E11.9    Grief F43.21    Lipoma of anterior chest wall D17.1    Mild stress incontinence N39.3       LABS:   Lab Results   Component Value Date/Time    Cholesterol, total 183 2021 09:50 AM    HDL Cholesterol 82 2021 09:50 AM    LDL, calculated 63.4 2021 09:50 AM    VLDL, calculated 37.6 2021 09:50 AM    Triglyceride 188 (H) 2021 09:50 AM    CHOL/HDL Ratio 2.2 2021 09:50 AM     Lab Results   Component Value Date/Time    Hemoglobin A1c 6.8 (H) 2021 09:50 AM     Per patient A1c was 7.6 when checked by OB/GYN on 5/3/21    11/19/21 ,     MEDICATIONS/SUPPLEMENTS: [unfilled]  Prior to Admission medications    Medication Sig Start Date End Date Taking? Authorizing Provider   empagliflozin (Jardiance) 10 mg tablet Jardiance 10 mg tablet   Take 1 tablet every day by oral route. Provider, Historical   terconazole (TERAZOL 7) 0.4 % vaginal cream terconazole 0.4 % vaginal cream   1 applicatorful into vagina QHS x 7 nights then 1 applicatorful 1 x per week after that    Provider, Historical   cholecalciferol (Vitamin D3) 25 mcg (1,000 unit) cap Take  by mouth daily. Provider, Historical   metFORMIN (GLUCOPHAGE) 500 mg tablet TAKE 1 TABLET BY MOUTH TWICE DAILY WITH MEALS  Patient taking differently: Take 2,000 mg by mouth nightly. 4/2/21   Guevara Balbuena PA-C   esomeprazole (NexIUM) 20 mg capsule Take 20 mg by mouth daily. Provider, Historical   aspirin delayed-release 81 mg tablet Take 81 mg by mouth daily. Provider, Historical   Trintellix 10 mg tablet Take 10 mg by mouth daily. 10/13/20   Provider, Historical   fexofenadine-pseudoephedrine (ALLEGRA-D 24 HOUR) 180-240 mg per tablet Take 1 Tab by mouth daily. Provider, Historical   REXULTI 1 mg tab tablet Take 1 mg by mouth daily. 3/14/19   Provider, Historical   multivitamin (ONE A DAY) tablet Take 1 Tab by mouth daily.     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[facility]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 usually day trips but now mostly virtual; she lives alone Monday thru Friday then stays with her boyfriend through the weekend    REPORTED WEIGHT HISTORY:  Struggled with weight even as a school aged child; around age 36 decided to make some changes and lost down to size 10/12 now back up to size 18/20        ANTHROPOMETRICS:    Ht Readings from Last 1 Encounters: 11/19/21 5' 4\" (1.626 m)      Wt Readings from Last 10 Encounters:   11/19/21 96.1 kg (211 lb 12.8 oz)   10/14/21 96.6 kg (213 lb)   07/26/21 98.9 kg (218 lb)   10/14/20 93.4 kg (205 lb 12.8 oz)   05/06/19 100.7 kg (222 lb)   04/01/19 101.1 kg (222 lb 12.8 oz)   10/23/18 94.3 kg (207 lb 12.8 oz)   01/30/18 92 kg (202 lb 14.4 oz)   12/13/17 92.9 kg (204 lb 11.2 oz)   12/12/17 92.5 kg (204 lb)     Per patient on home scale:  5/10/21 220.5 lbs  5/26/21 217.5 lbs  6/16/21 215.5 lbs  7/20/21 216 lbs  8/3/21  Unable to check today  9/13/21 212 lbs  9/27/21 211.5 lbs  10/14/21 207.5 lbs  10/26/21 207 lbs  11/11/21 209 lbs  12/02/21 208.5 lbs  12/20/21 No scale access    IBW:120 # +/- 10%      BMI: 35.8 kg/M2 Category: obese    Weight Change Since Initial Consult: -12 lbs      NUTRITION ASSESSMENT:    FOOD ALLERGIES/INTOLERANCES: no known food allergies        Octavia Farr states she is strongly considering doing [employer] plan again - she did it for several months once before but was on steroids at that time and always felt hungry. Feels the structure would help her as she hasn't made any changes to her diet and relapsed on tracking food and checking blood sugars this month. INITIAL NUTRITION DIAGNOSIS:  Food and nutrition related knowledge deficit related to new diagnosis of diabetes as evidenced by pt request for RD education and counseling. NUTRITION DIAGNOSIS 2:  Limited adherence to nutrition recommendations related to lack of self efficacy for making change and stage of change in contemplation / preparation as evidenced by food choices and eating pattern not aligned with recommended nutrition prescription.  for dinners and snacks and has not started exercise        ESTIMATED ENERGY NEEDS:  2115  (using [address] with AF 1.3) for weight maintenance; - 500 for weight loss at rate of 1 lb per week    NUTRITION INTERVENTION:  Nutrition 25 minute follow-up one-on-one education & goal setting with Octavia. Reviewed with Orquidea Cantrell previous goals and plans established at last visit, any real or perceived barriers to obtaining the goal / adhering to plan, and progress towards goal made. Collaborated with patient to establish or revise specific nutrient goals as well as specific food / behavior changes that will help patient meet the overall goal of: glycemic control and liver enzymes WDL    NUTRITION EDUCATION / HANDOUTS PROVIDED:  - reviewed Optavia pros/cons with patient      PATIENT GOALS:    Weight Goals:    Short Term Weight Goal has been met. Long Term Weight Goal: <200 lbs  (per patient \"dream weigh\"t is 150 lbs)    Nutrition Goals:    -Blanca Fernandez as a bridge to making more sustainable lifestyle changes      Specific tips and techniques to facilitate compliance with above recommendations were provided and discussed. Octavia Farr verbalized understanding. The patient was encouraged to contact me as needed. Follow-up not scheduled for now; encouraged patient to follow-up as needed in preparation of transitioning off of Maria Vanessa, MARIE, Rafi Cutler is a 55 y.o. female being evaluated by a Virtual Visit (video visit) encounter to address concerns as mentioned above. A caregiver was present when appropriate. Due to this being a TeleHealth encounter (During DUS-33 public health emergency), evaluation of the following areas was limited: Nutrition Focused Physical Exam. Pursuant to the emergency declaration under the [address] [place] [address] authority and the [employer] Act, this Virtual Visit was conducted with patient's (and/or legal guardian's) consent, to reduce the risk of exposure to COVID-19 and provide necessary medical care. Services were provided through a video synchronous discussion virtually to substitute for in-person encounter.     --Orquidea Cantrell Elodia Reed RD on 12/20/2021 at 1:32 PM    An electronic signature was used to authenticate this note.

## 2022-03-03 LAB — HBA1C MFR BLD HPLC: 5.5 %

## 2022-03-08 ENCOUNTER — TELEPHONE (OUTPATIENT)
Dept: HEMATOLOGY | Age: 47
End: 2022-03-08

## 2022-03-08 NOTE — TELEPHONE ENCOUNTER
[email or web address] Patient is asking if she need labs before appt on 3/14/22 with Carolee Dahl NP. I will ask Carolee Dahl. (MAURICIO)---[phone] This patient is a new patient and will be evaluated at that time whether labs will be needed. Carolee Dahl will review referring MD notes/labs and will made a decision then. Patient verbalize understanding. (MAURICIO)

## 2022-03-14 ENCOUNTER — OFFICE VISIT (OUTPATIENT)
Dept: HEMATOLOGY | Age: 47
End: 2022-03-14
Payer: COMMERCIAL

## 2022-03-14 VITALS
WEIGHT: 194 LBS | HEART RATE: 82 BPM | HEIGHT: 64 IN | SYSTOLIC BLOOD PRESSURE: 109 MMHG | BODY MASS INDEX: 33.12 KG/M2 | DIASTOLIC BLOOD PRESSURE: 72 MMHG | TEMPERATURE: 97.6 F

## 2022-03-14 DIAGNOSIS — R74.8 ELEVATED LIVER ENZYMES: Primary | ICD-10-CM

## 2022-03-14 PROCEDURE — 99204 OFFICE O/P NEW MOD 45 MIN: CPT | Performed by: PHYSICIAN ASSISTANT

## 2022-03-14 NOTE — PROGRESS NOTES
[facility], [place], [address], [place], [place]      FRANCOIS Grimaldo, [facility] Acosta S Fan, [facility]   JAMILA Fraser, [facility]       [address]    at [place] [address] [facility], [address] [place] [address], [facility] 22.    [phone]    FAX: [address] [place] [address] Avenue    at [address], [address]    [phone]    FAX: [phone]       Patient Care Team:  Kiersten Martinez as PCP - General (Physician Assistant)  Bharat Powers PA-C as PCP - [facility] Empaneled Provider  Juanito Vasquez MD as Physician (Family Medicine)  Call, Alvin Salinas MD (Allergy)  Lesia Deleon MD (Gastroenterology)  Maddy Mays MD (Endocrinology)      Problem List  Date Reviewed: 10/14/2021          Codes Class Noted    Mild stress incontinence ICD-10-CM: N39.3  ICD-9-CM: Sarina Stalker  11/19/2021        Lipoma of anterior chest wall ICD-10-CM: D17.1  ICD-9-CM: 214.8  7/26/2021        Grief ICD-10-CM: F43.21  ICD-9-CM: 309.0  5/18/2021    Overview Signed 5/18/2021  1:47 PM by Bharat Powers PA-C     5/2021: Father passed away 2/2021. Has good support system. Planning [facility] service in June, 2021.              Type 2 diabetes mellitus without complication, without long-term current use of insulin (HCC) ICD-10-CM: E11.9  ICD-9-CM: 250.00  12/15/2020        Essential hypertension ICD-10-CM: I10  ICD-9-CM: 401.9  5/6/2019        Recurrent depression ([facility] ICD-10-CM: F33.9  ICD-9-CM: 296.30  10/23/2018        Severe obesity ([facility] ICD-10-CM: E66.01  ICD-9-CM: 278.01  10/23/2018        Insomnia ICD-10-CM: G47.00  ICD-9-CM: 780.52  5/27/2016        Chronic UTI ICD-10-CM: N39.0  ICD-9-CM: 599.0  1/18/2016        HPV in female ICD-10-CM: B97.7  ICD-9-CM: 079.4 6/15/2015        Multinodular goiter ICD-10-CM: E04.2  ICD-9-CM: 241.1  5/21/2015        Elevated liver enzymes ICD-10-CM: R74.8  ICD-9-CM: 790.5  5/21/2015        Hypercholesteremia ICD-10-CM: E78.00  ICD-9-CM: 272.0  5/21/2015        Allergic rhinitis due to allergen ICD-10-CM: J30.9  ICD-9-CM: 477.9  5/15/2015        Anxiety ICD-10-CM: F41.9  ICD-9-CM: 300.00  5/15/2015        Factor V deficiency ([address] ICD-10-CM: T12.0  ICD-9-CM: 286.3  5/15/2015        Urticaria ICD-10-CM: L50.9  ICD-9-CM: 708.9  5/15/2015              The clinicians listed above have asked me to see Farshad Blair in consultation regarding elevated liver enzymes and its management. All medical records sent by the referring physicians were reviewed. The patient is a 55 y.o.  female who was found to have elevated liver enzymes in 2018 during routine labs. These have varied within 20-40 points for several years, but in 11/2021, became markedly elevated. She denies changes in weight at that time, but has had weight issues long-standing. She was changed in her DM medications in the Fall and this may have contributed and she thinks that she was on a course of Diflucan at that time. Past liver enzyme elevation had led to imaging with ultrasound in 2018 suggestive of steatosis. She has had negative testing for chronic hep B and C in the past as well as a negative DARLENE and normal TS sat%. At the urging of her PCP, she has since largely discontinued use of alcohol and has started on weight loss program using Optavia meal replacements. She has had ~17# weight loss in the past 8 weeks with this program and is feeling better in general.  Her recent Hgb A1C was 5.1%. Ultrasound of the liver was performed in 2018. The results of the imaging suggested fatty liver disease. An assessment of liver fibrosis with biopsy, Fibroscan or elastography has not been performed.       The patient does not have any symptoms which could be attributed to the liver disorder. The patient completes all daily activities without any functional limitations. ASSESSMENT AND PLAN:  Elevated liver enzymes  Persistent elevation in liver transaminases of unclear etiology at this time. Given her other features of metabolic syndrome, this is likely related to NAFLD. We have discussed importance of ruling out other common etiologies of liver disease and will proceed with assessing for degree of damage with [facility] Fibrosure on labs and a Fibroscan at her next return appointment. Liver transaminases are elevated. ALP is normal.  Liver function is normal.  The platelet count is normal.      Serologic testing for causes of chronic liver disease was negative for HCV, HBV, DARLENE. Will perform additional serologic tests to screen for other causes of chronic liver disease. The most likely causes for the liver chemistry abnormalities were discussed with the patient and include   fatty liver disease, immune liver disorders, genetic metabolic liver disorders. As above we have discussed that her initiation of weight loss is a dick component to overall management as is tight control of BP and DM. The need to perform an assessment of liver fibrosis was discussed with the patient. The Fibroscan can assess liver fibrosis and determine if a patient has advanced fibrosis or cirrhosis without the need for liver biopsy. This will be performed at the next office visit. If the Fibroscan suggests advanced fibrosis then a liver biopsy should be considered. The Fibroscan can be repeated annually or as often as clinically indicated to assess for fibrosis progression and/or regression. Will review current labs in the setting of recent weight losses and if these remain high, we will perform imaging of the liver with ultrasound.       Screening for Hepatocellular Carcinoma  HCC screening is not necessary if the patient has no evidence of cirrhosis. Treatment of other medical problems in patients with chronic liver disease  There are no contraindications for the patient to take most medications that are necessary for treatment of other medical issues. The patient can take any medications utilized for treatment of DM, statins to treat hypercholesterolemia. Counseling for alcohol in patients with chronic liver disease  The patient was counseled regarding alcohol consumption and the effect of alcohol on chronic liver disease. The patient does not consume any significant amount of alcohol. Vaccinations   The need for vaccination against viral hepatitis A and B will be assessed with serologic and instituted as appropriate. Routine vaccinations against other bacterial and viral agents can be performed as indicated. Annual flu vaccination should be administered if indicated. ALLERGIES  No Known Allergies    MEDICATIONS  Current Outpatient Medications   Medication Sig    empagliflozin (Jardiance) 10 mg tablet Jardiance 10 mg tablet   Take 1 tablet every day by oral route.  terconazole (TERAZOL 7) 0.4 % vaginal cream terconazole 0.4 % vaginal cream   1 applicatorful into vagina QHS x 7 nights then 1 applicatorful 1 x per week after that    cholecalciferol (Vitamin D3) 25 mcg (1,000 unit) cap Take  by mouth daily.  metFORMIN (GLUCOPHAGE) 500 mg tablet TAKE 1 TABLET BY MOUTH TWICE DAILY WITH MEALS (Patient taking differently: Take 2,000 mg by mouth nightly.)    esomeprazole (NexIUM) 20 mg capsule Take 20 mg by mouth daily.  aspirin delayed-release 81 mg tablet Take 81 mg by mouth daily.  Trintellix 10 mg tablet Take 10 mg by mouth daily.  fexofenadine-pseudoephedrine (ALLEGRA-D 24 HOUR) 180-240 mg per tablet Take 1 Tab by mouth daily.  REXULTI 1 mg tab tablet Take 1 mg by mouth daily.  multivitamin (ONE A DAY) tablet Take 1 Tab by mouth daily. No current facility-administered medications for this visit.        SYSTEM REVIEW NOT RELATED TO LIVER DISEASE OR REVIEWED ABOVE:  Constitution systems: Negative for fever, chills, weight gain. Recent weight loss of ~17# since 2021. Eyes: Negative for visual changes. ENT: Negative for sore throat, painful swallowing. Respiratory: Negative for cough, hemoptysis, SOB. Cardiology: Negative for chest pain, palpitations. GI:  Negative for constipation or diarrhea. : Negative for urinary frequency, dysuria, hematuria, nocturia. Skin: Negative for rash. Hematology: Negative for easy bruising, blood clots. Musculo-skeletal: Negative for back pain, muscle pain, weakness. Neurologic: Negative for headaches, dizziness, vertigo, memory problems not related to HE. Psychology: Negative for anxiety, depression. FAMILY HISTORY:  The father  of MI. The mother Has/had the following chronic disease(s): none, 76 and well. The following family members have liver disease: PGM that may have had NAFL. SOCIAL HISTORY:  The patient is single in long-standing relationship. The patient has no children. The patient stopped using tobacco products in . The patient consumes alcohol on social occasions never in excess. 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 PHYSICAL EXAMINATION:  Visit Vitals  /72 (BP 1 Location: Left upper arm, BP Patient Position: Sitting, BP Cuff Size: Adult)   Pulse 82   Temp 97.6 °F (36.4 °C) (Temporal)   Ht 5' 4\" (1.626 m)   Wt 194 lb (88 kg)   BMI 33.30 kg/m²     General: No acute distress. Eyes: Sclera anicteric. ENT: No oral lesions. Thyroid normal.  Nodes: No adenopathy. Skin: No spider angiomata. No jaundice. No palmar erythema. Respiratory: Lungs clear to auscultation. Cardiovascular: Regular heart rate. No murmurs. No JVD. Abdomen: Soft non-tender. Liver size normal to percussion/palpation. Spleen not palpable. No obvious ascites. Extremities: No edema.   No muscle wasting. No gross arthritic changes. Neurologic: Alert and oriented. Cranial nerves grossly intact. No asterixis. LABORATORY STUDIES:  [address] & Units 12/8/2021 11/19/2021   WBC 3.6 - 11.0 K/uL  7.9   ANC 1.4 - 7.0 x10E3/uL     HGB 11.5 - 16.0 g/dL  13.8    - 400 K/uL  352   AST 0 - 40 IU/L 86 (H) 131 (H)   ALT 0 - 32 IU/L 183 (H) 248 (H)   Alk Phos 44 - 121 IU/L 112 106   Bili, Total 0.0 - 1.2 mg/dL 0.2 0.4   Bili, Direct 0.00 - 0.40 mg/dL <0.10    Albumin 3.8 - 4.8 g/dL 4.3 3.8   BUN 6 - 20 MG/DL  17   Creat 0.55 - 1.02 MG/DL  0.80   Na 136 - 145 mmol/L  138   K 3.5 - 5.1 mmol/L  4.2   Cl 97 - 108 mmol/L  107   CO2 21 - 32 mmol/L  22   Glucose 65 - 100 mg/dL  146 (H)     Liver [facility] Latest Ref Rng & Units 1/12/2021   WBC 3.6 - 11.0 K/uL    ANC 1.4 - 7.0 x10E3/uL    HGB 11.5 - 16.0 g/dL     - 400 K/uL    AST 0 - 40 IU/L 31   ALT 0 - 32 IU/L 51 (H)   Alk Phos 44 - 121 IU/L 97   Bili, Total 0.0 - 1.2 mg/dL 0.2   Bili, Direct 0.00 - 0.40 mg/dL    Albumin 3.8 - 4.8 g/dL 4.5   BUN 6 - 20 MG/DL 13   Creat 0.55 - 1.02 MG/DL 0.74   Na 136 - 145 mmol/L 138   K 3.5 - 5.1 mmol/L 4.4   Cl 97 - 108 mmol/L 103   CO2 21 - 32 mmol/L 23   Glucose 65 - 100 mg/dL 157 (H)   Additional lab values drawn at today's office visit are pending at the time of documentation. SEROLOGIES:  Serologies Latest Ref Rng & Units 10/5/2020 5/6/2019 4/1/2019   Hep B Surface Ag Negative  Negative    Hep C Ab 0.0 - 0.9 s/co ratio  <0.1    Iron % Saturation 15 - 55 % 27     DARLENE Ab, Direct Negative   Negative     LIVER HISTOLOGY:  Not available or performed    ENDOSCOPIC PROCEDURES:  Not available or performed    RADIOLOGY:  1/2018. Ultrasound of liver. Echogenic consistent with chronic liver disease suggestive of liver steatosis. No liver mass lesions. No dilated bile ducts. No ascites.     OTHER TESTING:  Not available or performed    FOLLOW-UP:  All of the issues listed above in the Assessment and Plan were discussed with the patient. All questions were answered. The patient expressed a clear understanding of the above. [facility] 87 in 3 months for Fibroscan and to assess for the effects of diet changes and weight loss. I will notify her of additional lab values as available to me. Charles Thomas PA-C  Liver [facility] 59, [address] [facility] 22.  [phone]  [address] [facility]    6/2022. FibroScan performed at [facility]. Liver Research facility, [place]. EkPa was 26.1. Suggested fibrosis level is F4. CAP score is 222.

## 2022-03-14 NOTE — PROGRESS NOTES
Identified pt with two pt identifiers(name and ). Reviewed record in preparation for visit and have obtained necessary documentation. Chief Complaint   Patient presents with    Elevated Liver Enzymes     Establish care with Dawson Galavizher:    22 1453   BP: 109/72   Pulse: 82   Temp: 97.6 °F (36.4 °C)   TempSrc: Temporal   Weight: 194 lb (88 kg)   Height: 5' 4\" (1.626 m)   PainSc:   0 - No pain       Health Maintenance Review: Patient reminded of \"due or due soon\" health maintenance. I have asked the patient to contact his/her primary care provider (PCP) for follow-up on his/her health maintenance. Coordination of Care Questionnaire:  :   1) Have you been to an emergency room, urgent care, or hospitalized since your last visit? If yes, where when, and reason for visit? no       2. Have seen or consulted any other health care provider since your last visit? If yes, where when, and reason for visit? NO      Patient is accompanied by self I have received verbal consent from Jose Luis Acosta to discuss any/all medical information while they are present in the room.

## 2022-03-15 LAB
A1AT SERPL-MCNC: 140 MG/DL (ref 101–187)
ALBUMIN SERPL-MCNC: 4.8 G/DL (ref 3.8–4.8)
ALP SERPL-CCNC: 80 IU/L (ref 44–121)
ALT SERPL-CCNC: 45 IU/L (ref 0–32)
AST SERPL-CCNC: 26 IU/L (ref 0–40)
BILIRUB DIRECT SERPL-MCNC: 0.11 MG/DL (ref 0–0.4)
BILIRUB SERPL-MCNC: 0.3 MG/DL (ref 0–1.2)
BUN SERPL-MCNC: 17 MG/DL (ref 6–24)
BUN/CREAT SERPL: 25 (ref 9–23)
CALCIUM SERPL-MCNC: 9.9 MG/DL (ref 8.7–10.2)
CERULOPLASMIN SERPL-MCNC: 28.4 MG/DL (ref 19–39)
CHLORIDE SERPL-SCNC: 100 MMOL/L (ref 96–106)
CO2 SERPL-SCNC: 19 MMOL/L (ref 20–29)
CREAT SERPL-MCNC: 0.69 MG/DL (ref 0.57–1)
EGFR: 108 ML/MIN/1.73
ERYTHROCYTE [DISTWIDTH] IN BLOOD BY AUTOMATED COUNT: 13.9 % (ref 11.7–15.4)
FERRITIN SERPL-MCNC: 31 NG/ML (ref 15–150)
GLUCOSE SERPL-MCNC: 116 MG/DL (ref 65–99)
HAV AB SER QL IA: POSITIVE
HBV CORE AB SERPL QL IA: NEGATIVE
HBV SURFACE AB SER QL: NON REACTIVE
HCT VFR BLD AUTO: 42.3 % (ref 34–46.6)
HGB BLD-MCNC: 13.7 G/DL (ref 11.1–15.9)
INR PPP: 1 (ref 0.9–1.2)
IRON SATN MFR SERPL: 19 % (ref 15–55)
IRON SERPL-MCNC: 82 UG/DL (ref 27–159)
MCH RBC QN AUTO: 26.7 PG (ref 26.6–33)
MCHC RBC AUTO-ENTMCNC: 32.4 G/DL (ref 31.5–35.7)
MCV RBC AUTO: 83 FL (ref 79–97)
PLATELET # BLD AUTO: 325 X10E3/UL (ref 150–450)
POTASSIUM SERPL-SCNC: 4.2 MMOL/L (ref 3.5–5.2)
PROT SERPL-MCNC: 7.3 G/DL (ref 6–8.5)
PROTHROMBIN TIME: 10.3 SEC (ref 9.1–12)
RBC # BLD AUTO: 5.13 X10E6/UL (ref 3.77–5.28)
SODIUM SERPL-SCNC: 136 MMOL/L (ref 134–144)
TIBC SERPL-MCNC: 424 UG/DL (ref 250–450)
UIBC SERPL-MCNC: 342 UG/DL (ref 131–425)
WBC # BLD AUTO: 8.7 X10E3/UL (ref 3.4–10.8)

## 2022-03-17 LAB — SMA IGG SER-ACNC: 5 UNITS (ref 0–19)

## 2022-03-25 NOTE — PROGRESS NOTES
Pt notified of negative evaluation for common etiologies of liver disease. LUZ MARINA likely related to metabolic disease/fatty liver. She has had near normalization of enzymes with 22# weight loss. Continue with efforts and return in 6/2022 for Fibroscan as planned.

## 2022-04-05 ENCOUNTER — TELEPHONE (OUTPATIENT)
Dept: HEMATOLOGY | Age: 47
End: 2022-04-05

## 2022-04-05 NOTE — TELEPHONE ENCOUNTER
Patient called for advice on participating in a research program for her [facility] doctor. Patient also says that if she does participate in the study, they would do the same Ultrasound that she has scheduled for you in June. Please give her a call back to answer her questions.  Thanks! -Rubia

## 2022-05-04 ENCOUNTER — OFFICE VISIT (OUTPATIENT)
Dept: INTERNAL MEDICINE CLINIC | Age: 47
End: 2022-05-04
Payer: COMMERCIAL

## 2022-05-04 VITALS
SYSTOLIC BLOOD PRESSURE: 121 MMHG | BODY MASS INDEX: 30.59 KG/M2 | HEART RATE: 86 BPM | TEMPERATURE: 97.7 F | HEIGHT: 64 IN | WEIGHT: 179.2 LBS | RESPIRATION RATE: 14 BRPM | DIASTOLIC BLOOD PRESSURE: 83 MMHG | OXYGEN SATURATION: 96 %

## 2022-05-04 DIAGNOSIS — B35.4 TINEA CORPORIS: ICD-10-CM

## 2022-05-04 DIAGNOSIS — F33.9 RECURRENT DEPRESSION (HCC): ICD-10-CM

## 2022-05-04 DIAGNOSIS — R39.9 UTI SYMPTOMS: ICD-10-CM

## 2022-05-04 DIAGNOSIS — E11.9 TYPE 2 DIABETES MELLITUS WITHOUT COMPLICATION, WITHOUT LONG-TERM CURRENT USE OF INSULIN (HCC): Primary | ICD-10-CM

## 2022-05-04 DIAGNOSIS — D68.2 FACTOR V DEFICIENCY (HCC): ICD-10-CM

## 2022-05-04 DIAGNOSIS — E66.01 SEVERE OBESITY (HCC): ICD-10-CM

## 2022-05-04 LAB
BILIRUB UR QL STRIP: NEGATIVE
GLUCOSE UR-MCNC: NORMAL MG/DL
KETONES P FAST UR STRIP-MCNC: NEGATIVE MG/DL
PH UR STRIP: 6 [PH] (ref 4.6–8)
PROT UR QL STRIP: NEGATIVE
SP GR UR STRIP: 1.03 (ref 1–1.03)
UA UROBILINOGEN AMB POC: NORMAL (ref 0.2–1)
URINALYSIS CLARITY POC: NORMAL
URINALYSIS COLOR POC: NORMAL
URINE BLOOD POC: NEGATIVE
URINE LEUKOCYTES POC: NEGATIVE
URINE NITRITES POC: NEGATIVE

## 2022-05-04 PROCEDURE — 81003 URINALYSIS AUTO W/O SCOPE: CPT | Performed by: PHYSICIAN ASSISTANT

## 2022-05-04 PROCEDURE — 99214 OFFICE O/P EST MOD 30 MIN: CPT | Performed by: PHYSICIAN ASSISTANT

## 2022-05-04 RX ORDER — FLUCONAZOLE 150 MG/1
150 TABLET ORAL
Qty: 2 TABLET | Refills: 0 | Status: SHIPPED | OUTPATIENT
Start: 2022-05-04 | End: 2022-05-12

## 2022-05-04 RX ORDER — BREXPIPRAZOLE 0.5 MG/1
0.5 TABLET ORAL DAILY
COMMUNITY
Start: 2022-02-07

## 2022-05-04 NOTE — PROGRESS NOTES
Mic Johnson is a 52 y.o. female  Chief Complaint   Patient presents with    Follow-up     Visit Vitals  /83 (BP 1 Location: Left upper arm, BP Patient Position: Sitting, BP Cuff Size: Adult)   Pulse 86   Temp 97.7 °F (36.5 °C) (Temporal)   Resp 14   Ht 5' 4\" (1.626 m)   Wt 179 lb 3.2 oz (81.3 kg)   SpO2 96%   BMI 30.76 kg/m²      Health Maintenance Due   Topic Date Due    Pneumococcal 0-64 years (1 - PCV) Never done    Eye Exam Retinal or Dilated  Never done    Cervical cancer screen  06/15/2020     HPI  UTI sx off and on x 1 month. Slightly itchy red Rash under abdominal fold since yesterday. DM II - pt reports A1c of 5.5 at Endocrine recently. Will request records. Lab Results   Component Value Date/Time    Hemoglobin A1c 6.8 (H) 11/19/2021 09:50 AM    Hemoglobin A1c 6.9 (H) 01/12/2021 08:51 AM    Hemoglobin A1c 12.1 (H) 10/05/2020 10:35 AM    Glucose 116 (H) 03/14/2022 04:19 PM    Microalbumin/Creat ratio (mg/g creat) 37 (H) 11/19/2021 09:50 AM    Microalbumin,urine random 5.19 11/19/2021 09:50 AM    LDL, calculated 63.4 11/19/2021 09:50 AM    Creatinine 0.69 03/14/2022 04:19 PM     Currently taking:   Key Antihyperglycemic Medications             empagliflozin (Jardiance) 10 mg tablet (Taking) Jardiance 10 mg tablet   Take 1 tablet every day by oral route. metFORMIN (GLUCOPHAGE) 500 mg tablet (Taking) TAKE 1 TABLET BY MOUTH TWICE DAILY WITH MEALS        Obesity -   Wt Readings from Last 3 Encounters:   05/04/22 179 lb 3.2 oz (81.3 kg)   03/14/22 194 lb (88 kg)   11/19/21 211 lb 12.8 oz (96.1 kg)     Has lost 30 lbs since January with Optivio. (Like Medifast). Pleased with progress! ROS  Review of Systems   Constitutional: Positive for weight loss. Negative for fever and malaise/fatigue. Respiratory: Negative for shortness of breath. Cardiovascular: Negative for chest pain and palpitations. Skin: Positive for itching and rash.    Neurological: Negative for dizziness, loss of consciousness and weakness. Endo/Heme/Allergies:        No clots   Psychiatric/Behavioral: Negative for depression. EXAM  Physical Exam  Vitals and nursing note reviewed. Constitutional:       General: She is not in acute distress. Appearance: She is well-developed. HENT:      Head: Normocephalic and atraumatic. Neck:      Vascular: No JVD. Cardiovascular:      Rate and Rhythm: Normal rate and regular rhythm. Heart sounds: Normal heart sounds. Pulmonary:      Effort: Pulmonary effort is normal. No respiratory distress. Breath sounds: Normal breath sounds. Musculoskeletal:      Cervical back: Neck supple. Skin:     General: Skin is warm and dry. Comments: Under pannus, moist skin. Large Rash with central clearing and peripheral red, lacy border. Neurological:      Mental Status: She is alert and oriented to person, place, and time. Psychiatric:         Mood and Affect: Mood normal.         Behavior: Behavior normal.         Thought Content: Thought content normal.         Judgment: Judgment normal.       ASSESSMENT/PLAN  Encounter Diagnoses     ICD-10-CM ICD-9-CM   1. Type 2 diabetes mellitus without complication, without long-term current use of insulin ([facility])  E11.9 250.00   2. Severe obesity ([facility]  E66.01 278.01   3. Factor V deficiency ([facility]  D68.2 286.3   4. Recurrent depression ([facility])  F33.9 296.30   5. UTI symptoms - off and on. Normal POC UA Await culture results. R39.9 788.99   6. Tinea corporis  B35.4 110.5     Orders Placed This Encounter    CULTURE, URINE    AMB POC URINALYSIS DIP STICK AUTO W/O MICRO    fluconazole (DIFLUCAN) 150 mg tablet     Congratulated pt on weight loss success and encouraged continued efforts! Follow up with Endocrine as planned.

## 2022-05-04 NOTE — PROGRESS NOTES
Reviewed record in preparation for visit and have obtained necessary documentation. Identified pt with two pt identifiers(name and ). Chief Complaint   Patient presents with    Follow-up     Vitals:    22 0756   BP: 121/83   Pulse: 86   Resp: 14   Temp: 97.7 °F (36.5 °C)   TempSrc: Temporal   SpO2: 96%   Weight: 179 lb 3.2 oz (81.3 kg)   Height: 5' 4\" (1.626 m)        Health Maintenance Due   Topic Date Due    Pneumococcal Vaccine (1 - PCV) Never done    Eye Exam  Never done    Cervical cancer screen  06/15/2020       Ms. Carlos Paez has a reminder for a \"due or due soon\" health maintenance. I have asked that she discuss this further with her primary care provider for follow-up on this health maintenance. Coordination of Care Questionnaire:  :     1) Have you been to an emergency room, urgent care clinic since your last visit? no   Hospitalized since your last visit? no             2) Have you seen or consulted any other health care providers outside of [address] [place] [address] since your last visit? no  (Include any pap smears or colon screenings in this section.)    3. For patients aged 39-70: Has the patient had a colonoscopy / FIT/ Cologuard? Yes - Care Gap present. Most recent result on file      If the patient is female:  4. For patients aged 41-77: Has the patient had a mammogram within the past 2 years? Yes - Care Gap present. Rooming MA/LPN to request most recent results       5. For patients aged 21-65: Has the patient had a pap smear? No      In the event something were to happen to you and you were unable to speak on your behalf, do you have an Advance Directive/ Living Will in place stating your wishes? NO    Do you have an Advance Directive on file? no    6) Are you interested in receiving information on Advance Directives?  NO    Patient is accompanied by self I have received verbal consent from Jose Luis Acosta to discuss any/all medical information while they are present in the room.

## 2022-05-06 ENCOUNTER — TELEPHONE (OUTPATIENT)
Dept: INTERNAL MEDICINE CLINIC | Age: 47
End: 2022-05-06

## 2022-05-06 LAB
BACTERIA SPEC CULT: ABNORMAL
CC UR VC: ABNORMAL
SERVICE CMNT-IMP: ABNORMAL

## 2022-05-06 RX ORDER — AMOXICILLIN 875 MG/1
875 TABLET, FILM COATED ORAL 2 TIMES DAILY
Qty: 20 TABLET | Refills: 0 | Status: SHIPPED | OUTPATIENT
Start: 2022-05-06 | End: 2022-09-08

## 2022-06-28 DIAGNOSIS — R74.8 ELEVATED LIVER ENZYMES: Primary | ICD-10-CM

## 2022-06-29 ENCOUNTER — DOCUMENTATION ONLY (OUTPATIENT)
Dept: HEMATOLOGY | Age: 47
End: 2022-06-29

## 2022-08-10 LAB
CREATININE, EXTERNAL: 0.66
LDL-C, EXTERNAL: 83

## 2022-08-17 ENCOUNTER — TRANSCRIBE ORDER (OUTPATIENT)
Dept: SCHEDULING | Age: 47
End: 2022-08-17

## 2022-08-17 DIAGNOSIS — R74.8 ACID PHOSPHATASE ELEVATED: Primary | ICD-10-CM

## 2022-09-08 RX ORDER — METFORMIN HYDROCHLORIDE 500 MG/1
2000 TABLET, EXTENDED RELEASE ORAL EVERY EVENING
COMMUNITY

## 2022-09-08 RX ORDER — LORATADINE 10 MG/1
10 TABLET ORAL DAILY
COMMUNITY

## 2022-09-08 RX ORDER — CHOLECALCIFEROL (VITAMIN D3) 125 MCG
10000 CAPSULE ORAL DAILY
COMMUNITY

## 2022-09-15 ENCOUNTER — HOSPITAL ENCOUNTER (OUTPATIENT)
Age: 47
Setting detail: OUTPATIENT SURGERY
Discharge: HOME | End: 2022-09-15
Attending: INTERNAL MEDICINE | Admitting: INTERNAL MEDICINE
Payer: COMMERCIAL

## 2022-09-15 ENCOUNTER — APPOINTMENT (OUTPATIENT)
Dept: ULTRASOUND IMAGING | Age: 47
End: 2022-09-15
Attending: INTERNAL MEDICINE
Payer: COMMERCIAL

## 2022-09-15 VITALS
TEMPERATURE: 97.3 F | SYSTOLIC BLOOD PRESSURE: 125 MMHG | WEIGHT: 185 LBS | BODY MASS INDEX: 31.58 KG/M2 | OXYGEN SATURATION: 99 % | RESPIRATION RATE: 16 BRPM | DIASTOLIC BLOOD PRESSURE: 73 MMHG | HEIGHT: 64 IN | HEART RATE: 76 BPM

## 2022-09-15 DIAGNOSIS — R74.8 ACID PHOSPHATASE ELEVATED: ICD-10-CM

## 2022-09-15 DIAGNOSIS — R74.8 ELEVATED LIVER ENZYMES: Primary | ICD-10-CM

## 2022-09-15 LAB — HCG UR QL: NEGATIVE

## 2022-09-15 PROCEDURE — 88307 TISSUE EXAM BY PATHOLOGIST: CPT

## 2022-09-15 PROCEDURE — 88313 SPECIAL STAINS GROUP 2: CPT

## 2022-09-15 PROCEDURE — 76040000019: Performed by: INTERNAL MEDICINE

## 2022-09-15 PROCEDURE — 76942 ECHO GUIDE FOR BIOPSY: CPT

## 2022-09-15 PROCEDURE — 47000 NEEDLE BIOPSY OF LIVER PERQ: CPT | Performed by: INTERNAL MEDICINE

## 2022-09-15 PROCEDURE — 74011000250 HC RX REV CODE- 250: Performed by: INTERNAL MEDICINE

## 2022-09-15 PROCEDURE — 77030013826 HC NDL BIOP MAXCOR BARD -B: Performed by: INTERNAL MEDICINE

## 2022-09-15 PROCEDURE — 76942 ECHO GUIDE FOR BIOPSY: CPT | Performed by: INTERNAL MEDICINE

## 2022-09-15 PROCEDURE — 77030014115: Performed by: INTERNAL MEDICINE

## 2022-09-15 PROCEDURE — 81025 URINE PREGNANCY TEST: CPT

## 2022-09-15 RX ORDER — SODIUM CHLORIDE 0.9 % (FLUSH) 0.9 %
5-40 SYRINGE (ML) INJECTION EVERY 8 HOURS
Status: DISCONTINUED | OUTPATIENT
Start: 2022-09-15 | End: 2022-09-15 | Stop reason: HOSPADM

## 2022-09-15 RX ORDER — LIDOCAINE HYDROCHLORIDE 10 MG/ML
10 INJECTION INFILTRATION; PERINEURAL ONCE
Status: COMPLETED | OUTPATIENT
Start: 2022-09-15 | End: 2022-09-15

## 2022-09-15 RX ORDER — SODIUM CHLORIDE 0.9 % (FLUSH) 0.9 %
5-40 SYRINGE (ML) INJECTION AS NEEDED
Status: DISCONTINUED | OUTPATIENT
Start: 2022-09-15 | End: 2022-09-15 | Stop reason: HOSPADM

## 2022-09-15 RX ORDER — ONDANSETRON 2 MG/ML
4 INJECTION INTRAMUSCULAR; INTRAVENOUS
Status: DISCONTINUED | OUTPATIENT
Start: 2022-09-15 | End: 2022-09-15 | Stop reason: HOSPADM

## 2022-09-15 RX ORDER — FENTANYL CITRATE 50 UG/ML
25 INJECTION, SOLUTION INTRAMUSCULAR; INTRAVENOUS
Status: DISCONTINUED | OUTPATIENT
Start: 2022-09-15 | End: 2022-09-15 | Stop reason: HOSPADM

## 2022-09-15 NOTE — PROCEDURES
[facility], [place], [address], [place], [place]      FRANCOIS Rajan, [facility]-BC     April S Fan, [facility]   ISAIAS AllenP-VALENTINA Arriaga, [facility]       Vicente [facility] 136    at [address], [address] [place]  22.    [phone]    FAX: [address] [place] [address], 300 [facility] [address]    [phone]    FAX: [phone]         LIVER BIOPSY PROCEDURE NOTE    Rj Rudd  1975    INDICATIONS/PRE-OPERATIVE  DIAGNOSIS:  Elevated liver enzymes    : Chel Schmdit MD    SURGICAL ASSISTANT:  None    PROSTHETIC DEVICES, TISSUE GRAFTS, TRANSPLANTED ORGANS:  Not applicable    SEDATION: 1% Lidocaine injection 10 ml    PROCEDURE:  Informed consent to perform the procedure was obtained from the patient. The patient was positioned on the edge of the stretcher lying flat in the supine position. Ultrasound was utilized to image the liver. The diaphragm and any major mass lesion or vascular structures within the liver were identified. An appropriate site for liver biopsy was identified. The distance from the surface of the skin to the liver capsule was 4 cm. This area was prepped with betadine and draped in sterile fashion. The skin was infiltrated with 1% lidocaine. The deeper subcutanous tissues and liver capsule overlying the biopsy site were then infiltrated with 1% lidocaine until appropriate anesthesia was obtained. A small incision was made in the skin so the biopsy devise could be easily inserted. A total of 2 passes with the 16 gauge Bard biopsy devise was then made into the liver. Core(s) of liver tissue totaling 4 cm in length were obtained and placed into tissue fixative.   A band aid was placed over the biopsy site. The patient was then repositioned on the right side and transported to the recovery area on the stretcher for routine monitoring until discharge. The specimen was sent to pathology for processing via the normal transport mechanism. SPECIMEN COLLECTED: Liver    INTERVENTIONS:  None    ESTIMATED BLOOD LOSS: Negligible.      COMPLICATIONS:  None    POST-OPERATIVE DIAGNOSIS: Same as Pre-operative Diagnosis      Jun Ford MD  [address] [facility] 38.  MOB Mariela Manzo 7  [place]  22.  [phone]  FAX:  [address]

## 2022-09-15 NOTE — ROUTINE PROCESS
Octavia Farr  1975  [id number]    Situation:  Verbal report received from: Doni Day  Procedure: Procedure(s):  LIVER BIOPSY    Background:    Preoperative diagnosis: ELEVATED LIVER ENXYMES  Postoperative diagnosis: Elevated Liver Enzymes    :  Dr. Arabella Vail  Assistant(s): Endoscopy Technician-1: Rubens Cain  Endoscopy RN-1: Agusto Albright RN    Specimens:   ID Type Source Tests Collected by Time Destination   1 : liver bx Preservative Liver specimen  Evan Saavedra MD 9/15/2022 0759 Pathology     H. Pylori  no    Assessment:  Intra-procedure medications   Anesthesia gave intra-procedure sedation and medications, see anesthesia flow sheet yes    Intravenous fluids: NS@ KVO     Vital signs stable     Abdominal assessment: round and soft     Recommendation:  Discharge patient per MD order.   Family or Friend Mom in [address] to share finding with family or friend yes

## 2022-09-15 NOTE — H&P
[address] [facility], [place] GHOT[place], [facility], [place]      FRANCOIS Caraballo, [facility]     Karla Chavira, [facility]   JAMILA Sweeney, [facility]       Vicente Khan [address]    at [address], [facility] [place]  22.    [phone]    FAX: [address] [place] [address], [address]    [phone]    FAX: [phone]         PRE-PROCEDURE NOTE - LIVER BIOPSY    H and P from last office visit reviewed. Allergies reviewed. Out-patient medication list reviewed. 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 Current Outpatient Medications on File Prior to Encounter   Medication Sig Dispense Refill    cholecalciferol (VITAMIN D3) (5000 Units /125 mcg) capsule Take 10,000 Units by mouth daily. loratadine (CLARITIN) 10 mg tablet Take 10 mg by mouth daily. Bacillus coagulans/inulin (PROBIOTIC WITH PREBIOTIC PO) Take  by mouth. With cranberry and vitamin C. Takes two po once daily.       metFORMIN ER (GLUCOPHAGE XR) 500 mg tablet Take 2,000 mg by mouth every evening. Rexulti 0.5 mg tab tablet Take 0.5 mg by mouth daily. esomeprazole (NEXIUM) 20 mg capsule Take 20 mg by mouth daily. aspirin delayed-release 81 mg tablet Take 81 mg by mouth daily. Trintellix 10 mg tablet Take 10 mg by mouth daily. For liver biopsy to assess   Chronic HCV. Chronic HBV. NALFD. Autoimmune hepatitis. Abnormal liver enzymes. Primary Biliary Cholangitis. Primary Sclerosing Cholangitis. Hemochromatosis. Evaluation of elevated liver enzymes in a liver transplant recipient    The risks of the procedure were discussed with the patient. This included bleeding, pain, and puncture of other organs. All questions were answered. The patient wishes to proceed with the procedure. The patient was counseled at length about the risks of brielle Covid-19 in the stacy-operative and post-operative states including the recovery window of their procedure. The patient was made aware that brielle Covid-19 after a surgical procedure may worsen their prognosis for recovering from the virus and lend to a higher morbidity and or mortality risk. The patient was given the options of postponing their procedure. All of the risks, benefits, and alternatives were discussed. The patient does  wish to proceed with the procedure. PHYSICAL EXAMINATION:  Visit Vitals  /65   Pulse 73   Temp 97.3 °F (36.3 °C)   Resp 16   Ht 5' 4\" (1.626 m)   Wt 185 lb (83.9 kg)   LMP 07/14/2022 Comment: last period about one months ago   SpO2 97%   Breastfeeding No   BMI 31.76 kg/m²       General: No acute distress. Eyes: Sclera anicteric. ENT: No oral lesions. Thyroid normal.  Nodes: No adenopathy. Skin: No spider angiomata. No jaundice. No palmar erythema. Respiratory: Lungs clear to auscultation. Cardiovascular: Regular heart rate. No murmurs. No JVD. Abdomen: Soft non-tender, liver size normal to percussion/palpation. Spleen not palpable.  No obvious ascites. Extremities: No edema. No muscle wasting. No gross arthritic changes. Neurologic: Alert and oriented. Cranial nerves grossly intact. No asterixis. LABS:  Lab Results   Component Value Date/Time    WBC 8.7 03/14/2022 04:19 PM    HGB (POC) 13.1 05/23/2013 09:30 AM    HGB 13.7 03/14/2022 04:19 PM    HCT (POC) 40.0 05/23/2013 09:30 AM    HCT 42.3 03/14/2022 04:19 PM    PLATELET 736 67/43/0242 04:19 PM    MCV 83 03/14/2022 04:19 PM     Lab Results   Component Value Date/Time    INR 1.0 03/14/2022 04:19 PM    Prothrombin time 10.3 03/14/2022 04:19 PM       ASSESSMENT AND PLAN:  Liver biopsy under ultrasound guidance.     Arnaldo Suarez MD  [facility]  [address] Mariela Manzo [facility] Frankymememerary  22.  [phone]  FAX:  [phone]

## 2022-09-15 NOTE — DISCHARGE INSTRUCTIONS
[address] hospitals, MD, FACP, [place], [place]      FRANCOIS Medrano, [facility]     Karla Chavira, [facility]   JAMILA Wisemanon Aneta, [facility]       Vicente Deputado Bill De [address] 136    at [address], [address] [place], [place] [address] 22. [phone]    FAX: [address] [place] [address], [address]    [phone]    FAX: [phone]         LIVER BIOPSY DISCHARGE INSTRUCTIONS      Nohemi Jon  1975  Date: 9/15/2022    DIET:    Valerie Smith may resume your previous diet. ACTIVITIES:  Rest quietly the rest of today. You should not lift any objects more than 20 pounds for the next 2 days. If you work sitting down without strenuous activity you may return to work tomorrow. If you exert yourself or do heavy lifting at work you should take tomorrow off. Do not drive or operate hazardous machinery for 12 hours after you are discharged from this procedure. SPECIAL INSTRUCTIONS:  Do not use any aspirin or non-steroidal (Motin, Advil, Naproxen, etc) pain medications for the next 2 days. You may use extra-strength Tylenol (acetaminophen) if you experience pain or discomfort later today. Restarting blood thinners: If you were taking blood thinners prior to the procedure you can restart these in 2 days. Call the [facility] office if you experience any of the following:  Persistent or severe abdominal pain. Persistent or severe abdominal distention. Fever and chills   Nausea and vomiting. New or unusual symptoms. Follow-up care: You should have a follow up appointment with Dr. Alec Solis to review the results of the liver biopsy results in 2 weeks.   If you do not have an appointment please call the office at the number listed above to schedule this. Other instructions: If you have any problems or questions call the [facility] office at the phone number listed above. DISCHARGE SUMMARY from Nurse: The following personal items collected during your admission are returned to you:   Dental Appliance: Dental Appliances: None  Vision: Visual Aid: None  Hearing Aid:    Jewelry:    Clothing:    Other Valuables:    Valuables sent to safe:            Learning About Coronavirus (COVID-19)  Coronavirus (COVID-19): Overview  What is coronavirus ([facility])? The coronavirus disease (COVID-19) is caused by a virus. It is an illness that was first found in [place], [place], in December 2019. It has since spread worldwide. The virus can cause fever, cough, and trouble breathing. In severe cases, it can cause pneumonia and make it hard to breathe without help. It can cause death. Coronaviruses are a large group of viruses. They cause the common cold. They also cause more serious illnesses like Middle East respiratory syndrome (MERS) and severe acute respiratory syndrome (SARS). COVID-19 is caused by a novel coronavirus. That means it's a new type that has not been seen in people before. This virus spreads person-to-person through droplets from coughing and sneezing. It can also spread when you are close to someone who is infected. And it can spread when you touch something that has the virus on it, such as a doorknob or a tabletop. What can you do to protect yourself from coronavirus (COVID-19)? The best way to protect yourself from getting sick is to: Avoid areas where there is an outbreak. Avoid contact with people who may be infected. Wash your hands often with soap or alcohol-based hand sanitizers. Avoid crowds and try to stay at least 6 feet away from other people. Wash your hands often, especially after you cough or sneeze. Use soap and water, and scrub for at least 20 seconds.  If soap and water aren't available, use an alcohol-based hand . Avoid touching your mouth, nose, and eyes. What can you do to avoid spreading the virus to others? To help avoid spreading the virus to others:  Cover your mouth with a tissue when you cough or sneeze. Then throw the tissue in the trash. Use a disinfectant to clean things that you touch often. Stay home if you are sick or have been exposed to the virus. Don't go to school, work, or public areas. And don't use public transportation. If you are sick:  Leave your home only if you need to get medical care. But call the doctor's office first so they know you're coming. And wear a face mask, if you have one. If you have a face mask, wear it whenever you're around other people. It can help stop the spread of the virus when you cough or sneeze. Clean and disinfect your home every day. Use household  and disinfectant wipes or sprays. Take special care to clean things that you grab with your hands. These include doorknobs, remote controls, phones, and handles on your refrigerator and microwave. And don't forget countertops, tabletops, bathrooms, and computer keyboards. When to call for help  Call 911 anytime you think you may need emergency care. For example, call if:  You have severe trouble breathing. (You can't talk at all.)  You have constant chest pain or pressure. You are severely dizzy or lightheaded. You are confused or can't think clearly. Your face and lips have a blue color. You pass out (lose consciousness) or are very hard to wake up. Call your doctor now if you develop symptoms such as:  Shortness of breath. Fever. Cough. If you need to get care, call ahead to the doctor's office for instructions before you go. Make sure you wear a face mask, if you have one, to prevent exposing other people to the virus. Where can you get the latest information? The following health organizations are tracking and studying this virus.  Their websites contain the most up-to-date information. Nadya Disla also learn what to do if you think you may have been exposed to the virus. U.S. Centers for Disease Control and Prevention (CDC): The CDC provides updated news about the disease and travel advice. The website also tells you how to prevent the spread of infection. www.cdc.gov  World Health Organization [facility]): WHO offers information about the virus outbreaks. WHO also has travel advice. www.who.int  Current as of: April 1, 2020               Content Version: 12.4  © 2006-2020 [employer]. Care instructions adapted under license by your healthcare professional. If you have questions about a medical condition or this instruction, always ask your healthcare professional. [address] any warranty or liability for your use of this information.

## 2022-10-04 ENCOUNTER — OFFICE VISIT (OUTPATIENT)
Dept: HEMATOLOGY | Age: 47
End: 2022-10-04
Payer: COMMERCIAL

## 2022-10-04 VITALS
SYSTOLIC BLOOD PRESSURE: 139 MMHG | RESPIRATION RATE: 16 BRPM | TEMPERATURE: 97 F | OXYGEN SATURATION: 98 % | HEART RATE: 82 BPM | BODY MASS INDEX: 31.76 KG/M2 | DIASTOLIC BLOOD PRESSURE: 76 MMHG | HEIGHT: 64 IN | WEIGHT: 186 LBS

## 2022-10-04 DIAGNOSIS — K75.81 NASH (NONALCOHOLIC STEATOHEPATITIS): Primary | ICD-10-CM

## 2022-10-04 PROCEDURE — 99214 OFFICE O/P EST MOD 30 MIN: CPT | Performed by: INTERNAL MEDICINE

## 2022-10-04 NOTE — PROGRESS NOTES
Identified pt with two pt identifiers(name and ). Reviewed record in preparation for visit and have obtained necessary documentation. Chief Complaint   Patient presents with    Elevated Liver Enzymes     LBX  f/u      Vitals:    10/04/22 1518   BP: 139/76   Pulse: 82   Resp: 16   Temp: 97 °F (36.1 °C)   TempSrc: Temporal   SpO2: 98%   Weight: 186 lb (84.4 kg)   Height: 5' 4\" (1.626 m)   PainSc:   0 - No pain   LMP: 2022       Health Maintenance Review: Patient reminded of \"due or due soon\" health maintenance. I have asked the patient to contact his/her primary care provider (PCP) for follow-up on his/her health maintenance. Coordination of Care Questionnaire:  :   1) Have you been to an emergency room, urgent care, or hospitalized since your last visit? If yes, where when, and reason for visit? no       2. Have seen or consulted any other health care provider since your last visit? If yes, where when, and reason for visit? YES, Endocrinology in the summer      Patient is accompanied by self I have received verbal consent from Blaise Davison [facility] to discuss any/all medical information while they are present in the room.

## 2022-10-04 NOTE — PROGRESS NOTES
[facility], MD, [address], [place], [place]      FRANCOIS Fox S Fan, [facility] Center[facility]-BC   Fran Jessica, [facility]-AG   Rodríguez Betts, JAIMLA Cintron Se, FNP-VALENTINA Loya, PCNP-BC       Vicente Light [facility] 136    at [address], [facility], [facility] 22.    [phone]    FAX: [address] [place] [address] Avenue    [address], [address], [address]    [phone]    FAX: [phone]       Patient Care Team:  Cody Figueroa as PCP - General (Physician Assistant)  Kevin Barragan PA-C as PCP - [facility] Empaneled Provider  Hany Garza MD as Physician (Family Medicine)  Call, Krista Benson MD (Allergy)  Sonya Miller MD (Gastroenterology)  Jd Day MD (Endocrinology Physician)      Problem List  Date Reviewed: 5/4/2022            Codes Class Noted    Mild stress incontinence ICD-10-CM: N39.3  ICD-9-CM: Jillian Jovan  11/19/2021        Lipoma of anterior chest wall ICD-10-CM: D17.1  ICD-9-CM: 214.8  7/26/2021        Grief ICD-10-CM: F43.21  ICD-9-CM: 309.0  5/18/2021    Overview Signed 5/18/2021  1:47 PM by Kevin Barragan PA-C     5/2021: Father passed away 2/2021. Has good support system. Planning [facility] service in June, 2021.              Type 2 diabetes mellitus without complication, without long-term current use of insulin (HCC) ICD-10-CM: E11.9  ICD-9-CM: 250.00  12/15/2020        Essential hypertension ICD-10-CM: I10  ICD-9-CM: 401.9  5/6/2019        Recurrent depression ([facility] [address] ICD-10-CM: F33.9  ICD-9-CM: 296.30  10/23/2018        Severe obesity ([facility] [address] ICD-10-CM: E66.01  ICD-9-CM: 278.01  10/23/2018        Insomnia ICD-10-CM: G47.00  ICD-9-CM: 780.52  5/27/2016        Chronic UTI ICD-10-CM: N39.0  ICD-9-CM: 599.0  1/18/2016        HPV in female ICD-10-CM: B97.7  ICD-9-CM: 079.4  6/15/2015        Multinodular goiter ICD-10-CM: E04.2  ICD-9-CM: 241.1  5/21/2015        Elevated liver enzymes ICD-10-CM: R74.8  ICD-9-CM: 790.5  5/21/2015        Hypercholesteremia ICD-10-CM: E78.00  ICD-9-CM: 272.0  5/21/2015        Allergic rhinitis due to allergen ICD-10-CM: J30.9  ICD-9-CM: 477.9  5/15/2015        Anxiety ICD-10-CM: F41.9  ICD-9-CM: 300.00  5/15/2015        Factor V deficiency ([facility] [address] ICD-10-CM: U93.1  ICD-9-CM: 286.3  5/15/2015        Urticaria ICD-10-CM: L50.9  ICD-9-CM: 708.9  5/15/2015           Octavia LEY Yordan [facility] for management of non-alcoholic fatty liver disease (NAFLD). The active problem list, all pertinent past medical history, medications, liver histology,   radiologic findings and laboratory findings related to the liver disorder were reviewed and discussed with the patient. The patient is a 52 y.o.  female who was found to have elevated liver enzymes in 2018 during routine labs. The patient underwent a liver biopsy in 9/2022. The procedure was well tolerated. I have personally reviewed and interpreted the liver biopsy slides. This demonstrates NALFD with with stage 1 fibrosis. .    The patient does not have any symptoms which could be attributed to the liver disorder. The patient is not experiencing the following symptoms which are commonly seen in this liver disorder:   fatigue,   pain in the right side over the liver,     The patient completes all daily activities without any functional limitations. ASSESSMENT AND PLAN:  NAFLD  The diagnosis is based upon liver biopsy, imaging, features of metabolic syndrome, serologic studies that are negative for other causes of chronic liver disease,     A liver biopsy performed in 9/2022 demonstrates mild steatosis, mild inflammation, mild ballooning and stage 1 fibrosis. AST is normal.  ALT is elevated.   ALP is normal.  Liver function is normal.  The platelet count is normal.      Overall the biopsy can be classified as mild LAZO. There is balloning of hepatocytes but this is mild. There is inflammation but this is also mild. If the patient looses 20% of current body weight, which is 36 pounds, down to a weight of of 150 pounds, all steatosis will have resolved. Once all steatosis has resolved all inflammation will resolve. Then all fibrosis will gradually resolve and the liver could eventually be normal.    There is currently no FDA approved medical treatment for fatty liver, NALFD or LAZO. The only medical treatments for LAZO are though clinical trials. The patient would like to participate in a clinical trial.    Counseling for diet and weight loss in patients with confirmed or suspected NAFLD  The patient was counseled regarding diet and exercise to achieve weight loss. The best diet for patients with fatty liver is one very low in carbohydrates and enriched with protein such as an Alpa's program.      The patient was told not to consume any food products and drinks containing fructose as this enhances hepatic fat synthesis. There is no medication or vitamin supplements that we advocate for LAZO. Using glitazones in patients without diabetes mellitus has been shown to reduce fat content in the liver but has no effect on fibrosis and is associated with weight gain. Vitamin E has also been used but the data is not very good and most experts no longer advocate this. Screening for Hepatocellular Carcinoma  HCC screening is not necessary if the patient has no evidence of cirrhosis. Treatment of other medical problems in patients with chronic liver disease  There are no contraindications for the patient to take most medications that are necessary for treatment of other medical issues. The patient can take any medications utilized for treatment of DM, statins to treat hypercholesterolemia.     Counseling for alcohol in patients with chronic liver disease  The patient was counseled regarding alcohol consumption and the effect of alcohol on chronic liver disease. The patient does not consume any significant amount of alcohol. Vaccinations   The need for vaccination against viral hepatitis A and B will be assessed with serologic and instituted as appropriate. The patient has received 2 doses of COVID-19 vaccine. Routine vaccinations against other bacterial and viral agents can be performed as indicated. Annual flu vaccination should be administered if indicated. ALLERGIES  No Known Allergies    MEDICATIONS  Current Outpatient Medications   Medication Sig    cholecalciferol (VITAMIN D3) (5000 Units /125 mcg) capsule Take 10,000 Units by mouth daily. loratadine (CLARITIN) 10 mg tablet Take 10 mg by mouth daily. Bacillus coagulans/inulin (PROBIOTIC WITH PREBIOTIC PO) Take  by mouth. With cranberry and vitamin C. Takes two po once daily. metFORMIN ER (GLUCOPHAGE XR) 500 mg tablet Take 2,000 mg by mouth every evening. Rexulti 0.5 mg tab tablet Take 0.5 mg by mouth daily. esomeprazole (NEXIUM) 20 mg capsule Take 20 mg by mouth daily. aspirin delayed-release 81 mg tablet Take 81 mg by mouth daily. Trintellix 10 mg tablet Take 10 mg by mouth daily. No current facility-administered medications for this visit. SYSTEM REVIEW NOT RELATED TO LIVER DISEASE OR REVIEWED ABOVE:  Constitution systems: Negative for fever, chills, weight gain. Recent weight loss of ~17# since 11/2021. Eyes: Negative for visual changes. ENT: Negative for sore throat, painful swallowing. Respiratory: Negative for cough, hemoptysis, SOB. Cardiology: Negative for chest pain, palpitations. GI:  Negative for constipation or diarrhea. : Negative for urinary frequency, dysuria, hematuria, nocturia. Skin: Negative for rash. Hematology: Negative for easy bruising, blood clots.     Musculo-skeletal: Negative for back pain, muscle pain, weakness. Neurologic: Negative for headaches, dizziness, vertigo, memory problems not related to HE. Psychology: Negative for anxiety, depression. FAMILY HISTORY:  The father  of MI. The mother Has/had the following chronic disease(s): none, 76 and well. The following family members have liver disease: PGM that may have had NAFL. SOCIAL HISTORY:  The patient is single in long-standing relationship. The patient has no children. The patient stopped using tobacco products in . The patient consumes alcohol on social occasions never in excess. 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 PHYSICAL EXAMINATION:  Visit Vitals  /76 (BP 1 Location: Left upper arm, BP Patient Position: Sitting, BP Cuff Size: Large adult)   Pulse 82   Temp 97 °F (36.1 °C) (Temporal)   Resp 16   Ht 5' 4\" (1.626 m)   Wt 186 lb (84.4 kg)   LMP 2022   SpO2 98%   BMI 31.93 kg/m²     General: No acute distress. Eyes: Sclera anicteric. ENT: No oral lesions. Thyroid normal.  Nodes: No adenopathy. Skin: No spider angiomata. No jaundice. No palmar erythema. Respiratory: Lungs clear to auscultation. Cardiovascular: Regular heart rate. No murmurs. No JVD. Abdomen: Soft non-tender. Liver size normal to percussion/palpation. Spleen not palpable. No obvious ascites. Extremities: No edema. No muscle wasting. No gross arthritic changes. Neurologic: Alert and oriented. Cranial nerves grossly intact. No asterixis.     LABORATORY STUDIES:  Liver [place] of [address] [place] [address] Units 2021   WBC 3.6 - 11.0 K/uL  7.9   ANC 1.4 - 7.0 x10E3/uL     HGB 11.5 - 16.0 g/dL  13.8    - 400 K/uL  352   AST 0 - 40 IU/L 86 (H) 131 (H)   ALT 0 - 32 IU/L 183 (H) 248 (H)   Alk Phos 44 - 121 IU/L 112 106   Bili, Total 0.0 - 1.2 mg/dL 0.2 0.4   Bili, Direct 0.00 - 0.40 mg/dL <0.10    Albumin 3.8 - 4.8 g/dL 4.3 3. 8   BUN 6 - 20 MG/DL  17   Creat 0.55 - 1.02 MG/DL  0.80   Na 136 - 145 mmol/L  138   K 3.5 - 5.1 mmol/L  4.2   Cl 97 - 108 mmol/L  107   CO2 21 - 32 mmol/L  22   Glucose 65 - 100 mg/dL  146 (H)     SEROLOGIES:  Serologies Latest Ref Rng & Units 10/5/2020 5/6/2019 4/1/2019   Hep B Surface Ag Negative  Negative    Hep C Ab 0.0 - 0.9 s/co ratio  <0.1    Iron % Saturation 15 - 55 % 27     DARLENE Ab, Direct Negative   Negative     LIVER HISTOLOGY:  6/2022. FibroScan performed at [facility]. EkPa was 26.1. . The results suggested a fibrosis level of F4. The CAP score suggests there is no significant hepatic steatosis. 9/2022. Slides reviewed by MLS. LAZO. 25-33% macrovesicular and micovesicular steatosis, mild inflammation, mild ballooning, Stage 1 fibrosis. FAISAL (111). ENDOSCOPIC PROCEDURES:  Not available or performed    RADIOLOGY:  1/2018. Ultrasound of liver. Echogenic consistent with chronic liver disease suggestive of liver steatosis. No liver mass lesions. No dilated bile ducts. No ascites. OTHER TESTING:  Not available or performed    FOLLOW-UP:  All of the issues listed above in the Assessment and Plan were discussed with the patient. All questions were answered. The patient expressed a clear understanding of the above. [facility] 87 in 3 months for Fibroscan and to assess for the effects of diet changes and weight loss. I will notify her of additional lab values as available to me. FOLLOW-UP:  All of the issues listed above in the Assessment and Plan were discussed with the patient. All questions were answered. The patient expressed a clear understanding of the above. Follow-up [address] in for screening and enrollment into a clinical trial for treatment of LAZO.   The patient was given a follow-up appointment for 6 months in case she decides not to enter or is excluded from entering the clinical trial.      Alvino Stewart MD  [facility]  [facility]  Mariela [address]  [facility], [facility] 22.  [phone]  FAX:  [address]

## 2022-10-04 NOTE — Clinical Note
10/24/2022    Patient: Caitlin Park   YOB: 1975   Date of Visit: 10/4/2022     Miryam Pantoja PA-C  [address] [address]  [address] [facility] [address]    Dear Miryam Pantoja PA-C,      Thank you for referring Ms. Octavia Farr to [address] for evaluation. My notes for this consultation are attached. If you have questions, please do not hesitate to call me. I look forward to following your patient along with you.       Sincerely,    Thong Zavala MD

## 2022-10-24 PROBLEM — F43.21 GRIEF: Status: RESOLVED | Noted: 2021-05-18 | Resolved: 2022-10-24

## 2022-10-24 PROBLEM — K76.0 NAFLD (NONALCOHOLIC FATTY LIVER DISEASE): Status: ACTIVE | Noted: 2022-10-24

## 2022-11-07 ENCOUNTER — OFFICE VISIT (OUTPATIENT)
Dept: INTERNAL MEDICINE CLINIC | Age: 47
End: 2022-11-07
Payer: COMMERCIAL

## 2022-11-07 VITALS
WEIGHT: 189.4 LBS | OXYGEN SATURATION: 98 % | DIASTOLIC BLOOD PRESSURE: 80 MMHG | TEMPERATURE: 98.2 F | SYSTOLIC BLOOD PRESSURE: 124 MMHG | HEIGHT: 64 IN | BODY MASS INDEX: 32.33 KG/M2 | RESPIRATION RATE: 18 BRPM | HEART RATE: 79 BPM

## 2022-11-07 DIAGNOSIS — N39.0 CHRONIC UTI: ICD-10-CM

## 2022-11-07 DIAGNOSIS — E11.9 TYPE 2 DIABETES MELLITUS WITHOUT COMPLICATION, WITHOUT LONG-TERM CURRENT USE OF INSULIN (HCC): Primary | ICD-10-CM

## 2022-11-07 DIAGNOSIS — J30.1 ALLERGIC RHINITIS DUE TO POLLEN, UNSPECIFIED SEASONALITY: ICD-10-CM

## 2022-11-07 DIAGNOSIS — Z82.49 FAMILY HISTORY OF EARLY CAD: ICD-10-CM

## 2022-11-07 DIAGNOSIS — E66.01 SEVERE OBESITY (HCC): ICD-10-CM

## 2022-11-07 DIAGNOSIS — I10 ESSENTIAL HYPERTENSION: ICD-10-CM

## 2022-11-07 DIAGNOSIS — Z23 NEEDS FLU SHOT: ICD-10-CM

## 2022-11-07 DIAGNOSIS — E78.00 HYPERCHOLESTEREMIA: ICD-10-CM

## 2022-11-07 DIAGNOSIS — R07.9 CHEST PAIN, UNSPECIFIED TYPE: ICD-10-CM

## 2022-11-07 PROCEDURE — 90686 IIV4 VACC NO PRSV 0.5 ML IM: CPT | Performed by: PHYSICIAN ASSISTANT

## 2022-11-07 PROCEDURE — 90471 IMMUNIZATION ADMIN: CPT | Performed by: PHYSICIAN ASSISTANT

## 2022-11-07 PROCEDURE — 99214 OFFICE O/P EST MOD 30 MIN: CPT | Performed by: PHYSICIAN ASSISTANT

## 2022-11-07 RX ORDER — CEFUROXIME AXETIL 250 MG/1
TABLET ORAL
COMMUNITY
Start: 2022-10-31

## 2022-11-07 RX ORDER — FLUTICASONE PROPIONATE 50 MCG
2 SPRAY, SUSPENSION (ML) NASAL DAILY
COMMUNITY

## 2022-11-07 RX ORDER — NITROFURANTOIN 25; 75 MG/1; MG/1
CAPSULE ORAL
Qty: 30 CAPSULE | Refills: 2 | Status: SHIPPED | OUTPATIENT
Start: 2022-11-07

## 2022-11-07 NOTE — PATIENT INSTRUCTIONS
Vaccine Information Statement    Influenza (Flu) Vaccine (Inactivated or Recombinant): What You Need to Know    Many vaccine information statements are available in [place] and other languages. See www.immunize.org/vis. Hojas de información sobre vacunas están disponibles en español y en muchos otros idiomas. Visite www.immunize.org/vis. 1. Why get vaccinated? Influenza vaccine can prevent influenza (flu). Flu is a contagious disease that spreads around the [facility] every year, usually between October and May. Anyone can get the flu, but it is more dangerous for some people. Infants and young children, people 72 years and older, pregnant people, and people with certain health conditions or a weakened immune system are at greatest risk of flu complications. Pneumonia, bronchitis, sinus infections, and ear infections are examples of flu-related complications. If you have a medical condition, such as heart disease, cancer, or diabetes, flu can make it worse. Flu can cause fever and chills, sore throat, muscle aches, fatigue, cough, headache, and runny or stuffy nose. Some people may have vomiting and diarrhea, though this is more common in children than adults. In an average year, thousands of people in the [facility] die from flu, and many more are hospitalized. Flu vaccine prevents millions of illnesses and flu-related visits to the doctor each year. 2. Influenza vaccines     CDC recommends everyone 6 months and older get vaccinated every flu season. Children 6 months through 6years of age may need 2 doses during a single flu season. Everyone else needs only 1 dose each flu season. It takes about 2 weeks for protection to develop after vaccination. There are many flu viruses, and they are always changing. Each year a new flu vaccine is made to protect against the influenza viruses believed to be likely to cause disease in the upcoming flu season.  Even when the vaccine doesnt exactly match these viruses, it may still provide some protection. Influenza vaccine does not cause flu. Influenza vaccine may be given at the same time as other vaccines. 3. Talk with your health care provider    Tell your vaccination provider if the person getting the vaccine:  Has had an allergic reaction after a previous dose of influenza vaccine, or has any severe, life-threatening allergies   Has ever had Guillain-Barré Syndrome (also called GBS)    In some cases, your health care provider may decide to postpone influenza vaccination until a future visit. Influenza vaccine can be administered at any time during pregnancy. People who are or will be pregnant during influenza season should receive inactivated influenza vaccine. People with minor illnesses, such as a cold, may be vaccinated. People who are moderately or severely ill should usually wait until they recover before getting influenza vaccine. Your health care provider can give you more information. 4. Risks of a vaccine reaction    Soreness, redness, and swelling where the shot is given, fever, muscle aches, and headache can happen after influenza vaccination. There may be a very small increased risk of Guillain-Barré Syndrome (GBS) after inactivated influenza vaccine (the flu shot). [place] children who get the flu shot along with pneumococcal vaccine (PCV13) and/or DTaP vaccine at the same time might be slightly more likely to have a seizure caused by fever. Tell your health care provider if a child who is getting flu vaccine has ever had a seizure. People sometimes faint after medical procedures, including vaccination. Tell your provider if you feel dizzy or have vision changes or ringing in the ears. As with any medicine, there is a very remote chance of a vaccine causing a severe allergic reaction, other serious injury, or death. 5. What if there is a serious problem?     An allergic reaction could occur after the vaccinated person leaves the clinic. If you see signs of a severe allergic reaction (hives, swelling of the face and throat, difficulty breathing, a fast heartbeat, dizziness, or weakness), call 9-1-1 and get the person to the nearest hospital.    For other signs that concern you, call your health care provider. Adverse reactions should be reported to the Vaccine Adverse Event Reporting System (VAERS). Your health care provider will usually file this report, or you can do it yourself. Visit the VAERS website at www.vaers. [facility].gov or call 6-245.189.3757. VAERS is only for reporting reactions, and VAERS staff members do not give medical advice. 6. The National Vaccine Injury Compensation Program    The [facility] Vaccine Injury Compensation Program (VICP) is a federal program that was created to compensate people who may have been injured by certain vaccines. Claims regarding alleged injury or death due to vaccination have a time limit for filing, which may be as short as two years. Visit the VICP website at www.[facility].gov/vaccinecompensation or call 7-100.417.2784 to learn about the program and about filing a claim. 7. How can I learn more? Ask your health care provider. Call your local or state health department. Visit the website of the Food and Drug Administration (FDA) for vaccine package inserts and additional information at www.fda.gov/vaccines-blood-biologics/vaccines. Contact the Centers for Disease Control and Prevention (CDC): Call 9-791.631.9294 (1-800-CDC-INFO) or  Visit CDCs influenza website at www.cdc.gov/flu. Vaccine Information Statement   Inactivated Influenza Vaccine   8/6/2021  [address] 312MX-04   Department of Health and Human Services  Centers for Disease Control and Prevention    Office Use Only

## 2022-11-07 NOTE — PROGRESS NOTES
Reviewed record in preparation for visit and have obtained necessary documentation. Identified pt with two pt identifiers(name and ). Chief Complaint   Patient presents with    Annual Exam     Blood pressure (!) 148/78, pulse 79, temperature 98.2 °F (36.8 °C), temperature source Temporal, resp. rate 18, height 5' 4\" (1.626 m), weight 189 lb 6.4 oz (85.9 kg), SpO2 98 %. Health Maintenance Due   Topic Date Due    Pneumococcal Vaccine (1 - PCV) Never done    Eye Exam  Never done    Hepatitis B Vaccine (1 of 3 - Risk 3-dose series) Never done    Cervical cancer screen  06/15/2020    COVID-19 Vaccine (3 - Booster for Moderna series) 2021    Yearly Flu Vaccine (1) 2022    Diabetic Foot Care  2022    Albumin Urine Test  2022       Ms. Hector Trimble has a reminder for a \"due or due soon\" health maintenance. I have asked that she discuss this further with her primary care provider for follow-up on this health maintenance. Coordination of Care Questionnaire:  :     1) Have you been to an emergency room, urgent care clinic since your last visit? yes , [facility]  Hospitalized since your last visit? no             2) Have you seen or consulted any other health care providers outside of [address] [place] [address] since your last visit? yes  [facility], Endocrinologist, Obgyn      3) In the event something were to happen to you and you were unable to speak on your behalf, do you have an Advance Directive/ Living Will in place stating your wishes?  NO

## 2022-11-09 LAB
ALBUMIN SERPL-MCNC: 4.4 G/DL (ref 3.8–4.8)
ALBUMIN/CREAT UR: 6 MG/G CREAT (ref 0–29)
ALBUMIN/GLOB SERPL: 1.8 {RATIO} (ref 1.2–2.2)
ALP SERPL-CCNC: 68 IU/L (ref 44–121)
ALT SERPL-CCNC: 29 IU/L (ref 0–32)
AST SERPL-CCNC: 21 IU/L (ref 0–40)
BILIRUB SERPL-MCNC: <0.2 MG/DL (ref 0–1.2)
BUN SERPL-MCNC: 12 MG/DL (ref 6–24)
BUN/CREAT SERPL: 19 (ref 9–23)
CALCIUM SERPL-MCNC: 9.6 MG/DL (ref 8.7–10.2)
CHLORIDE SERPL-SCNC: 101 MMOL/L (ref 96–106)
CHOLEST SERPL-MCNC: 200 MG/DL (ref 100–199)
CO2 SERPL-SCNC: 20 MMOL/L (ref 20–29)
CREAT SERPL-MCNC: 0.64 MG/DL (ref 0.57–1)
CREAT UR-MCNC: 119 MG/DL
EGFR: 110 ML/MIN/1.73
EST. AVERAGE GLUCOSE BLD GHB EST-MCNC: 105 MG/DL
GLOBULIN SER CALC-MCNC: 2.4 G/DL (ref 1.5–4.5)
GLUCOSE SERPL-MCNC: 93 MG/DL (ref 70–99)
HBA1C MFR BLD: 5.3 % (ref 4.8–5.6)
HDLC SERPL-MCNC: 63 MG/DL
IMP & REVIEW OF LAB RESULTS: NORMAL
LDLC SERPL CALC-MCNC: 96 MG/DL (ref 0–99)
MICROALBUMIN UR-MCNC: 6.8 UG/ML
POTASSIUM SERPL-SCNC: 4.2 MMOL/L (ref 3.5–5.2)
PROT SERPL-MCNC: 6.8 G/DL (ref 6–8.5)
SODIUM SERPL-SCNC: 136 MMOL/L (ref 134–144)
TRIGL SERPL-MCNC: 245 MG/DL (ref 0–149)
VLDLC SERPL CALC-MCNC: 41 MG/DL (ref 5–40)

## 2022-11-09 NOTE — PROGRESS NOTES
Diabetes is BEAUTIFULLY controlled! Liver enzymes, kidney function, and electrolytes are all normal/acceptable. Diabetes is well controlled. Good! Triglyceride Cholesterol reading is high. If possible, decrease fried/fatty foods, butter, oils, alcohol (if you drink), and sugary foods/carbohydrates (bread, potatoes, rice, pasta/noodles, cereal), Increase cardio exercise.

## 2022-11-28 ENCOUNTER — OFFICE VISIT (OUTPATIENT)
Dept: CARDIOLOGY CLINIC | Age: 47
End: 2022-11-28
Payer: COMMERCIAL

## 2022-11-28 VITALS
RESPIRATION RATE: 16 BRPM | DIASTOLIC BLOOD PRESSURE: 88 MMHG | OXYGEN SATURATION: 98 % | WEIGHT: 194 LBS | HEIGHT: 64 IN | SYSTOLIC BLOOD PRESSURE: 122 MMHG | BODY MASS INDEX: 33.12 KG/M2 | HEART RATE: 85 BPM

## 2022-11-28 DIAGNOSIS — R94.31 ABNORMAL EKG: ICD-10-CM

## 2022-11-28 DIAGNOSIS — E78.2 MIXED HYPERLIPIDEMIA: ICD-10-CM

## 2022-11-28 DIAGNOSIS — R07.9 CHEST PAIN, UNSPECIFIED TYPE: Primary | ICD-10-CM

## 2022-11-28 PROCEDURE — 3074F SYST BP LT 130 MM HG: CPT | Performed by: SPECIALIST

## 2022-11-28 PROCEDURE — 93000 ELECTROCARDIOGRAM COMPLETE: CPT | Performed by: SPECIALIST

## 2022-11-28 PROCEDURE — 99204 OFFICE O/P NEW MOD 45 MIN: CPT | Performed by: SPECIALIST

## 2022-11-28 PROCEDURE — 3078F DIAST BP <80 MM HG: CPT | Performed by: SPECIALIST

## 2022-11-28 NOTE — PROGRESS NOTES
HISTORY OF PRESENT ILLNESS  Octavia Farr is a 52 y.o. female. She has a family history of coronary disease with her father having his first heart attack possibly at age 39. She has been treated for diabetes for 18 months. She used to smoke cigarettes but has not done so for 7 years. She has occasional chest tightness and also some sharper pain in her chest that is brief in nature. She did lose 40 pounds with diet. Recent blood work showed a total cholesterol of 200 with triglycerides of 245 and LDL of 96. An EKG in the office today shows mild precordial T wave inversions in the first 2 precordial leads of uncertain significance. 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    Urticaria L50.9    Multinodular goiter E04.2    Hypercholesteremia E78.00    HPV in female B97.7    Chronic UTI N39.0    Insomnia G47.00    Recurrent depression ([facility]) F33.9    Severe obesity ([facility]) E66.01    Essential hypertension I10    Type 2 diabetes mellitus without complication, without long-term current use of insulin (HCC) E11.9    Lipoma of anterior chest wall D17.1    Mild stress incontinence N39.3    NAFLD (nonalcoholic fatty liver disease) K76.0     Current Outpatient Medications   Medication Sig Dispense Refill    cefUROXime (CEFTIN) 250 mg tablet       nitrofurantoin, macrocrystal-monohydrate, (MACROBID) 100 mg capsule TAKE 1 CAPSULE BY MOUTH ONCE AS NEEDED FOR  USE  AFTER  INTERCOURSE 30 Capsule 2    fluticasone propionate (FLONASE) 50 mcg/actuation nasal spray 2 Sprays by Both Nostrils route daily. Indications: inflammation of the nose due to an allergy      cholecalciferol (VITAMIN D3) (5000 Units /125 mcg) capsule Take 10,000 Units by mouth daily. loratadine (CLARITIN) 10 mg tablet Take 10 mg by mouth daily. Bacillus coagulans/inulin (PROBIOTIC WITH PREBIOTIC PO) Take  by mouth.  With cranberry and vitamin C. Takes two po once daily. metFORMIN ER (GLUCOPHAGE XR) 500 mg tablet Take 2,000 mg by mouth every evening. Rexulti 0.5 mg tab tablet Take 0.5 mg by mouth daily. esomeprazole (NEXIUM) 20 mg capsule Take 20 mg by mouth daily. aspirin delayed-release 81 mg tablet Take 81 mg by mouth daily. Trintellix 10 mg tablet Take 10 mg by mouth daily. Past Medical History:   Diagnosis Date    Anxiety     Depression     Diabetes ([address]     GERD (gastroesophageal reflux disease) 7/2021    Lipoma of anterior chest wall 7/26/2021    Nontoxic multinodular goiter     Followed with [facility] q6 months starting 7/2015 0 Dr. Aylin Oropeza       Past Surgical History:   Procedure Laterality Date    BIOPSY LIVER      HX COLONOSCOPY      HX OTHER SURGICAL  07/15/2015    Biopsy thyroid       Review of Systems   Constitutional:  Positive for weight loss. Cardiovascular:  Positive for chest pain. All other systems reviewed and are negative. Visit Vitals  /88   Pulse 85   Resp 16   Ht 5' 4\" (1.626 m)   Wt 194 lb (88 kg)   SpO2 98%   BMI 33.30 kg/m²       Physical Exam  Vitals and nursing note reviewed. Constitutional:       Appearance: Normal appearance. HENT:      Head: Normocephalic. Right Ear: External ear normal.      Left Ear: External ear normal.      Nose: Nose normal.      Mouth/Throat:      Mouth: Mucous membranes are moist.   Eyes:      Extraocular Movements: Extraocular movements intact. Cardiovascular:      Rate and Rhythm: Normal rate and regular rhythm. Heart sounds: Normal heart sounds. Pulmonary:      Effort: Pulmonary effort is normal.   Abdominal:      Palpations: Abdomen is soft. Musculoskeletal:         General: Normal range of motion. Cervical back: Normal range of motion. Skin:     General: Skin is warm. Neurological:      Mental Status: She is alert and oriented to person, place, and time.    Psychiatric:         Behavior: Behavior normal.       ASSESSMENT and PLAN  It is unclear whether her chest pain represents coronary disease. I will have her return for a stress echocardiogram and call her regarding the results. If it is negative I will have her undergo a coronary calcium scan to see if she has evidence for early disease that would warrant statin therapy. I talked about just starting her on a statin today because of the diabetes but she would rather not take another pill if possible. If her stress test is abnormal of course and she will need cardiac catheterization.

## 2022-11-29 ENCOUNTER — DOCUMENTATION ONLY (OUTPATIENT)
Dept: HEMATOLOGY | Age: 47
End: 2022-11-29

## 2023-01-27 ENCOUNTER — ANCILLARY PROCEDURE (OUTPATIENT)
Dept: CARDIOLOGY CLINIC | Age: 48
End: 2023-01-27
Payer: COMMERCIAL

## 2023-01-27 VITALS
BODY MASS INDEX: 33.12 KG/M2 | SYSTOLIC BLOOD PRESSURE: 124 MMHG | HEIGHT: 64 IN | WEIGHT: 194 LBS | DIASTOLIC BLOOD PRESSURE: 60 MMHG

## 2023-02-01 ENCOUNTER — TELEPHONE (OUTPATIENT)
Dept: CARDIOLOGY CLINIC | Age: 48
End: 2023-02-01

## 2023-02-01 DIAGNOSIS — R07.9 CHEST PAIN, UNSPECIFIED TYPE: Primary | ICD-10-CM

## 2023-02-01 DIAGNOSIS — R94.31 ABNORMAL EKG: ICD-10-CM

## 2023-02-01 DIAGNOSIS — E78.2 MIXED HYPERLIPIDEMIA: ICD-10-CM

## 2023-02-01 NOTE — TELEPHONE ENCOUNTER
----- Message from Devora Costa MD sent at 1/31/2023  4:10 PM EST -----  Stress echo is normal.  Suggest calcium score as we discussed.

## 2023-11-24 NOTE — PROGRESS NOTES
Chart Reviewed for potential eligibility for [facility] clinical Trial. Screening Eligibility questionable due to Cardiac & Thyroid Medical History.
<-- Click to add NO pertinent Family History

## (undated) DEVICE — TRAY BX SFT TISS W/ RUL ALC PREP PD FEN DRP TWL LUERLOCK

## (undated) DEVICE — MAX-CORE® DISPOSABLE CORE BIOPSY INSTRUMENT, 16G X 16CM: Brand: MAX-CORE